# Patient Record
Sex: FEMALE | Race: WHITE | NOT HISPANIC OR LATINO | Employment: FULL TIME | URBAN - METROPOLITAN AREA
[De-identification: names, ages, dates, MRNs, and addresses within clinical notes are randomized per-mention and may not be internally consistent; named-entity substitution may affect disease eponyms.]

---

## 2017-01-10 ENCOUNTER — TELEPHONE (OUTPATIENT)
Dept: RHEUMATOLOGY | Facility: CLINIC | Age: 47
End: 2017-01-10

## 2017-01-10 NOTE — TELEPHONE ENCOUNTER
Spoke to pt, she is not needing refill. Pt advises pharmacy is what on forms to approve Nuvigil. Advised pt, nurse would look into forms and work on approval. Pt verbalized understanding.

## 2017-01-10 NOTE — TELEPHONE ENCOUNTER
----- Message from Roger Figueroa sent at 1/9/2017  9:41 AM CST -----  Contact: pt  Pt is requesting a callback(pt checking the status of her prescription refill)  Call Back#[phone]  Thanks

## 2017-03-02 RX ORDER — CYANOCOBALAMIN 1000 UG/ML
1000 INJECTION, SOLUTION INTRAMUSCULAR; SUBCUTANEOUS WEEKLY
Qty: 10 ML | Refills: 2 | Status: SHIPPED | OUTPATIENT
Start: 2017-03-02 | End: 2017-06-26 | Stop reason: SDUPTHER

## 2017-03-09 ENCOUNTER — TELEPHONE (OUTPATIENT)
Dept: RHEUMATOLOGY | Facility: CLINIC | Age: 47
End: 2017-03-09

## 2017-03-09 NOTE — TELEPHONE ENCOUNTER
----- Message from Blanka Pedro sent at 3/9/2017 10:31 AM CST -----  Call [phone] pt [facility] around 02/20 th / asking to verify her results were received and scanned into her chart ... Has an appointment with Dr MELANIE Sigala on 03/15/17

## 2017-03-15 ENCOUNTER — OFFICE VISIT (OUTPATIENT)
Dept: ENDOCRINOLOGY | Facility: CLINIC | Age: 47
End: 2017-03-15
Payer: COMMERCIAL

## 2017-03-15 VITALS
BODY MASS INDEX: 36.05 KG/M2 | SYSTOLIC BLOOD PRESSURE: 124 MMHG | WEIGHT: 211.19 LBS | HEIGHT: 64 IN | HEART RATE: 76 BPM | DIASTOLIC BLOOD PRESSURE: 80 MMHG

## 2017-03-15 DIAGNOSIS — R94.6 BORDERLINE ABNORMAL TFTS: Primary | ICD-10-CM

## 2017-03-15 DIAGNOSIS — R53.83 FATIGUE, UNSPECIFIED TYPE: ICD-10-CM

## 2017-03-15 DIAGNOSIS — E66.9 OBESITY (BMI 30-39.9): ICD-10-CM

## 2017-03-15 DIAGNOSIS — R63.8 UNABLE TO LOSE WEIGHT: ICD-10-CM

## 2017-03-15 PROCEDURE — 3074F SYST BP LT 130 MM HG: CPT | Mod: S$GLB,,, | Performed by: INTERNAL MEDICINE

## 2017-03-15 PROCEDURE — 99204 OFFICE O/P NEW MOD 45 MIN: CPT | Mod: S$GLB,,, | Performed by: INTERNAL MEDICINE

## 2017-03-15 PROCEDURE — 1160F RVW MEDS BY RX/DR IN RCRD: CPT | Mod: S$GLB,,, | Performed by: INTERNAL MEDICINE

## 2017-03-15 PROCEDURE — 3079F DIAST BP 80-89 MM HG: CPT | Mod: S$GLB,,, | Performed by: INTERNAL MEDICINE

## 2017-03-15 PROCEDURE — 99999 PR PBB SHADOW E&M-EST. PATIENT-LVL III: CPT | Mod: PBBFAC,,, | Performed by: INTERNAL MEDICINE

## 2017-03-15 NOTE — MR AVS SNAPSHOT
[facility]in[facility]  [address] [facility] [address]  [facility] [address]  Phone: [phone]  Fax: [phone]                  Sondra Beltran   3/15/2017 9:30 AM   Office Visit    Description:  Female : 1970   Provider:  Laura Sigala MD   Department:  [place] [facility]           Reason for Visit     abnormal thyroid levels           Diagnoses this Visit        Comments    Borderline abnormal TFTs    -  Primary     Obesity (BMI 30-39.9)         Fatigue, unspecified type         Unable to lose weight                To Do List           Future Appointments        Provider Department Dept Phone    3/27/2017 9:20 AM Tre Pereira MD [place] [facility] Family Medicine [phone]    2017 10:30 AM Vaibhav Huynh MD [place] - Rheumatology [phone]    2017 9:00 AM SLIC US1 [facility]- Ultrasound [phone]    2017 10:00 AM LAB, LISETTE SAT [facility] - Lab [phone]    2017 10:30 AM Laura Sigala MD [facility] End[facility]ology [phone]      Goals (5 Years of Data)     None      [facility]     [facility] Nurse Care [facility] -  Assistance  Registered nurses in the [facility] provide clinical advisement, health education, appointment booking, and other advisory services.  Call for this free service at [phone].             Medications           Message regarding Medications     Verify the changes and/or additions to your medication regime listed below are the same as discussed with your clinician today.  If any of these changes or additions are incorrect, please notify your healthcare provider.        STOP taking these medications     piroxicam (FELDENE) 20 MG capsule Take 1 capsule (20 mg total) by mouth once daily.           Verify that the below list of medications is an accurate representation of the medications you are currently taking.  If none reported, the list may be blank. If incorrect, please contact your healthcare  "provider. Carry this list with you in case of emergency.           Current Medications     ascorbic acid (VITAMIN C) 500 MG tablet Take 500 mg by mouth 3 (three) times daily.    azelastine-fluticasone (DYMISTA) 137-50 mcg/spray [place] by Nasal route.    budesonide-formoterol 160-4.5 mcg (SYMBICORT) 160-4.5 mcg/actuation HFAA Inhale 2 puffs into the lungs every 12 (twelve) hours.    cyanocobalamin (VITAMIN B-12) 1,000 mcg/mL injection Inject 1 mL (1,000 mcg total) into the skin once a week.    IMMUNE GLOB,WENCESLAO CAPRYLATE,IGG, (GAMUNEX IV) Inject into the vein every 30 days.    levocetirizine (XYZAL) 5 MG tablet Take 5 mg by mouth every evening.    lisinopril 10 MG tablet Take 1 tablet (10 mg total) by mouth once daily.    montelukast (SINGULAIR) 10 mg tablet Take 10 mg by mouth once daily.     MUCUS D  mg per tablet Take 1 tablet by mouth as needed.     PROAIR HFA 90 mcg/actuation inhaler Inhale 2 puffs into the lungs every 4 (four) hours as needed for Wheezing.    syringe, disposable, 1 mL Syrg 1 Syringe by Misc.(Non-Drug; Combo Route) route once a week.    vitamin D 1000 units Tab Take 185 mg by mouth once daily.           Clinical Reference Information           Your Vitals Were     BP Pulse Height Weight BMI    124/80 (BP Method: Manual) 76 5' 4" (1.626 m) 95.8 kg (211 lb 3.2 oz) 36.25 kg/m2      Blood Pressure          Most Recent Value    BP  124/80      Allergies as of 3/15/2017     Bactrim [Sulfamethoxazole-trimethoprim]      Immunizations Administered on Date of Encounter - 3/15/2017     None      Orders Placed During Today's Visit     Future Labs/Procedures Expected by Expires    TSH  3/15/2017 5/14/2018    US Soft Tissue Head Neck Thyroid  3/15/2017 3/15/2018      [facility] Sign-Up     Activating your [facility] account is as easy as 1-2-3!     1) Visit [email or web address], select Sign Up Now, enter this activation code and your date of birth, then select Next.  [email or web address]  Expires: 4/29/2017 10:14 AM  "     2) Create a username and password to use when you visit [facility] in the future and select a security question in case you lose your password and select Next.    3) Enter your e-mail address and click Sign Up!    Additional Information  If you have questions, please e-mail [email or web address] or call [phone] to talk to our [facility] staff. Remember, [facility] is NOT to be used for urgent needs. For medical emergencies, dial 911.         Language Assistance Services     ATTENTION: Language assistance services are available, free of charge. Please call [phone].      ATENCIÓN: Si habla español, tiene a evangelista disposición servicios gratuitos de asistencia lingüística. Llame al [phone].     CHÚ Ý: N?u b?n nói Ti?ng Vi?t, có các d?ch v? h? tr? ngôn ng? mi?n phí dành cho b?n. G?i s? [phone].         [facility] complies with applicable Federal civil rights laws and does not discriminate on the basis of race, color, national origin, age, disability, or sex.

## 2017-03-15 NOTE — PROGRESS NOTES
Subjective:      Patient ID: Sondra Betlran is a 46 y.o. female.    Chief Complaint:  abnormal thyroid levels      History of Present Illness    Referred to me for hypothyroidism   She is never been treated for hypothyroidism     Cousin : thyroid cancer     + fatigue   2 weeks ago was treated with prednisone     + unable to lose weight - weight fluctuates   Gym couple of times a week       Review of Systems   Constitutional: Positive for fatigue and unexpected weight change.   HENT: Negative for trouble swallowing and voice change.    Cardiovascular: Positive for leg swelling. Negative for palpitations.   Gastrointestinal: Positive for constipation (stool softners help). Negative for diarrhea.   Endocrine: Positive for heat intolerance (chronically ). Negative for cold intolerance.   Genitourinary: Negative for menstrual problem (s/p hysterectomy ).   Musculoskeletal: Negative for back pain and neck pain.   Neurological: Positive for headaches (' once in a blue moon ' ).   Psychiatric/Behavioral: Negative for sleep disturbance.       Objective:   Physical Exam   Constitutional: She appears well-developed.   HENT:   Right Ear: External ear normal.   Left Ear: External ear normal.   Nose: Nose normal.   Neck: No tracheal deviation present. Thyromegaly present.   Cardiovascular: Normal rate.    No murmur heard.  Pulmonary/Chest: Effort normal and breath sounds normal.   Abdominal: Soft. There is no tenderness. No hernia.   Musculoskeletal: She exhibits no edema.   Neurological: She displays normal reflexes. No cranial nerve deficit.   Skin: No rash noted.          Psychiatric: She has a normal mood and affect. Judgment normal.   Vitals reviewed.      Lab Review:           Assessment:     1. Obesity (BMI 30-39.9)     2. Borderline abnormal TFTs     3. Fatigue, unspecified type     4. Unable to lose weight          # ABNORMAL tft  Subclinical hyperthyroidism vs steroid interference   - IF TSH <0.1 in elderly patient or  if TSH between 0.1 to 0.5 with CAD/ heart disease or osteoporosis , recommendation is to treat.   - plan to repeat TSH in 3 months     # us thyroid prior to that visit      # unable to lose weight and fatigue   - unrelated to thyroid disease       Plan:   - plan to repeat TSH in 3 months     # us thyroid prior to that visit

## 2017-03-17 DIAGNOSIS — I10 UNCONTROLLED HYPERTENSION: ICD-10-CM

## 2017-03-17 RX ORDER — LISINOPRIL 10 MG/1
10 TABLET ORAL DAILY
Qty: 30 TABLET | Refills: 11 | Status: SHIPPED | OUTPATIENT
Start: 2017-03-17 | End: 2017-05-19 | Stop reason: SDUPTHER

## 2017-03-17 NOTE — TELEPHONE ENCOUNTER
Patient unable to make appointment on 3-27-17 stating time no longer works with her schedule for work. Patient offered appointment on the same date at 8am or 1pm. 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 Patient rescheduled appointment to 5-19-17. Request refill of Lisinopril.   LR--3-23-16  LOV--5-19-17  FOV--9-27-16

## 2017-03-17 NOTE — TELEPHONE ENCOUNTER
----- Message from Nicky Martinez sent at 3/17/2017  8:53 AM CDT -----  Contact: Patient  Sondra, patient [phone], Returning nurse's call about a reschedule on 3/27/17 appointment.  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 Anything between 9 AM to 11:30 AM.  Call to POD. Please advise. Thanks.

## 2017-05-19 ENCOUNTER — OFFICE VISIT (OUTPATIENT)
Dept: FAMILY MEDICINE | Facility: CLINIC | Age: 47
End: 2017-05-19
Payer: COMMERCIAL

## 2017-05-19 VITALS
WEIGHT: 208.75 LBS | BODY MASS INDEX: 35.64 KG/M2 | SYSTOLIC BLOOD PRESSURE: 110 MMHG | DIASTOLIC BLOOD PRESSURE: 72 MMHG | HEART RATE: 86 BPM | HEIGHT: 64 IN

## 2017-05-19 DIAGNOSIS — M25.50 ARTHRALGIA, UNSPECIFIED JOINT: ICD-10-CM

## 2017-05-19 DIAGNOSIS — Z12.31 ENCOUNTER FOR SCREENING MAMMOGRAM FOR MALIGNANT NEOPLASM OF BREAST: ICD-10-CM

## 2017-05-19 DIAGNOSIS — Z76.89 ESTABLISHING CARE WITH NEW DOCTOR, ENCOUNTER FOR: Primary | ICD-10-CM

## 2017-05-19 DIAGNOSIS — I10 BENIGN ESSENTIAL HTN: ICD-10-CM

## 2017-05-19 PROCEDURE — 1160F RVW MEDS BY RX/DR IN RCRD: CPT | Mod: S$GLB,,, | Performed by: FAMILY MEDICINE

## 2017-05-19 PROCEDURE — 3074F SYST BP LT 130 MM HG: CPT | Mod: S$GLB,,, | Performed by: FAMILY MEDICINE

## 2017-05-19 PROCEDURE — 3078F DIAST BP <80 MM HG: CPT | Mod: S$GLB,,, | Performed by: FAMILY MEDICINE

## 2017-05-19 PROCEDURE — 99214 OFFICE O/P EST MOD 30 MIN: CPT | Mod: S$GLB,,, | Performed by: FAMILY MEDICINE

## 2017-05-19 PROCEDURE — 99999 PR PBB SHADOW E&M-EST. PATIENT-LVL III: CPT | Mod: PBBFAC,,, | Performed by: FAMILY MEDICINE

## 2017-05-19 RX ORDER — FEXOFENADINE HYDROCHLORIDE AND PSEUDOEPHEDRINE HYDROCHLORIDE 60; 120 MG/1; MG/1
TABLET, FILM COATED, EXTENDED RELEASE ORAL
Refills: 1 | COMMUNITY
Start: 2017-04-25 | End: 2017-06-28

## 2017-05-19 RX ORDER — LISINOPRIL 10 MG/1
10 TABLET ORAL DAILY
Qty: 30 TABLET | Refills: 11 | Status: SHIPPED | OUTPATIENT
Start: 2017-05-19 | End: 2017-05-19

## 2017-05-19 RX ORDER — MOXIFLOXACIN HYDROCHLORIDE 400 MG/1
TABLET ORAL
Refills: 0 | COMMUNITY
Start: 2017-03-24 | End: 2017-05-29

## 2017-05-19 RX ORDER — PREDNISONE 20 MG/1
TABLET ORAL
Refills: 0 | COMMUNITY
Start: 2017-05-17 | End: 2017-05-29

## 2017-05-19 RX ORDER — LISINOPRIL 5 MG/1
5 TABLET ORAL DAILY
Qty: 90 TABLET | Refills: 3 | Status: SHIPPED | OUTPATIENT
Start: 2017-05-19 | End: 2017-06-19 | Stop reason: SDUPTHER

## 2017-05-19 NOTE — PATIENT INSTRUCTIONS
Weight Management: Getting Started  Healthy bodies come in all shapes and sizes. Not all bodies are made to be thin. For some people, a healthy weight is higher than the average weight listed on weight charts. Your healthcare provider can help you decide on a healthy weight for you.    Reasons to lose weight  Losing weight can help with some health problems, such as high blood pressure, heart disease, diabetes, sleep apnea, and arthritis. You may also feel more energy.  Set your long-term goal  Your goal doesn't even have to be a specific weight. You may decide on a fitness goal (such as being able to walk 10 miles a week), or a health goal (such as lowering your blood pressure). Choose a goal that is measurable and reasonable, so you know when you've reached it. A goal of reaching a BMI of less than 25 is not always reasonable (or possible).   Make an action plan  Habits dont change overnight. Setting your goals too high can leave you feeling discouraged if you cant reach them. Be realistic. Choose one or two small changes you can make now. Set an action plan for how you are going to make these changes. When you can stick to this plan, keep making a few more small changes. Taking small steps will help you stay on the path to success.  Track your progress  Write down your goals. Then, keep a daily record of your progress. Write down what you eat and how active you are. This record lets you look back on how much youve done. It may also help when youre feeling frustrated. Reward yourself for success. Even if you dont reach every goal, give yourself credit for what you do get done.  Get support  Encouragement from others can help make losing weight easier. Ask your family members and friends for support. They may even want to join you. Also look to your healthcare provider, registered dietitian, and  for help. Your local hospital can give you more information about nutrition, exercise, and  weight loss.  Date Last Reviewed: 1/31/2016  © 4834-0048 [employer], "[employer]. [address] [place] [address], [place] [address]. All rights reserved. This information is not intended as a substitute for professional medical care. Always follow your healthcare professional's instructions.

## 2017-05-19 NOTE — PROGRESS NOTES
Subjective:       Patient ID: Sondra Beltran is a 47 y.o. female.    Chief Complaint: Establish Care    HPI     Establish Care  Pt reports to the clinic to establish care.   The pt has a medical history which includes HTN and arthralgias.   As far as smoking is concerned, the pt denies.   The pt attempts to maintain a healthy diet and engages in regular exercise.   Consistent seatbelt usage reported.   Pt has no symptoms of depression.   Health maintenance addressed and updated in chart.    Review of Systems   Constitutional: Negative for chills and fever.   HENT: Positive for sinus pressure. Negative for sore throat.    Eyes: Negative for visual disturbance.   Respiratory: Negative for cough and shortness of breath.    Cardiovascular: Negative for chest pain and leg swelling.   Gastrointestinal: Negative for abdominal pain, blood in stool, constipation, diarrhea and vomiting.   Genitourinary: Negative for difficulty urinating, dysuria and hematuria.   Musculoskeletal: Negative for arthralgias.   Neurological: Negative for dizziness and weakness.       Objective:      Physical Exam   Constitutional: She appears well-developed and well-nourished. No distress.   Obese female in NAD.   HENT:   Head: Normocephalic and atraumatic.   Mouth/Throat: Oropharynx is clear and moist. No oropharyngeal exudate.   Eyes: EOM are normal. Pupils are equal, round, and reactive to light.   Neck: Normal range of motion. Neck supple. No thyromegaly present.   Cardiovascular: Normal rate, regular rhythm, normal heart sounds and intact distal pulses.    Pulmonary/Chest: Effort normal and breath sounds normal. No respiratory distress. She has no wheezes.   Abdominal: Soft. Bowel sounds are normal. She exhibits no distension and no mass. There is no tenderness.   Musculoskeletal: She exhibits no edema.   Lymphadenopathy:     She has no cervical adenopathy.   Neurological: She is alert.   Skin: Skin is warm. No rash noted. No erythema.    Psychiatric: She has a normal mood and affect. Her behavior is normal.   Vitals reviewed.      Assessment:       1. Establishing care with new doctor, encounter for    2. Benign essential HTN    3. Arthralgia, unspecified joint    4. Encounter for screening mammogram for malignant neoplasm of breast        Plan:       1. Establishing care with new doctor, encounter for    2. Benign essential HTN  Decrease lisinopril to 5 mg for borderline low BP.   - Lipid panel; Future  F/u in 1 month    3. Arthralgia, unspecified joint  Condition currently stable. No changes to medication regimen on today.     4. Encounter for screening mammogram for malignant neoplasm of breast  - Mammo Digital Screening Bilat with CAD; Future    Patient readiness: acceptance and barriers:none    During the course of the visit the patient was educated and counseled about the following:     Obesity:   Informal exercise measures discussed, e.g. taking stairs instead of elevator.  Regular aerobic exercise program discussed.    Goals: Obesity: Reduce calorie intake and BMI    Did patient meet goals/outcomes: No    The following self management tools provided: excercise log    Patient Instructions (the written plan) was given to the patient/family.     Time spent with patient: 15 minutes    Portions of this note were created using Dragon voice recognition software. There may be voice recognition errors found in the text, and attempts were made to correct these errors prior to signature    Tre Pereira MD    Family Medicine  5/19/2017

## 2017-05-19 NOTE — MR AVS SNAPSHOT
[facility] Medicine  [address]  [place] [address]  Phone: [phone]  Fax: [phone]                  Sondra Beltran   2017 9:20 AM   Office Visit    Description:  Female : 1970   Provider:  Tre Pereira MD   Department:  [facility] Medicine           Reason for Visit     Establish Care           Diagnoses this Visit        Comments    Establishing care with new doctor, encounter for    -  Primary     Benign essential HTN         Arthralgia, unspecified joint         Encounter for screening mammogram for malignant neoplasm of breast                To Do List           Future Appointments        Provider Department Dept Phone    2017 8:00 AM LAB [facility] - Lab [phone]    2017 10:30 AM Vaibhav Huynh MD [place] - Rheumatology [phone]    2017 9:00 AM SLIC US1 [facility]- Ultrasound [phone]    2017 10:00 AM LAB [facility] - Lab [phone]    2017 10:00 AM Tre Pereira MD [facility] [phone]      Goals (5 Years of Data)     None      Follow-Up and Disposition     Return in about 1 month (around 2017) for htn.    Follow-up and Disposition History       These Medications        Disp Refills Start End    lisinopril (PRINIVIL,ZESTRIL) 5 MG tablet 90 tablet 3 2017    Take 1 tablet (5 mg total) by mouth once daily. - Oral    Pharmacy: [facility] - [place], [place] - [address] Ph #: [phone]         [facility] On Call     [facility] On Call Nurse Care Line -  Assistance  Unless otherwise directed by your provider, please contact [facility], our nurse care line that is available for  assistance.     Registered nurses in the [facility] provide: appointment scheduling, clinical advisement, health education, and other advisory services.  Call: [phone] (toll free)               Medications            Message regarding Medications     Verify the changes and/or additions to your medication regime listed below are the same as discussed with your clinician today.  If any of these changes or additions are incorrect, please notify your healthcare provider.        START taking these NEW medications        Refills    lisinopril (PRINIVIL,ZESTRIL) 5 MG tablet 3    Sig: Take 1 tablet (5 mg total) by mouth once daily.    Class: Normal    Route: Oral           Verify that the below list of medications is an accurate representation of the medications you are currently taking.  If none reported, the list may be blank. If incorrect, please contact your healthcare provider. Carry this list with you in case of emergency.           Current Medications     ALLEGRA-D 12 HOUR  mg per tablet     ascorbic acid (VITAMIN C) 500 MG tablet Take 500 mg by mouth 3 (three) times daily.    azelastine-fluticasone (DYMISTA) 137-50 mcg/spray [place] by Nasal route.    budesonide-formoterol 160-4.5 mcg (SYMBICORT) 160-4.5 mcg/actuation HFAA Inhale 2 puffs into the lungs every 12 (twelve) hours.    cyanocobalamin (VITAMIN B-12) 1,000 mcg/mL injection Inject 1 mL (1,000 mcg total) into the skin once a week.    IMMUNE GLOB,WENCESLAO CAPRYLATE,IGG, (GAMUNEX IV) Inject into the vein every 30 days.    levocetirizine (XYZAL) 5 MG tablet Take 5 mg by mouth every evening.    lisinopril (PRINIVIL,ZESTRIL) 5 MG tablet Take 1 tablet (5 mg total) by mouth once daily.    montelukast (SINGULAIR) 10 mg tablet Take 10 mg by mouth once daily.     moxifloxacin (AVELOX) 400 mg tablet     MUCUS D  mg per tablet Take 1 tablet by mouth as needed.     predniSONE (DELTASONE) 20 MG tablet     PROAIR HFA 90 mcg/actuation inhaler Inhale 2 puffs into the lungs every 4 (four) hours as needed for Wheezing.    syringe, disposable, 1 mL Syrg 1 Syringe by Misc.(Non-Drug; Combo Route) route once a week.    vitamin D 1000 units Tab Take 185 mg by mouth once daily.          "  Clinical Reference Information           Your Vitals Were     BP Pulse Height Weight BMI    110/72 86 5' 4" (1.626 m) 94.7 kg (208 lb 12.4 oz) 35.84 kg/m2      Blood Pressure          Most Recent Value    BP  110/72      Allergies as of 5/19/2017     Bactrim [Sulfamethoxazole-trimethoprim]      Immunizations Administered on Date of Encounter - 5/19/2017     None      Orders Placed During Today's Visit     Future Labs/Procedures Expected by Expires    Lipid panel  5/19/2017 7/18/2018    Mammo Digital Screening Bilat with CAD  5/19/2017 7/19/2018      [facility] Sign-Up     Activating your [facility] account is as easy as 1-2-3!     1) Visit [email or web address], select Sign Up Now, enter this activation code and your date of birth, then select Next.  [email or web address]  Expires: 5/27/2017  4:36 PM      2) Create a username and password to use when you visit [facility] in the future and select a security question in case you lose your password and select Next.    3) Enter your e-mail address and click Sign Up!    Additional Information  If you have questions, please e-mail [email or web address] or call [phone] to talk to our [facility] staff. Remember, [facility] is NOT to be used for urgent needs. For medical emergencies, dial 911.         Instructions      Weight Management: Getting Started  Healthy bodies come in all shapes and sizes. Not all bodies are made to be thin. For some people, a healthy weight is higher than the average weight listed on weight charts. Your healthcare provider can help you decide on a healthy weight for you.    Reasons to lose weight  Losing weight can help with some health problems, such as high blood pressure, heart disease, diabetes, sleep apnea, and arthritis. You may also feel more energy.  Set your long-term goal  Your goal doesn't even have to be a specific weight. You may decide on a fitness goal (such as being able to walk 10 miles a week), or a health goal (such as lowering your blood " pressure). Choose a goal that is measurable and reasonable, so you know when you've reached it. A goal of reaching a BMI of less than 25 is not always reasonable (or possible).   Make an action plan  Habits dont change overnight. Setting your goals too high can leave you feeling discouraged if you cant reach them. Be realistic. Choose one or two small changes you can make now. Set an action plan for how you are going to make these changes. When you can stick to this plan, keep making a few more small changes. Taking small steps will help you stay on the path to success.  Track your progress  Write down your goals. Then, keep a daily record of your progress. Write down what you eat and how active you are. This record lets you look back on how much youve done. It may also help when youre feeling frustrated. Reward yourself for success. Even if you dont reach every goal, give yourself credit for what you do get done.  Get support  Encouragement from others can help make losing weight easier. Ask your family members and friends for support. They may even want to join you. Also look to your healthcare provider, registered dietitian, and  for help. Your local hospital can give you more information about nutrition, exercise, and weight loss.  Date Last Reviewed: 1/31/2016  © 9287-5215 [employer], [place]. [address] [place] [address], [place] [address]. All rights reserved. This information is not intended as a substitute for professional medical care. Always follow your healthcare professional's instructions.             Language Assistance Services     ATTENTION: Language assistance services are available, free of charge. Please call [phone].      ATENCIÓN: Si habla español, tiene a evangelista disposición servicios gratuitos de asistencia lingüística. Llame al [phone].     [facility] Ý: N?u b?n nói Ti?ng Vi?t, có các d?ch v? h? tr? ngôn ng? mi?n phí dành cho b?n. G?i s? [phone].          [facility] complies with applicable Federal civil rights laws and does not discriminate on the basis of race, color, national origin, age, disability, or sex.

## 2017-05-20 ENCOUNTER — LAB VISIT (OUTPATIENT)
Dept: LAB | Facility: HOSPITAL | Age: 47
End: 2017-05-20
Attending: FAMILY MEDICINE
Payer: COMMERCIAL

## 2017-05-20 DIAGNOSIS — I10 BENIGN ESSENTIAL HTN: ICD-10-CM

## 2017-05-20 LAB
CHOLEST/HDLC SERPL: 4.3 {RATIO}
HDL/CHOLESTEROL RATIO: 23.3 %
HDLC SERPL-MCNC: 227 MG/DL
HDLC SERPL-MCNC: 53 MG/DL
LDLC SERPL CALC-MCNC: 154 MG/DL
NONHDLC SERPL-MCNC: 174 MG/DL
TRIGL SERPL-MCNC: 100 MG/DL

## 2017-05-20 PROCEDURE — 36415 COLL VENOUS BLD VENIPUNCTURE: CPT | Mod: PO

## 2017-05-20 PROCEDURE — 80061 LIPID PANEL: CPT

## 2017-05-25 ENCOUNTER — TELEPHONE (OUTPATIENT)
Dept: FAMILY MEDICINE | Facility: CLINIC | Age: 47
End: 2017-05-25

## 2017-05-25 NOTE — TELEPHONE ENCOUNTER
The 10-year ASCVD risk score (Lisandra PONCE Jr., et al., 2013) is: 1.1%    Values used to calculate the score:      Age: 47 years      Sex: Female      Is Non- : No      Diabetic: No      Tobacco smoker: No      Systolic Blood Pressure: 110 mmHg      Is BP treated: Yes      HDL Cholesterol: 53 mg/dL      Total Cholesterol: 227 mg/dL

## 2017-05-29 ENCOUNTER — OFFICE VISIT (OUTPATIENT)
Dept: RHEUMATOLOGY | Facility: CLINIC | Age: 47
End: 2017-05-29
Payer: COMMERCIAL

## 2017-05-29 VITALS
HEIGHT: 64 IN | WEIGHT: 212.31 LBS | HEART RATE: 83 BPM | DIASTOLIC BLOOD PRESSURE: 84 MMHG | BODY MASS INDEX: 36.25 KG/M2 | SYSTOLIC BLOOD PRESSURE: 127 MMHG

## 2017-05-29 DIAGNOSIS — D89.89 CHRONIC FATIGUE AND IMMUNE DYSFUNCTION SYNDROME: ICD-10-CM

## 2017-05-29 DIAGNOSIS — E06.3 AUTOIMMUNE THYROIDITIS: ICD-10-CM

## 2017-05-29 DIAGNOSIS — A49.9 RECURRENT BACTERIAL INFECTION: ICD-10-CM

## 2017-05-29 DIAGNOSIS — R76.8 POSITIVE ANA (ANTINUCLEAR ANTIBODY): ICD-10-CM

## 2017-05-29 DIAGNOSIS — G93.32 CHRONIC FATIGUE AND IMMUNE DYSFUNCTION SYNDROME: ICD-10-CM

## 2017-05-29 DIAGNOSIS — D84.9 IMMUNE DEFICIENCY DISORDER: Primary | ICD-10-CM

## 2017-05-29 PROCEDURE — 99999 PR PBB SHADOW E&M-EST. PATIENT-LVL III: CPT | Mod: PBBFAC,,, | Performed by: INTERNAL MEDICINE

## 2017-05-29 PROCEDURE — 96372 THER/PROPH/DIAG INJ SC/IM: CPT | Mod: S$GLB,,, | Performed by: INTERNAL MEDICINE

## 2017-05-29 PROCEDURE — 99214 OFFICE O/P EST MOD 30 MIN: CPT | Mod: 25,S$GLB,, | Performed by: INTERNAL MEDICINE

## 2017-05-29 RX ORDER — CLARITHROMYCIN 500 MG/1
TABLET, FILM COATED ORAL
Refills: 0 | COMMUNITY
Start: 2017-05-23 | End: 2017-06-28

## 2017-05-29 RX ORDER — CEFTRIAXONE 1 G/1
1 INJECTION, POWDER, FOR SOLUTION INTRAMUSCULAR; INTRAVENOUS
Status: COMPLETED | OUTPATIENT
Start: 2017-05-29 | End: 2017-05-29

## 2017-05-29 RX ADMIN — CEFTRIAXONE 1 G: 1 INJECTION, POWDER, FOR SOLUTION INTRAMUSCULAR; INTRAVENOUS at 01:05

## 2017-05-29 NOTE — PROGRESS NOTES
Administered 1g Ceftriaxone to left upper out quadrant of gluteus willy. Patient tolerated well. No acute reaction noted.

## 2017-05-29 NOTE — PROGRESS NOTES
Subjective:       Patient ID: Sondra Beltran is a 47 y.o. female.    Chief Complaint: Disease Management    Follow up Gammunex 40 Gm every 4 weeks  Has fatigue and has had multiple infections Feb 2017 Patient complains of arthralgias and myalgias for which has been present for a few years. Pain is located in multiple joints, both shoulder(s), both elbow(s), both wrist(s), both MCP(s): 1st, 2nd, 3rd, 4th and 5th, both PIP(s): 1st, 2nd, 3rd, 4th and 5th, both DIP(s): 1st and 2nd, both hip(s), both knee(s) and both MTP(s): 1st, 2nd, 3rd, 4th and 5th, is described as aching, pulsating, shooting and throbbing, and is constant, moderate .       Review of Systems   Constitutional: Positive for activity change. Negative for appetite change, chills, diaphoresis and unexpected weight change.   HENT: Negative for congestion, dental problem, ear discharge, ear pain, facial swelling, mouth sores, nosebleeds, postnasal drip, rhinorrhea, sinus pressure, sneezing, sore throat, tinnitus and voice change.    Eyes: Negative for photophobia, pain, discharge, redness and itching.   Respiratory: Negative for apnea, cough, chest tightness, shortness of breath and wheezing.    Cardiovascular: Positive for leg swelling. Negative for chest pain and palpitations.   Gastrointestinal: Negative for abdominal distention, abdominal pain, constipation, diarrhea, nausea and vomiting.   Endocrine: Negative for cold intolerance, heat intolerance, polydipsia and polyuria.   Genitourinary: Negative for decreased urine volume, difficulty urinating, flank pain, frequency, hematuria and urgency.   Musculoskeletal: Positive for arthralgias, back pain, gait problem, neck pain and neck stiffness.   Skin: Negative for pallor, rash and wound.   Allergic/Immunologic: Negative for immunocompromised state.   Neurological: Negative for dizziness, tremors, weakness and numbness.   Hematological: Negative for adenopathy. Does not bruise/bleed easily.  "  Psychiatric/Behavioral: Negative for sleep disturbance. The patient is not nervous/anxious.          Objective:     /84   Pulse 83   Ht 5' 4" (1.626 m)   Wt 96.3 kg (212 lb 4.9 oz)   BMI 36.44 kg/m²      Physical Exam   Vitals reviewed.  Constitutional: She is oriented to person, place, and time and well-developed, well-nourished, and in no distress.   HENT:   Head: Normocephalic and atraumatic.   Mouth/Throat: Oropharynx is clear and moist.   Eyes: EOM are normal. Pupils are equal, round, and reactive to light.   Neck: Neck supple. No thyromegaly present.   Cardiovascular: Normal rate, regular rhythm and normal heart sounds.  Exam reveals no gallop and no friction rub.    No murmur heard.  Pulmonary/Chest: Breath sounds normal. She has no wheezes. She has no rales. She exhibits no tenderness.   Abdominal: There is no tenderness. There is no rebound and no guarding.       Right Side Rheumatological Exam     Examination finds the shoulder, elbow, wrist, knee, 1st PIP, 1st MCP, 2nd PIP, 2nd MCP, 3rd PIP, 3rd MCP, 4th PIP, 4th MCP, 5th PIP and 5th MCP normal.    Shoulder Exam   Tenderness Location: no tenderness    Range of Motion   Active Abduction: abnormal   Adduction: abnormal  Sensation: normal    Knee Exam   Patellofemoral Crepitus: negative  Effusion: negative  Sensation: normal    Hip Exam   Tenderness Location: no tenderness  Sensation: normal    Elbow/Wrist Exam   Tenderness Location: no tenderness  Sensation: normal    Left Side Rheumatological Exam     Examination finds the shoulder, elbow, wrist, knee, 1st PIP, 1st MCP, 2nd PIP, 2nd MCP, 3rd PIP, 3rd MCP, 4th PIP, 4th MCP, 5th PIP and 5th MCP normal.    Shoulder Exam   Tenderness Location: no tenderness    Range of Motion   Active Abduction: abnormal   Sensation: normal    Knee Exam     Patellofemoral Crepitus: negative  Effusion: negative  Sensation: normal    Hip Exam   Tenderness Location: no tenderness  Sensation: normal    Elbow/Wrist Exam "   Sensation: normal      Back/Neck Exam   General Inspection   Gait: normal         Lymphadenopathy:     She has no cervical adenopathy.   Neurological: She is alert and oriented to person, place, and time. Gait normal.   Skin: No rash noted. No erythema. No pallor.     Psychiatric: Mood and affect normal.   Musculoskeletal: She exhibits edema, tenderness and deformity.          Assessment:         Encounter Diagnoses   Name Primary?    Immune deficiency disorder Yes    Recurrent bacterial infection     Positive LAURENCE (antinuclear antibody)     Autoimmune thyroiditis     Chronic fatigue and immune dysfunction syndrome          Plan:         Immune deficiency disorder  -     cefTRIAXone injection 1 g; Inject 1 g into the muscle one time.  -     T3, free; Future; Expected date: 05/29/2017  -     T4, free; Future; Expected date: 05/29/2017  -     Sedimentation rate, manual; Future; Expected date: 05/29/2017  -     C-reactive protein; Future; Expected date: 05/29/2017  -     Thyroid peroxidase antibody; Future; Expected date: 05/29/2017  -     Anti-thyroglobulin antibody; Future; Expected date: 05/29/2017    Recurrent bacterial infection  -     cefTRIAXone injection 1 g; Inject 1 g into the muscle one time.  -     T3, free; Future; Expected date: 05/29/2017  -     T4, free; Future; Expected date: 05/29/2017  -     Sedimentation rate, manual; Future; Expected date: 05/29/2017  -     C-reactive protein; Future; Expected date: 05/29/2017  -     Thyroid peroxidase antibody; Future; Expected date: 05/29/2017  -     Anti-thyroglobulin antibody; Future; Expected date: 05/29/2017    Positive LAURENCE (antinuclear antibody)  -     cefTRIAXone injection 1 g; Inject 1 g into the muscle one time.  -     T3, free; Future; Expected date: 05/29/2017  -     T4, free; Future; Expected date: 05/29/2017  -     Sedimentation rate, manual; Future; Expected date: 05/29/2017  -     C-reactive protein; Future; Expected date: 05/29/2017  -      Thyroid peroxidase antibody; Future; Expected date: 05/29/2017  -     Anti-thyroglobulin antibody; Future; Expected date: 05/29/2017    Autoimmune thyroiditis  -     cefTRIAXone injection 1 g; Inject 1 g into the muscle one time.  -     T3, free; Future; Expected date: 05/29/2017  -     T4, free; Future; Expected date: 05/29/2017  -     Sedimentation rate, manual; Future; Expected date: 05/29/2017  -     C-reactive protein; Future; Expected date: 05/29/2017  -     Thyroid peroxidase antibody; Future; Expected date: 05/29/2017  -     Anti-thyroglobulin antibody; Future; Expected date: 05/29/2017    Chronic fatigue and immune dysfunction syndrome  -     cefTRIAXone injection 1 g; Inject 1 g into the muscle one time.  -     T3, free; Future; Expected date: 05/29/2017  -     T4, free; Future; Expected date: 05/29/2017  -     Sedimentation rate, manual; Future; Expected date: 05/29/2017  -     C-reactive protein; Future; Expected date: 05/29/2017  -     Thyroid peroxidase antibody; Future; Expected date: 05/29/2017  -     Anti-thyroglobulin antibody; Future; Expected date: 05/29/2017    pt is on biaxin and still ill

## 2017-06-19 ENCOUNTER — HOSPITAL ENCOUNTER (OUTPATIENT)
Dept: RADIOLOGY | Facility: CLINIC | Age: 47
Discharge: HOME | End: 2017-06-19
Attending: INTERNAL MEDICINE
Payer: COMMERCIAL

## 2017-06-19 ENCOUNTER — OFFICE VISIT (OUTPATIENT)
Dept: FAMILY MEDICINE | Facility: CLINIC | Age: 47
End: 2017-06-19
Payer: COMMERCIAL

## 2017-06-19 VITALS
DIASTOLIC BLOOD PRESSURE: 72 MMHG | WEIGHT: 209 LBS | HEART RATE: 69 BPM | SYSTOLIC BLOOD PRESSURE: 118 MMHG | BODY MASS INDEX: 35.68 KG/M2 | HEIGHT: 64 IN

## 2017-06-19 DIAGNOSIS — E66.9 OBESITY (BMI 30-39.9): ICD-10-CM

## 2017-06-19 DIAGNOSIS — R63.8 UNABLE TO LOSE WEIGHT: ICD-10-CM

## 2017-06-19 DIAGNOSIS — E66.9 OBESITY (BMI 30-39.9): Primary | ICD-10-CM

## 2017-06-19 DIAGNOSIS — R94.6 BORDERLINE ABNORMAL TFTS: ICD-10-CM

## 2017-06-19 DIAGNOSIS — R53.83 FATIGUE, UNSPECIFIED TYPE: ICD-10-CM

## 2017-06-19 DIAGNOSIS — I10 BENIGN ESSENTIAL HTN: ICD-10-CM

## 2017-06-19 PROCEDURE — 99999 PR PBB SHADOW E&M-EST. PATIENT-LVL II: CPT | Mod: PBBFAC,,, | Performed by: FAMILY MEDICINE

## 2017-06-19 PROCEDURE — 76536 US EXAM OF HEAD AND NECK: CPT | Mod: TC,PO

## 2017-06-19 PROCEDURE — 99214 OFFICE O/P EST MOD 30 MIN: CPT | Mod: S$GLB,,, | Performed by: FAMILY MEDICINE

## 2017-06-19 PROCEDURE — 76536 US EXAM OF HEAD AND NECK: CPT | Mod: 26,,, | Performed by: RADIOLOGY

## 2017-06-19 RX ORDER — CEFDINIR 300 MG/1
CAPSULE ORAL
Refills: 0 | COMMUNITY
Start: 2017-06-16 | End: 2017-06-28

## 2017-06-19 RX ORDER — LISINOPRIL 5 MG/1
5 TABLET ORAL DAILY
Qty: 90 TABLET | Refills: 3 | Status: SHIPPED | OUTPATIENT
Start: 2017-06-19 | End: 2018-06-26 | Stop reason: SDUPTHER

## 2017-06-19 NOTE — PROGRESS NOTES
Subjective:       Patient ID: Sondra Beltran is a 47 y.o. female.    Chief Complaint: Follow-up (1 month)    HPI     Hypertension  Patient reports compliance with medications.  Currently, the patient is taking lisinopril.  The patient reports home BP measurements of 110's-120's/70's-80's.  Pt engages in regular exercise and has attempted to maintain a low sodium diet   Patient further reports no vision changes/frequent HA's/edema/urinary changes.  Currently, the pt does not smoke.     Review of Systems   Constitutional: Negative for chills and fever.   HENT: Negative for sore throat.    Eyes: Negative for visual disturbance.   Respiratory: Negative for cough and shortness of breath.    Cardiovascular: Negative for chest pain and leg swelling.   Gastrointestinal: Negative for abdominal pain, blood in stool, constipation, diarrhea and vomiting.   Genitourinary: Negative for difficulty urinating, dysuria and hematuria.   Musculoskeletal: Negative for arthralgias.   Neurological: Negative for dizziness and weakness.       Objective:      Physical Exam   Constitutional: She appears well-developed and well-nourished. No distress.   Obese female in NAD.   HENT:   Head: Normocephalic and atraumatic.   Mouth/Throat: Oropharynx is clear and moist. No oropharyngeal exudate.   Eyes: EOM are normal. Pupils are equal, round, and reactive to light.   Neck: Normal range of motion. Neck supple. No thyromegaly present.   Cardiovascular: Normal rate, regular rhythm, normal heart sounds and intact distal pulses.    Pulmonary/Chest: Effort normal and breath sounds normal. No respiratory distress. She has no wheezes.   Abdominal: Soft. Bowel sounds are normal. She exhibits no distension and no mass. There is no tenderness.   Musculoskeletal: She exhibits no edema.   Lymphadenopathy:     She has no cervical adenopathy.   Neurological: She is alert.   Skin: Skin is warm. No rash noted. No erythema.   Psychiatric: She has a normal  mood and affect. Her behavior is normal.   Vitals reviewed.      Assessment:       1. Obesity (BMI 30-39.9)    2. Benign essential HTN        Plan:       1. Benign essential HTN  Condition currently stable. No changes to medication regimen on today.   - lisinopril (PRINIVIL,ZESTRIL) 5 MG tablet; Take 1 tablet (5 mg total) by mouth once daily.  Dispense: 90 tablet; Refill: 3    2. Obesity (BMI 30-39.9)  Patient has been advised to continue to maintain a healthy lifestyle, including regular exercise and consuming a well balanced diet.     Patient readiness: acceptance and barriers:none    During the course of the visit the patient was educated and counseled about the following:     Hypertension:   Dietary sodium restriction.  Regular aerobic exercise.  Obesity:   Informal exercise measures discussed, e.g. taking stairs instead of elevator.  Regular aerobic exercise program discussed.    Goals: Hypertension: Reduce Blood Pressure and Obesity: Reduce calorie intake and BMI    Did patient meet goals/outcomes: No  The following self management tools provided: blood pressure log  excercise log    Patient Instructions (the written plan) was given to the patient/family.     Time spent with patient: 15 minutes      Portions of this note were created using Dragon voice recognition software. There may be voice recognition errors found in the text, and attempts were made to correct these errors prior to signature    Tre Pereira MD    Family Medicine  6/19/2017

## 2017-06-27 RX ORDER — CYANOCOBALAMIN 1000 UG/ML
INJECTION, SOLUTION INTRAMUSCULAR; SUBCUTANEOUS
Qty: 10 ML | Refills: 3 | Status: SHIPPED | OUTPATIENT
Start: 2017-06-27 | End: 2017-09-12 | Stop reason: SDUPTHER

## 2017-06-28 ENCOUNTER — OFFICE VISIT (OUTPATIENT)
Dept: ENDOCRINOLOGY | Facility: CLINIC | Age: 47
End: 2017-06-28
Payer: COMMERCIAL

## 2017-06-28 VITALS
WEIGHT: 210 LBS | HEIGHT: 64 IN | SYSTOLIC BLOOD PRESSURE: 130 MMHG | HEART RATE: 76 BPM | DIASTOLIC BLOOD PRESSURE: 88 MMHG | BODY MASS INDEX: 35.85 KG/M2

## 2017-06-28 DIAGNOSIS — R94.6 BORDERLINE ABNORMAL TFTS: Primary | ICD-10-CM

## 2017-06-28 DIAGNOSIS — I10 BENIGN ESSENTIAL HTN: ICD-10-CM

## 2017-06-28 DIAGNOSIS — E04.1 THYROID NODULE: ICD-10-CM

## 2017-06-28 PROCEDURE — 99999 PR PBB SHADOW E&M-EST. PATIENT-LVL III: CPT | Mod: PBBFAC,,, | Performed by: INTERNAL MEDICINE

## 2017-06-28 PROCEDURE — 99214 OFFICE O/P EST MOD 30 MIN: CPT | Mod: S$GLB,,, | Performed by: INTERNAL MEDICINE

## 2017-06-28 RX ORDER — CETIRIZINE HYDROCHLORIDE, PSEUDOEPHEDRINE HYDROCHLORIDE 5; 120 MG/1; MG/1
1 TABLET, FILM COATED, EXTENDED RELEASE ORAL
COMMUNITY
End: 2019-09-17

## 2017-06-28 NOTE — PROGRESS NOTES
Subjective:      Patient ID: Sondra Beltran is a 47 y.o. female.    Chief Complaint:  No chief complaint on file.      History of Present Illness    Referred to me for hypothyroidism   She is never been treated for hypothyroidism     Cousin : thyroid cancer     + fatigue   2 weeks ago was treated with prednisone     + unable to lose weight - weight fluctuates   Gym couple of times a week       Review of Systems   Constitutional: Positive for fatigue.   HENT: Negative for trouble swallowing and voice change.    Cardiovascular: Positive for leg swelling. Negative for palpitations.   Gastrointestinal: Positive for constipation (stool softners help). Negative for diarrhea.   Endocrine: Positive for heat intolerance (chronically ). Negative for cold intolerance.   Genitourinary: Negative for menstrual problem (s/p hysterectomy ).   Musculoskeletal: Negative for back pain and neck pain.   Neurological: Positive for headaches (' once in a blue moon ' ).   Psychiatric/Behavioral: Negative for sleep disturbance.       Objective:   Physical Exam   Constitutional: No distress.   Skin: She is not diaphoretic.   Vitals reviewed.      Lab Review:         Comparison: None    Technique: Transverse and longitudinal gray scale ultrasound images of the thyroid gland with limited color Doppler analysis.    Findings:The right thyroid lobe measures approximately 5.1 x 1.4 x 1.4 cm. Normal echogenicity is noted. There is a 4 mm hypoechoic nodule within the upper pole of the right thyroid lobe. Normal right thyroid lobe vascularity is seen.    The thyroid isthmus measures 0.8 cm in AP dimension. The left thyroid lobe measures approximately 4.7 x 1.5 x 1.3 cm. Normal echogenicity is noted. No increased vascularity. No left thyroid lobe nodule is seen.    Total thyroid volume is estimated at 10 mL.   Impression       1.  4 mm nodule within the right thyroid lobe which does not meet criteria for recommended FNA.  2. Otherwise normal  examination. Normal sized thyroid gland.      Electronically signed by: Amarilis Tan MD  Date: 06/19/17  Time: 09:52     Encounter     View Encounter          Reviewed By     Laura Sigala MD on 6/20/2017 16:07   Results for MICHELE DANIELLA TIAN (MRN [id number]) as of 6/28/2017 10:18   Ref. Range 6/19/2017 08:49   TSH Latest Ref Range: 0.400 - 4.000 uIU/mL 1.049     Results for orders placed or performed in visit on 06/19/17   TSH   Result Value Ref Range    TSH 1.049 0.400 - 4.000 uIU/mL         Assessment:     1. Borderline abnormal TFTs     2. Thyroid nodule     3. Benign essential HTN          # ABNORMAL tft  Resolved TSH normal   Periodic TSH with PCP     # sub centimeter nodule right side     US thyroid in 3-4 years     # htn at goal   Plan:     Follow up:  PRN

## 2017-09-12 RX ORDER — CYANOCOBALAMIN 1000 UG/ML
1000 INJECTION, SOLUTION INTRAMUSCULAR; SUBCUTANEOUS
Qty: 12 ML | Refills: 3 | Status: SHIPPED | OUTPATIENT
Start: 2017-09-12 | End: 2017-09-19 | Stop reason: SDUPTHER

## 2017-09-21 RX ORDER — CYANOCOBALAMIN 1000 UG/ML
1000 INJECTION, SOLUTION INTRAMUSCULAR; SUBCUTANEOUS
Qty: 12 ML | Refills: 0 | Status: SHIPPED | OUTPATIENT
Start: 2017-09-21 | End: 2017-12-20

## 2018-06-26 DIAGNOSIS — I10 BENIGN ESSENTIAL HTN: ICD-10-CM

## 2018-06-26 RX ORDER — LISINOPRIL 5 MG/1
TABLET ORAL
Qty: 90 TABLET | Refills: 0 | Status: SHIPPED | OUTPATIENT
Start: 2018-06-26 | End: 2018-10-13 | Stop reason: SDUPTHER

## 2018-07-18 ENCOUNTER — OFFICE VISIT (OUTPATIENT)
Dept: DERMATOLOGY | Facility: CLINIC | Age: 48
End: 2018-07-18
Payer: COMMERCIAL

## 2018-07-18 VITALS — BODY MASS INDEX: 35.68 KG/M2 | HEIGHT: 64 IN | WEIGHT: 209 LBS

## 2018-07-18 DIAGNOSIS — L82.1 SEBORRHEIC KERATOSES: ICD-10-CM

## 2018-07-18 DIAGNOSIS — L91.8 SKIN TAG: Primary | ICD-10-CM

## 2018-07-18 DIAGNOSIS — Z12.83 SKIN CANCER SCREENING: ICD-10-CM

## 2018-07-18 DIAGNOSIS — D22.9 MULTIPLE BENIGN NEVI: ICD-10-CM

## 2018-07-18 DIAGNOSIS — D48.5 NEOPLASM OF UNCERTAIN BEHAVIOR OF SKIN: ICD-10-CM

## 2018-07-18 PROCEDURE — 11100 PR BIOPSY OF SKIN LESION: CPT | Mod: S$GLB,,, | Performed by: DERMATOLOGY

## 2018-07-18 PROCEDURE — 88305 TISSUE EXAM BY PATHOLOGIST: CPT | Performed by: PATHOLOGY

## 2018-07-18 PROCEDURE — 99203 OFFICE O/P NEW LOW 30 MIN: CPT | Mod: 25,S$GLB,, | Performed by: DERMATOLOGY

## 2018-07-18 PROCEDURE — 99999 PR PBB SHADOW E&M-EST. PATIENT-LVL III: CPT | Mod: PBBFAC,,, | Performed by: DERMATOLOGY

## 2018-07-18 PROCEDURE — 3008F BODY MASS INDEX DOCD: CPT | Mod: S$GLB,,, | Performed by: DERMATOLOGY

## 2018-07-18 NOTE — PROGRESS NOTES
Subjective:       Patient ID:  Sondra Beltran is a 48 y.o. female who presents for   Chief Complaint   Patient presents with    Mole     New patient presents today to have multiple moles on body checked.  Last seen by a derm in 2006.  Has had moles removed for cosmetic reasons, but does not recall any biopsy or pathology report.   No moles that are changing or concerning.    Neg PMHx skin cancer  Neg FMHx skin cancer      Mole     growth on left neck, irritated by necklace, months, desires removal.     Review of Systems   Constitutional: Negative for fever, fatigue, night sweats and malaise.   Skin: Positive for activity-related sunscreen use and wears hat (sometimes). Negative for daily sunscreen use.   Hematologic/Lymphatic: Bruises/bleeds easily.        Objective:    Physical Exam   Constitutional: She appears well-developed and well-nourished. No distress.   HENT:   Mouth/Throat: Lips normal.    Eyes: Lids are normal.  No conjunctival no injection.   Cardiovascular: There is no local extremity swelling and no dependent edema.     Neurological: She is alert and oriented to person, place, and time. She is not disoriented.   Psychiatric: She has a normal mood and affect.   Skin:   Areas Examined (abnormalities noted in diagram):   Head / Face Inspection Performed  Neck Inspection Performed  Chest / Axilla Inspection Performed  Abdomen Inspection Performed  Back Inspection Performed  RUE Inspected  LUE Inspection Performed  RLE Inspected  LLE Inspection Performed                   Diagram Legend     Erythematous scaling macule/papule c/w actinic keratosis       Vascular papule c/w angioma      Pigmented verrucoid papule/plaque c/w seborrheic keratosis      Yellow umbilicated papule c/w sebaceous hyperplasia      Irregularly shaped tan macule c/w lentigo     1-2 mm smooth white papules consistent with Milia      Movable subcutaneous cyst with punctum c/w epidermal inclusion cyst      Subcutaneous movable  cyst c/w pilar cyst      Firm pink to brown papule c/w dermatofibroma      Pedunculated fleshy papule(s) c/w skin tag(s)      Evenly pigmented macule c/w junctional nevus     Mildly variegated pigmented, slightly irregular-bordered macule c/w mildly atypical nevus      Flesh colored to evenly pigmented papule c/w intradermal nevus       Pink pearly papule/plaque c/w basal cell carcinoma      Erythematous hyperkeratotic cursted plaque c/w SCC      Surgical scar with no sign of skin cancer recurrence      Open and closed comedones      Inflammatory papules and pustules      Verrucoid papule consistent consistent with wart     Erythematous eczematous patches and plaques     Dystrophic onycholytic nail with subungual debris c/w onychomycosis     Umbilicated papule    Erythematous-base heme-crusted tan verrucoid plaque consistent with inflamed seborrheic keratosis     Erythematous Silvery Scaling Plaque c/w Psoriasis     See annotation          Assessment / Plan:      Pathology Orders:     Normal Orders This Visit    Tissue Specimen To Pathology, Dermatology     Questions:    Directional Terms:  Other(comment)    Clinical information:  nevus r/o atypia    Specific Site:  left upper back        Skin tag, left neck  Verbal consent obtained. 1 lesions removed with scissor snip removal after anesthesia with 1% lidocaine with epinephrine. Hemostasis achieved with aluminum chloride and hyfrecation. No complications.      Neoplasm of uncertain behavior of skin  -     Tissue Specimen To Pathology, Dermatology    Shave biopsy procedure note:    Shave biopsy performed after verbal consent including risk of infection, scar, recurrence, need for additional treatment of site. Area prepped with alcohol, anesthetized with approximately 1.0cc of 1% lidocaine with epinephrine. Lesional tissue shaved with razor blade. Hemostasis achieved with application of aluminum chloride followed by hyfrecation. No complications. Dressing applied.  Wound care explained.        Skin cancer screening  Upper body skin examination performed today including at least 6 points as noted in physical examination. Suspicious lesions noted.      Multiple benign nevi, trunk and extremities  Discussed ABCDE's of nevi.  Monitor for new mole or moles that are becoming bigger, darker, irritated, or developing irregular borders.       Seborrheic keratoses, trunk  These are benign inherited growths without a malignant potential. Reassurance given to patient. No treatment is necessary.                Follow-up in about 6 months (around 1/18/2019).

## 2018-07-26 ENCOUNTER — OFFICE VISIT (OUTPATIENT)
Dept: FAMILY MEDICINE | Facility: CLINIC | Age: 48
End: 2018-07-26
Payer: COMMERCIAL

## 2018-07-26 VITALS
SYSTOLIC BLOOD PRESSURE: 96 MMHG | DIASTOLIC BLOOD PRESSURE: 61 MMHG | BODY MASS INDEX: 34.36 KG/M2 | HEART RATE: 92 BPM | TEMPERATURE: 99 F | WEIGHT: 201.25 LBS | HEIGHT: 64 IN

## 2018-07-26 DIAGNOSIS — D84.9 IMMUNE DEFICIENCY DISORDER: ICD-10-CM

## 2018-07-26 DIAGNOSIS — E66.9 OBESITY (BMI 30-39.9): ICD-10-CM

## 2018-07-26 DIAGNOSIS — I10 BENIGN ESSENTIAL HTN: ICD-10-CM

## 2018-07-26 DIAGNOSIS — J45.40 MODERATE PERSISTENT ASTHMA WITHOUT COMPLICATION: ICD-10-CM

## 2018-07-26 DIAGNOSIS — Z00.00 ANNUAL PHYSICAL EXAM: Primary | ICD-10-CM

## 2018-07-26 PROCEDURE — 99396 PREV VISIT EST AGE 40-64: CPT | Mod: S$GLB,,, | Performed by: NURSE PRACTITIONER

## 2018-07-26 PROCEDURE — 99999 PR PBB SHADOW E&M-EST. PATIENT-LVL III: CPT | Mod: PBBFAC,,, | Performed by: NURSE PRACTITIONER

## 2018-07-26 RX ORDER — AZELASTINE 1 MG/ML
SPRAY, METERED NASAL
Refills: 1 | COMMUNITY
Start: 2018-06-08 | End: 2019-02-01 | Stop reason: SDUPTHER

## 2018-07-26 NOTE — PROGRESS NOTES
Subjective:       Patient ID: Sondra Beltran is a 48 y.o. female.    Chief Complaint: Annual Exam    Ms. Beltran presents to the clinic today for annual physical exam.  She has history of asthma and CVID.  She sees Dr. Barnett.  She is obese and attempts to lose weight when she is not taking steroids, since the steroids cause her to have increased appetite.  She has hypertension which is well controlled.  She was seen by Endocrinology last year for abnormal TFT's and Dr. Sigala recommended primary care to check TSH periodically.  She has a thyroid nodule which will need surveillance in 2-3 years.  She denies any new problems.      Review of Systems   Constitutional: Negative for chills and fever.   HENT: Negative for congestion, ear pain and sinus pressure.    Eyes: Negative for visual disturbance.   Respiratory: Negative for cough and shortness of breath.    Cardiovascular: Negative for chest pain, palpitations and leg swelling.   Gastrointestinal: Negative for abdominal pain, constipation and diarrhea.   Musculoskeletal: Negative for arthralgias.   Neurological: Negative for dizziness, light-headedness and headaches.   Psychiatric/Behavioral: Negative for agitation and dysphoric mood.       Objective:      Physical Exam   Constitutional: She is oriented to person, place, and time. She appears well-nourished. No distress.   HENT:   Head: Normocephalic and atraumatic.   Right Ear: External ear normal.   Left Ear: External ear normal.   Mouth/Throat: Oropharynx is clear and moist. No oropharyngeal exudate.   Eyes: Pupils are equal, round, and reactive to light. Right eye exhibits no discharge. Left eye exhibits no discharge.   Neck: Neck supple. No thyromegaly present.   Cardiovascular: Normal rate and regular rhythm.  Exam reveals no gallop and no friction rub.    No murmur heard.  Pulmonary/Chest: Effort normal and breath sounds normal. No respiratory distress. She has no wheezes. She has no rales.   Abdominal:  Soft. She exhibits no distension. There is no tenderness.   Lymphadenopathy:     She has no cervical adenopathy.   Neurological: She is alert and oriented to person, place, and time. Coordination normal.   Skin: Skin is warm and dry.   Psychiatric: She has a normal mood and affect. Her behavior is normal. Thought content normal.   Vitals reviewed.          Current Outpatient Prescriptions:     ascorbic acid (VITAMIN C) 500 MG tablet, Take 500 mg by mouth 3 (three) times daily., Disp: , Rfl:     azelastine (ASTELIN) 137 mcg (0.1 %) nasal spray, , Disp: , Rfl: 1    budesonide-formoterol 160-4.5 mcg (SYMBICORT) 160-4.5 mcg/actuation HFAA, Inhale 2 puffs into the lungs every 12 (twelve) hours., Disp: , Rfl:     cetirizine-pseudoephedrine 5-120 mg Tb12, Take 1 tablet by mouth., Disp: , Rfl:     IMMUNE GLOB,WENCESLAO CAPRYLATE,IGG, (GAMUNEX IV), Inject into the vein every 30 days., Disp: , Rfl:     levocetirizine (XYZAL) 5 MG tablet, Take 5 mg by mouth every evening., Disp: , Rfl:     lisinopril (PRINIVIL,ZESTRIL) 5 MG tablet, TAKE ONE TABLET BY MOUTH ONCE DAILY, Disp: 90 tablet, Rfl: 0    montelukast (SINGULAIR) 10 mg tablet, Take 10 mg by mouth 2 (two) times daily. , Disp: , Rfl: 5    PROAIR HFA 90 mcg/actuation inhaler, Inhale 2 puffs into the lungs every 4 (four) hours as needed for Wheezing., Disp: 18 g, Rfl: 11    vitamin D 1000 units Tab, Take 185 mg by mouth once daily., Disp: , Rfl:   Assessment:       1. Annual physical exam    2. Benign essential HTN    3. Obesity (BMI 30-39.9)    4. Moderate persistent asthma without complication    5. Immune deficiency disorder        Plan:       Annual physical exam  Fasting labs.  -     TSH; Future; Expected date: 07/26/2018  -     Comprehensive metabolic panel; Future; Expected date: 07/26/2018  -     Lipid panel; Future; Expected date: 07/26/2018  -     CBC auto differential; Future; Expected date: 07/26/2018    Benign essential HTN  Stable, RTC 6 mos.    Obesity (BMI  30-39.9)  Weight loss through diet and exercise.    Moderate persistent asthma without complication  Sees Dr. Barnett 7/31.  Has been using albuterol 3-4 x/week.    Immune deficiency disorder  Follow up Immunology.    Patient readiness: acceptance and barriers:readiness    During the course of the visit the patient was educated and counseled about the following:     Hypertension:   Medication: no change.  Obesity:   Diet interventions: moderate (500 kCal/d) deficit diet and qualitative changes (increase low-fat,  high-fiber foods).  Regular aerobic exercise program discussed.    Goals: Hypertension: Reduce Blood Pressure and Obesity: Reduce calorie intake and BMI    Did patient meet goals/outcomes: Yes    The following self management tools provided: declined    Patient Instructions (the written plan) was given to the patient/family.     Time spent with patient: 30 minutes    Barriers to medications present (no )    Adverse reactions to current medications (no)    Over the counter medications reviewed (Yes)

## 2018-07-26 NOTE — PATIENT INSTRUCTIONS

## 2018-07-27 ENCOUNTER — LAB VISIT (OUTPATIENT)
Dept: LAB | Facility: HOSPITAL | Age: 48
End: 2018-07-27
Attending: NURSE PRACTITIONER
Payer: COMMERCIAL

## 2018-07-27 DIAGNOSIS — Z00.00 ANNUAL PHYSICAL EXAM: ICD-10-CM

## 2018-07-27 LAB
ALBUMIN SERPL BCP-MCNC: 3.4 G/DL
ALP SERPL-CCNC: 80 U/L
ALT SERPL W/O P-5'-P-CCNC: 25 U/L
ANION GAP SERPL CALC-SCNC: 8 MMOL/L
AST SERPL-CCNC: 21 U/L
BASOPHILS # BLD AUTO: 0.01 K/UL
BASOPHILS NFR BLD: 0.2 %
BILIRUB SERPL-MCNC: 1.1 MG/DL
BUN SERPL-MCNC: 9 MG/DL
CALCIUM SERPL-MCNC: 9.7 MG/DL
CHLORIDE SERPL-SCNC: 103 MMOL/L
CHOLEST SERPL-MCNC: 219 MG/DL
CHOLEST/HDLC SERPL: 4.6 {RATIO}
CO2 SERPL-SCNC: 29 MMOL/L
CREAT SERPL-MCNC: 0.7 MG/DL
DIFFERENTIAL METHOD: NORMAL
EOSINOPHIL # BLD AUTO: 0.1 K/UL
EOSINOPHIL NFR BLD: 1.6 %
ERYTHROCYTE [DISTWIDTH] IN BLOOD BY AUTOMATED COUNT: 12.5 %
EST. GFR  (AFRICAN AMERICAN): >60 ML/MIN/1.73 M^2
EST. GFR  (NON AFRICAN AMERICAN): >60 ML/MIN/1.73 M^2
GLUCOSE SERPL-MCNC: 97 MG/DL
HCT VFR BLD AUTO: 39.8 %
HDLC SERPL-MCNC: 48 MG/DL
HDLC SERPL: 21.9 %
HGB BLD-MCNC: 13.1 G/DL
IMM GRANULOCYTES # BLD AUTO: 0.02 K/UL
IMM GRANULOCYTES NFR BLD AUTO: 0.4 %
LDLC SERPL CALC-MCNC: 146.6 MG/DL
LYMPHOCYTES # BLD AUTO: 1.8 K/UL
LYMPHOCYTES NFR BLD: 41.3 %
MCH RBC QN AUTO: 30.4 PG
MCHC RBC AUTO-ENTMCNC: 32.9 G/DL
MCV RBC AUTO: 92 FL
MONOCYTES # BLD AUTO: 0.4 K/UL
MONOCYTES NFR BLD: 9 %
NEUTROPHILS # BLD AUTO: 2.1 K/UL
NEUTROPHILS NFR BLD: 47.5 %
NONHDLC SERPL-MCNC: 171 MG/DL
NRBC BLD-RTO: 0 /100 WBC
PLATELET # BLD AUTO: 217 K/UL
PMV BLD AUTO: 12 FL
POTASSIUM SERPL-SCNC: 4.1 MMOL/L
PROT SERPL-MCNC: 6.9 G/DL
RBC # BLD AUTO: 4.31 M/UL
SODIUM SERPL-SCNC: 140 MMOL/L
TRIGL SERPL-MCNC: 122 MG/DL
TSH SERPL DL<=0.005 MIU/L-ACNC: 0.77 UIU/ML
WBC # BLD AUTO: 4.45 K/UL

## 2018-07-27 PROCEDURE — 85025 COMPLETE CBC W/AUTO DIFF WBC: CPT

## 2018-07-27 PROCEDURE — 36415 COLL VENOUS BLD VENIPUNCTURE: CPT | Mod: PO

## 2018-07-27 PROCEDURE — 80053 COMPREHEN METABOLIC PANEL: CPT

## 2018-07-27 PROCEDURE — 80061 LIPID PANEL: CPT

## 2018-07-27 PROCEDURE — 84443 ASSAY THYROID STIM HORMONE: CPT

## 2018-08-08 RX ORDER — CYANOCOBALAMIN 1000 UG/ML
1000 INJECTION, SOLUTION INTRAMUSCULAR; SUBCUTANEOUS
Qty: 12 ML | Refills: 3 | Status: SHIPPED | OUTPATIENT
Start: 2018-08-08 | End: 2019-02-01 | Stop reason: ALTCHOICE

## 2018-09-14 ENCOUNTER — TELEPHONE (OUTPATIENT)
Dept: FAMILY MEDICINE | Facility: CLINIC | Age: 48
End: 2018-09-14

## 2018-09-14 NOTE — TELEPHONE ENCOUNTER
This matter was handled in a separate encounter on today's date. Appointment scheduled for 9-18-18 as nurse visit. Patient agreed to appointment date and time.

## 2018-09-14 NOTE — TELEPHONE ENCOUNTER
----- Message from Vianey Patel sent at 9/14/2018 12:36 PM CDT -----  Contact: self   Patient was told she can schedule her flu shot, I was unable to do so. Please call back to clarify at [phone].

## 2018-09-14 NOTE — TELEPHONE ENCOUNTER
Please see attached message from patient. Call placed to patient to assist with scheduling appointment for flu vaccine. No answer received. Left message requesting return call to office.

## 2018-09-14 NOTE — TELEPHONE ENCOUNTER
----- Message from Carin Mosquera sent at 9/14/2018 11:04 AM CDT -----  Contact: self  Patient [phone] is calling to schedule for her flu vaccine but I was not able to schedule/please advise

## 2018-09-18 ENCOUNTER — CLINICAL SUPPORT (OUTPATIENT)
Dept: FAMILY MEDICINE | Facility: CLINIC | Age: 48
End: 2018-09-18
Payer: COMMERCIAL

## 2018-09-18 PROCEDURE — 90471 IMMUNIZATION ADMIN: CPT | Mod: S$GLB,,, | Performed by: FAMILY MEDICINE

## 2018-09-18 PROCEDURE — 90686 IIV4 VACC NO PRSV 0.5 ML IM: CPT | Mod: S$GLB,,, | Performed by: FAMILY MEDICINE

## 2018-10-13 DIAGNOSIS — I10 BENIGN ESSENTIAL HTN: ICD-10-CM

## 2018-10-15 RX ORDER — LISINOPRIL 5 MG/1
TABLET ORAL
Qty: 90 TABLET | Refills: 0 | Status: SHIPPED | OUTPATIENT
Start: 2018-10-15 | End: 2019-01-10 | Stop reason: SDUPTHER

## 2019-01-10 DIAGNOSIS — I10 BENIGN ESSENTIAL HTN: ICD-10-CM

## 2019-01-10 RX ORDER — LISINOPRIL 5 MG/1
TABLET ORAL
Qty: 90 TABLET | Refills: 0 | Status: SHIPPED | OUTPATIENT
Start: 2019-01-10 | End: 2019-02-01 | Stop reason: SDUPTHER

## 2019-02-01 ENCOUNTER — OFFICE VISIT (OUTPATIENT)
Dept: FAMILY MEDICINE | Facility: CLINIC | Age: 49
End: 2019-02-01
Payer: COMMERCIAL

## 2019-02-01 ENCOUNTER — LAB VISIT (OUTPATIENT)
Dept: LAB | Facility: HOSPITAL | Age: 49
End: 2019-02-01
Attending: NURSE PRACTITIONER
Payer: COMMERCIAL

## 2019-02-01 ENCOUNTER — TELEPHONE (OUTPATIENT)
Dept: FAMILY MEDICINE | Facility: CLINIC | Age: 49
End: 2019-02-01

## 2019-02-01 VITALS
SYSTOLIC BLOOD PRESSURE: 132 MMHG | HEART RATE: 73 BPM | HEIGHT: 64 IN | TEMPERATURE: 99 F | DIASTOLIC BLOOD PRESSURE: 74 MMHG | BODY MASS INDEX: 34.89 KG/M2 | WEIGHT: 204.38 LBS

## 2019-02-01 DIAGNOSIS — E66.9 OBESITY (BMI 30-39.9): ICD-10-CM

## 2019-02-01 DIAGNOSIS — R09.81 SINUS CONGESTION: ICD-10-CM

## 2019-02-01 DIAGNOSIS — J30.89 ALLERGIC RHINITIS DUE TO OTHER ALLERGIC TRIGGER, UNSPECIFIED SEASONALITY: ICD-10-CM

## 2019-02-01 DIAGNOSIS — D84.9 IMMUNE DEFICIENCY DISORDER: ICD-10-CM

## 2019-02-01 DIAGNOSIS — J30.9 ALLERGIC RHINITIS, UNSPECIFIED SEASONALITY, UNSPECIFIED TRIGGER: Primary | ICD-10-CM

## 2019-02-01 DIAGNOSIS — I10 BENIGN ESSENTIAL HTN: ICD-10-CM

## 2019-02-01 DIAGNOSIS — I10 BENIGN ESSENTIAL HTN: Primary | ICD-10-CM

## 2019-02-01 DIAGNOSIS — J45.40 MODERATE PERSISTENT ASTHMA WITHOUT COMPLICATION: ICD-10-CM

## 2019-02-01 LAB
ALBUMIN SERPL BCP-MCNC: 3.5 G/DL
ALP SERPL-CCNC: 77 U/L
ALT SERPL W/O P-5'-P-CCNC: 25 U/L
ANION GAP SERPL CALC-SCNC: 7 MMOL/L
AST SERPL-CCNC: 22 U/L
BILIRUB SERPL-MCNC: 0.9 MG/DL
BUN SERPL-MCNC: 13 MG/DL
CALCIUM SERPL-MCNC: 9.3 MG/DL
CHLORIDE SERPL-SCNC: 104 MMOL/L
CO2 SERPL-SCNC: 29 MMOL/L
CREAT SERPL-MCNC: 0.8 MG/DL
EST. GFR  (AFRICAN AMERICAN): >60 ML/MIN/1.73 M^2
EST. GFR  (NON AFRICAN AMERICAN): >60 ML/MIN/1.73 M^2
GLUCOSE SERPL-MCNC: 78 MG/DL
POTASSIUM SERPL-SCNC: 4.2 MMOL/L
PROT SERPL-MCNC: 7.6 G/DL
SODIUM SERPL-SCNC: 140 MMOL/L

## 2019-02-01 PROCEDURE — 36415 COLL VENOUS BLD VENIPUNCTURE: CPT | Mod: PO

## 2019-02-01 PROCEDURE — 99214 OFFICE O/P EST MOD 30 MIN: CPT | Mod: S$GLB,,, | Performed by: NURSE PRACTITIONER

## 2019-02-01 PROCEDURE — 99999 PR PBB SHADOW E&M-EST. PATIENT-LVL IV: ICD-10-PCS | Mod: PBBFAC,,, | Performed by: NURSE PRACTITIONER

## 2019-02-01 PROCEDURE — 99214 PR OFFICE/OUTPT VISIT, EST, LEVL IV, 30-39 MIN: ICD-10-PCS | Mod: S$GLB,,, | Performed by: NURSE PRACTITIONER

## 2019-02-01 PROCEDURE — 80053 COMPREHEN METABOLIC PANEL: CPT

## 2019-02-01 PROCEDURE — 99999 PR PBB SHADOW E&M-EST. PATIENT-LVL IV: CPT | Mod: PBBFAC,,, | Performed by: NURSE PRACTITIONER

## 2019-02-01 RX ORDER — CETIRIZINE HYDROCHLORIDE, PSEUDOEPHEDRINE HYDROCHLORIDE 5; 120 MG/1; MG/1
1 TABLET, FILM COATED, EXTENDED RELEASE ORAL DAILY
Qty: 90 TABLET | Refills: 3 | Status: CANCELLED | OUTPATIENT
Start: 2019-02-01

## 2019-02-01 RX ORDER — MONTELUKAST SODIUM 10 MG/1
10 TABLET ORAL 2 TIMES DAILY
Qty: 180 TABLET | Refills: 3 | Status: SHIPPED | OUTPATIENT
Start: 2019-02-01 | End: 2021-09-30

## 2019-02-01 RX ORDER — AZELASTINE 1 MG/ML
2 SPRAY, METERED NASAL 2 TIMES DAILY
Qty: 90 ML | Refills: 3 | Status: SHIPPED | OUTPATIENT
Start: 2019-02-01 | End: 2020-09-28 | Stop reason: ALTCHOICE

## 2019-02-01 RX ORDER — CETIRIZINE HYDROCHLORIDE, PSEUDOEPHEDRINE HYDROCHLORIDE 5; 120 MG/1; MG/1
1 TABLET, FILM COATED, EXTENDED RELEASE ORAL DAILY
Qty: 30 TABLET | Refills: 5 | OUTPATIENT
Start: 2019-02-01

## 2019-02-01 RX ORDER — LISINOPRIL 5 MG/1
5 TABLET ORAL DAILY
Qty: 90 TABLET | Refills: 3 | Status: SHIPPED | OUTPATIENT
Start: 2019-02-01 | End: 2019-08-02 | Stop reason: SDUPTHER

## 2019-02-01 NOTE — PROGRESS NOTES
Subjective:       Patient ID: Sondra Beltran is a 48 y.o. female.    Chief Complaint: Hypertension    Ms. Beltran presents to the clinic today for six month follow up for hypertension, obesity, asthma.  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  States she has multiple food allergies and is limited in what she can eat which makes it difficult to eat healthy food.  She has gained 3 lb in the past six months.  She is not exercising regularly because she was not feeling well for several months and was taking steroids.  She complains of bloody nasal drainage for the past week.  She does have nasal congestion.  Symptoms are on and off.  She has cough on and off, no wheezing or shortness of breath.  She had pneumonia vaccines with Dr. Barnett and mammogram with Dr. Berger.      Review of Systems   Constitutional: Negative for chills and fever.   HENT: Positive for congestion, sinus pressure and sneezing. Negative for ear pain, postnasal drip and rhinorrhea.         Blood when blowing nose   Respiratory: Positive for cough. Negative for shortness of breath and wheezing.    Cardiovascular: Negative for chest pain, palpitations and leg swelling.   Gastrointestinal: Negative for abdominal pain, constipation and diarrhea.   Allergic/Immunologic: Positive for environmental allergies and food allergies.   Neurological: Positive for headaches. Negative for dizziness.       Objective:      Physical Exam   Constitutional: She is oriented to person, place, and time. She appears well-nourished. No distress.   HENT:   Head: Normocephalic and atraumatic.   Right Ear: External ear normal.   Left Ear: External ear normal.   Nose: No mucosal edema or rhinorrhea. No epistaxis.   Mouth/Throat: Oropharynx is clear and moist. No oropharyngeal exudate or posterior oropharyngeal erythema.   Eyes: Pupils are equal, round, and reactive to light. Right eye exhibits no discharge. Left eye exhibits no discharge.   Neck: Neck supple. No  thyromegaly present.   Cardiovascular: Normal rate and regular rhythm. Exam reveals no gallop and no friction rub.   No murmur heard.  Pulmonary/Chest: Effort normal and breath sounds normal. No respiratory distress. She has no wheezes. She has no rales.   Abdominal: Soft. She exhibits no distension. There is no tenderness.   Lymphadenopathy:     She has no cervical adenopathy.   Neurological: She is alert and oriented to person, place, and time. Coordination normal.   Skin: Skin is warm and dry.   Psychiatric: She has a normal mood and affect. Her behavior is normal. Thought content normal.   Vitals reviewed.          Current Outpatient Medications:     ascorbic acid (VITAMIN C) 500 MG tablet, Take 500 mg by mouth 3 (three) times daily., Disp: , Rfl:     cetirizine-pseudoephedrine 5-120 mg Tb12, Take 1 tablet by mouth., Disp: , Rfl:     IMMUNE GLOB,WENCESLAO CAPRYLATE,IGG, (GAMUNEX IV), Inject into the vein every 30 days., Disp: , Rfl:     levocetirizine (XYZAL) 5 MG tablet, Take 5 mg by mouth every evening., Disp: , Rfl:     PROAIR HFA 90 mcg/actuation inhaler, Inhale 2 puffs into the lungs every 4 (four) hours as needed for Wheezing., Disp: 18 g, Rfl: 11    tiotropium bromide (SPIRIVA RESPIMAT) 1.25 mcg/actuation Mist, Inhale 2.5 mcg into the lungs 2 (two) times daily. Controller, Disp: , Rfl:     vitamin D 1000 units Tab, Take 185 mg by mouth once daily., Disp: , Rfl:     azelastine (ASTELIN) 137 mcg (0.1 %) nasal spray, 2 sprays (274 mcg total) by Nasal route 2 (two) times daily., Disp: 90 mL, Rfl: 3    lisinopril (PRINIVIL,ZESTRIL) 5 MG tablet, Take 1 tablet (5 mg total) by mouth once daily., Disp: 90 tablet, Rfl: 3    montelukast (SINGULAIR) 10 mg tablet, Take 1 tablet (10 mg total) by mouth 2 (two) times daily., Disp: 180 tablet, Rfl: 3  Assessment:       1. Benign essential HTN    2. Obesity (BMI 30-39.9)    3. Moderate persistent asthma without complication    4. Allergic rhinitis due to other allergic  trigger, unspecified seasonality    5. Sinus congestion    6. Immune deficiency disorder        Plan:       Benign essential HTN  Stable, continue current medication.  -     Comprehensive metabolic panel; Future; Expected date: 02/01/2019  -     lisinopril (PRINIVIL,ZESTRIL) 5 MG tablet; Take 1 tablet (5 mg total) by mouth once daily.  Dispense: 90 tablet; Refill: 3    Obesity (BMI 30-39.9)  See below.    Moderate persistent asthma without complication  -     montelukast (SINGULAIR) 10 mg tablet; Take 1 tablet (10 mg total) by mouth 2 (two) times daily.  Dispense: 180 tablet; Refill: 3    Allergic rhinitis due to other allergic trigger, unspecified seasonality  -     montelukast (SINGULAIR) 10 mg tablet; Take 1 tablet (10 mg total) by mouth 2 (two) times daily.  Dispense: 180 tablet; Refill: 3  -     azelastine (ASTELIN) 137 mcg (0.1 %) nasal spray; 2 sprays (274 mcg total) by Nasal route 2 (two) times daily.  Dispense: 90 mL; Refill: 3    Sinus congestion  Advised to ask Dr. Barnett if she can hold her levocetirizine at night since this may be drying her out too much at this time.    Use humidifier.  Saline nasal spray.    Immune deficiency disorder  It is counterintuitive she would have immune deficiency disorder as well as severe allergies and asthma.  Will request records from Dr. Barnett.    Patient readiness: acceptance and barriers:none    During the course of the visit the patient was educated and counseled about the following:     Hypertension:   Medication: no change.  Obesity:   General weight loss/lifestyle modification strategies discussed (elicit support from others; identify saboteurs; non-food rewards, etc).  Diet interventions: qualitative changes (increase low-fat,  high-fiber foods).    Goals: Hypertension: Reduce Blood Pressure and Obesity: Reduce calorie intake and BMI    Did patient meet goals/outcomes: Yes    The following self management tools provided: declined    Patient Instructions (the  written plan) was given to the patient/family.     Time spent with patient: 15 minutes    Barriers to medications present (no )    Adverse reactions to current medications (no)    Over the counter medications reviewed (Yes)      RTC 6 mos

## 2019-02-01 NOTE — LETTER
February 1, 2019                 [facility]  [address] [address]  Phone: [phone]  Fax: [phone] February 1, 2019     Patient: Sondra Beltran    YOB: 1970   Date of Visit: 2/1/2019       We are seeing Sondra Beltran in the clinic today at [facility].  No primary care provider on file. is their PCP.  She/He has an outstanding lab/procedure at this time when reviewing their chart.  To help with our Health Maintenance records will you please supply the following:      []  Mammogram                                                []  Colonoscopy   []  Pap Smear                                                    []  Lab Results   []  Dexa scans                                                   []  Eye Exam   []  Foot Exam                                                [x] Other last office notes    [x]  Outside Immunizations                                               Please Fax to [facility] at [phone]    Thank you for your help, Mer STILES MA       Sincerely      Mer Jon MA  [facility]  [address] [facility] [address]  Phone [phone]  Fax [phone]

## 2019-02-01 NOTE — LETTER
February 1, 2019                 [facility]  [address]  [facility] [address]  Phone: [phone]  Fax: [phone] February 1, 2019     Patient: Sondra Beltran    YOB: 1970   Date of Visit: 2/1/2019       We are seeing Sondra Beltran in the clinic today at [facility].  No primary care provider on file. is their PCP.  She/He has an outstanding lab/procedure at this time when reviewing their chart.  To help with our Health Maintenance records will you please supply the following:      [x]  Mammogram                                                []  Colonoscopy   [x]  Pap Smear                                                    []  Lab Results   []  Dexa scans                                                   []  Eye Exam   []  Foot Exam                                                     [] Other___________   []  Outside Immunizations                                               Please Fax to [facility] at [phone]    Thank you for your help, Mer STILES MA       Sincerely      Mer Jon MA  [facility]  [address] [facility] [address]  Phone [phone]  Fax [phone]

## 2019-02-01 NOTE — TELEPHONE ENCOUNTER
Former Dr. Pereira patient.  She was seen today and requests refill cetirizine-pseudoephedrine for allergies.  She is establishing with Dr. Monk in 6 mos.

## 2019-02-01 NOTE — TELEPHONE ENCOUNTER
Please let patient know on call doctor refused her cetirizine-pseudoephedrine because she has been getting this from Dr. Dixon.

## 2019-02-04 ENCOUNTER — TELEPHONE (OUTPATIENT)
Dept: FAMILY MEDICINE | Facility: CLINIC | Age: 49
End: 2019-02-04

## 2019-02-04 NOTE — TELEPHONE ENCOUNTER
----- Message from Carin Pride sent at 2/4/2019  9:14 AM CST -----  Contact: Sondra  Type:  Patient Returning Call    Who Called:  patient  Who Left Message for Patient:  Not sure  Does the patient know what this is regarding?:  Not sure  Best Call Back Number:  [phone] until 1:30  Additional Information:  na

## 2019-02-07 ENCOUNTER — TELEPHONE (OUTPATIENT)
Dept: ADMINISTRATIVE | Facility: HOSPITAL | Age: 49
End: 2019-02-07

## 2019-03-15 ENCOUNTER — OFFICE VISIT (OUTPATIENT)
Dept: UROLOGY | Facility: CLINIC | Age: 49
End: 2019-03-15
Payer: COMMERCIAL

## 2019-03-15 VITALS
SYSTOLIC BLOOD PRESSURE: 156 MMHG | HEART RATE: 78 BPM | BODY MASS INDEX: 35.87 KG/M2 | WEIGHT: 210.13 LBS | DIASTOLIC BLOOD PRESSURE: 85 MMHG | HEIGHT: 64 IN

## 2019-03-15 DIAGNOSIS — N39.46 MIXED INCONTINENCE: ICD-10-CM

## 2019-03-15 DIAGNOSIS — N36.1 URETHRAL DIVERTICULUM: Primary | ICD-10-CM

## 2019-03-15 DIAGNOSIS — N89.8 VAGINAL LESION: ICD-10-CM

## 2019-03-15 DIAGNOSIS — N32.81 OAB (OVERACTIVE BLADDER): ICD-10-CM

## 2019-03-15 LAB
BILIRUB SERPL-MCNC: NORMAL MG/DL
BLOOD URINE, POC: NORMAL
COLOR, POC UA: NORMAL
GLUCOSE UR QL STRIP: NORMAL
KETONES UR QL STRIP: NORMAL
LEUKOCYTE ESTERASE URINE, POC: NORMAL
NITRITE, POC UA: NORMAL
PH, POC UA: 7
PROTEIN, POC: NORMAL
SPECIFIC GRAVITY, POC UA: 1.01
UROBILINOGEN, POC UA: NORMAL

## 2019-03-15 PROCEDURE — 51725 SIMPLE CYSTOMETROGRAM: CPT | Mod: S$GLB,,, | Performed by: UROLOGY

## 2019-03-15 PROCEDURE — 99999 PR PBB SHADOW E&M-EST. PATIENT-LVL IV: CPT | Mod: PBBFAC,,, | Performed by: UROLOGY

## 2019-03-15 PROCEDURE — 81002 POCT URINE DIPSTICK WITHOUT MICROSCOPE: ICD-10-PCS | Mod: S$GLB,,, | Performed by: UROLOGY

## 2019-03-15 PROCEDURE — 99205 PR OFFICE/OUTPT VISIT, NEW, LEVL V, 60-74 MIN: ICD-10-PCS | Mod: 25,S$GLB,, | Performed by: UROLOGY

## 2019-03-15 PROCEDURE — 99205 OFFICE O/P NEW HI 60 MIN: CPT | Mod: 25,S$GLB,, | Performed by: UROLOGY

## 2019-03-15 PROCEDURE — 81002 URINALYSIS NONAUTO W/O SCOPE: CPT | Mod: S$GLB,,, | Performed by: UROLOGY

## 2019-03-15 PROCEDURE — 51725 PR SIMPLE CYSTOMETROGRAM: ICD-10-PCS | Mod: S$GLB,,, | Performed by: UROLOGY

## 2019-03-15 PROCEDURE — 99999 PR PBB SHADOW E&M-EST. PATIENT-LVL IV: ICD-10-PCS | Mod: PBBFAC,,, | Performed by: UROLOGY

## 2019-03-15 RX ORDER — OXYBUTYNIN CHLORIDE 10 MG/1
10 TABLET, EXTENDED RELEASE ORAL DAILY
Qty: 90 TABLET | Refills: 0 | Status: SHIPPED | OUTPATIENT
Start: 2019-03-15 | End: 2019-05-29

## 2019-03-15 NOTE — PROGRESS NOTES
[facility] Note - [place]  Staff: MD Reza    Referring provider and please cc:    [facility]    Chief Complaint: urethral diverticulum    Subjective:        HPI: Sondra Beltran is a 48 y.o. female presents with     She presented to her gynecologist with c/o cyst in the vagina that recurred after drainage in her 20s. Between her first and second visit the pt and  saw that was increasing in size over 3 months (size of grape). She had called me out of concern that this could be a urethral diverticulum and I recommended an mri pelvis. 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 I reviewed the images but the diverticulum is not clear. Not tender to touch. Pt states she has a bulge. Denies and dribbling, dysuria or discharge. Some dyspareunia. No h/o recurrent uti's.     She also has mixed incontinence with OAB. JESSI=UUI. She is . +hysterectomy. No pads needed. Voids up to 10x during day and 4-5x a night. Drinks coffee in am (2 cups) and 1 cup with tea with dinner occ. +constipation, has a bm every other day and takes daily stool softeners.     CVID treated by  with a shot every 3 weeks.     ECOG Status: 0    REVIEW OF SYSTEMS:  General ROS: no fevers, no chills  Psychological ROS: no depression  Endocrine ROS: no heat or cold  Respiratory ROS: no SOB  Cardiovascular ROS: no CP  Gastrointestinal ROS: no abdominal pain, no constipation, no diarrhea, no BRBPR  Musculoskeletal ROS: no muscle pain  Neurological ROS: no headaches  Dermatological ROS: no rashes  HEENT: +glasses, no sinus   ROS: per HPI     PMHx:  Past Medical History:   Diagnosis Date    Asthma     Benign essential HTN 2017    CVID (common variable immunodeficiency)     Neck strain 2016    Thyroid nodule 2017     Kidney stones: No    PSHx:  Past Surgical History:   Procedure Laterality Date    cervial cone surgery       HYSTERECTOMY      KNEE SURGERY      nose surgery       Urologic or Gynecologic Surgery: none    Stents/Valves/Foreign Bodies: none  Cardiologist: none  Gynecologist:   Immunogist:Dr.Querish Enciso Hx:   malignancies: father with bladder cancer 60s (smoker), gyn malignancies: No . Gm and aunt had colon cancer  kidney stones: No     Soc Hx:  No tobacco.    No alcohol  Lives in [place]  :yes  Children: 2  Occupation:     Allergies:  Bactrim [sulfamethoxazole-trimethoprim] - breaks out    Home Medications: reviewed   Urologic Medications: none  Anticoagulation: no    Objective:     Vitals:    03/15/19 0904   BP: (!) 156/85   Pulse: 78       General:WDWN in NAD  Eyes: PERRLA, normal conjunctiva  Respiratory: no increased work on breathing. No wheezing.   Cardiovascular: No obvious extremity edema. Warm and well perfused.   GI: no palpation of masses. No tenderness. No hepatosplenomegaly to palpation.  Musculoskeletal: normal range of motion of bilateral upper extremities. Normal muscle strength and tone.  Skin: no obvious rashes or lesions. No tightening of skin noted.  Neurologic: CN grossly normal. Normal sensation.   Psychiatric: awake, alert and oriented x 3. Mood and affect normal. Cooperative.    Pelvic exam  External Genitalia: normal hair distribution, no lesions  Urethral meatus: normal without prolapse or caruncle  Urethra: without tenderness or mass, just proximal to orifice she has a vaginal lesion c/w cyst vs urethral diverticulum. No discharge with palpation of lesion. Soft, fluctuant  Bladder: without fulness or tenderness  Vagina: normal appearing. No discharge or lesions. no prolapse.   Anus and perineum: appear normal  In and out cath performed with 20cc residual    Pelvic exam today (3/15/19):  negative stress test with coughing supine, negative stress test with valsalva supine  In and out cath performed with 20cc residual - urine sws not sent for sample  First sensation  to void felt at 60 cc  Significant urge felt at 130cc  Bladder filled to 150 cc  The catheter was then removed  Negative stress test with coughing supine, negative  stress test with valsalva supine  Negative stress test with coughing or valsalva  Standing      LABS REVIEW:  Urinalysis void: negative  Urine history: none    Lab Results   Component Value Date    WBC 4.45 07/27/2018    HGB 13.1 07/27/2018    HCT 39.8 07/27/2018    MCV 92 07/27/2018     07/27/2018     BMP  Lab Results   Component Value Date     02/01/2019    K 4.2 02/01/2019     02/01/2019    CO2 29 02/01/2019    BUN 13 02/01/2019    CREATININE 0.8 02/01/2019    CALCIUM 9.3 02/01/2019    ANIONGAP 7 (L) 02/01/2019    ESTGFRAFRICA >60.0 02/01/2019    EGFRNONAA >60.0 02/01/2019         PATHOLOGY REVIEW:  none    RADIOGRAPHIC REVIEW:  Mri pelvis 2/14/19  Simple fluid enhancing cystic lesion extending from the psoterior midline of the urethra measuring 7x4mm  Most c/w a urethral diverticulum at the orifice     Assessment:       1. Urethral diverticulum    2. Mixed incontinence    3. OAB (overactive bladder)    4. Vaginal lesion          Plan:     Urethral diverticulum vs vaginal lesion  -not convinced entirely this is a diverticulum. Abnormal location near the orifice. Mri did not clearly show connection to urethra and when palpated no leakage or discharge. It is fluctuant and no concern for malignancy.   -will proceed with cystoscopy on April 22nd as outpatient to evaluate of diverticular orifice and will discuss with .  -will plan for either vaginal mass excision vs diverticular repair during her summer vacation (may 24th she is out).    Types of incontinence (urine leakage)    You have mainly urge incontinence (see below)  Stress incontinence not demonstrated with 150cc so recommend kegels and keeping less than 200 in bladder   Will start ditropan/oxybutynin to see if this helps with urgency and frequency.     STRESS  incontinence- when you cough, laugh, jump or exercise and leak urine. This is NOT treated with medication. It is often caused by having babies and age causing weak tissues.  Sometimes it can improve with conservative treatment (voiding more often and kegels) or pessary. If it does not then if bothersome enough can move forward with surgical options (mesh/midurethral sling, urethral injections).   -First I recommend you empty your bladder every 2 to 3 hours. Empty well: wait a minute, lean forward on toilet but do  Not strain. The less you have in your bladder, the less you will leak urine.   -I also recommend you Do  KEGELS: 10 in the morning and 10 in the afternoon  holding each kegel for  10 seconds.    -Consider pelvic floor physical therapy in future.  -If there is no improvement in your leakage with coughing and laughing and  It is very bothersome we can consider surgery (mid urethral sling (small piece of mesh placed during an outpatient surgery) vs referral to urogynecology for pessary)    URGE incontinence- when you have the overwhelming urge to urinate but cannot make it to the bathroom. This is typically treated with changing your diet and medications. Usually this is associated with overactive bladder (urinating frequently).  If trying a medication does not help then we can move forward with surgical options (bladder pacemaker or botox).   -Please fill out a Bladder diary for 3-7 days; document every time you urinate, every time you leak or change pads. Indicate your urgency (1 being least and 5 being very high)  -Avoid foods and drinks that irritate the bladder (see sheet). Keep a diary to try to identify what bothers your bladder. Any caffeine or fizzy drink is usually a culprit,  -When you feel urge to go, STOP, KEGEL, and when urge has passed, then go to bathroom.    -If avoiding caffeine and other foods that trigger your urgency do not help, then we can a try a medication. This is usually a lifelong  medication. There are multiple medications available.  I will write you a prescription for Ditropan to take once daiily in the morning once daily if avoiding bladder irritants do not work.   · All of them cause dry mouth and constipation to some extent except for myrbetriq/mirbegron.   · All of the overactive bladder medications take four to 6 weeks to work except for ditropan/oxybutynin.  ·  If your insurance does not cover the medication I write you for, you will need to call them and find out your co-pays for overactive bladder medications and then notify us which ones are affordable. You can ask them about the following co-pays for each: Vesicare, Enablex, Sanctura, Detrol, Ditropan/oxtybuynin extended release, Myrbetriq.  · Oxybutynin/ditropan 5mg immediate release are the cheapest and have the most side effects and so I typically do not prescribe these.    · For dry mouth: Try sour, sugar free lozenge or gum or try therabreath products over the counter.    · For constipation you can take 2 fiber gummies a day in addition to your regular regimen.   · We will have you return in 4 weeks with the nurse practioner to see how you are doing on the medication. If there is no improvement we will try another medication for 4 weeks. If there is still no improvement we can move forward. We are looking to see if you urinate less often, with less urge or leak less.   · Sometimes if you have significant stress incontinence, you can never hold enough in your bladder and you develop overactive bladder and urge incontinence that does not improve until your stress incontinence is treated.     Route  and   Pick a date on a Thursday June 6, June 7. Will book out once I do cysto and confirm with her.    Sondra Cobb MD

## 2019-04-15 ENCOUNTER — APPOINTMENT (OUTPATIENT)
Dept: LAB | Facility: HOSPITAL | Age: 49
End: 2019-04-15
Attending: UROLOGY
Payer: COMMERCIAL

## 2019-04-15 ENCOUNTER — CLINICAL SUPPORT (OUTPATIENT)
Dept: UROLOGY | Facility: CLINIC | Age: 49
End: 2019-04-15
Payer: COMMERCIAL

## 2019-04-15 DIAGNOSIS — R82.998 CELLS AND CASTS IN URINE: Primary | ICD-10-CM

## 2019-04-15 LAB
BILIRUB UR QL STRIP: NEGATIVE
CLARITY UR: CLEAR
COLOR UR: YELLOW
GLUCOSE UR QL STRIP: NEGATIVE
HGB UR QL STRIP: NEGATIVE
KETONES UR QL STRIP: NEGATIVE
LEUKOCYTE ESTERASE UR QL STRIP: NEGATIVE
NITRITE UR QL STRIP: NEGATIVE
PH UR STRIP: 7 [PH] (ref 5–8)
PROT UR QL STRIP: NEGATIVE
SP GR UR STRIP: <=1.005 (ref 1–1.03)
URN SPEC COLLECT METH UR: ABNORMAL
UROBILINOGEN UR STRIP-ACNC: NEGATIVE EU/DL

## 2019-04-15 PROCEDURE — 87086 URINE CULTURE/COLONY COUNT: CPT

## 2019-04-15 PROCEDURE — 99499 NO LOS: ICD-10-PCS | Mod: S$GLB,,, | Performed by: UROLOGY

## 2019-04-15 PROCEDURE — 81003 URINALYSIS AUTO W/O SCOPE: CPT

## 2019-04-15 PROCEDURE — 99499 UNLISTED E&M SERVICE: CPT | Mod: S$GLB,,, | Performed by: UROLOGY

## 2019-04-16 LAB
BACTERIA UR CULT: NORMAL
BACTERIA UR CULT: NORMAL

## 2019-04-22 ENCOUNTER — HOSPITAL ENCOUNTER (OUTPATIENT)
Facility: AMBULARY SURGERY CENTER | Age: 49
Discharge: HOME | End: 2019-04-22
Attending: UROLOGY | Admitting: UROLOGY
Payer: COMMERCIAL

## 2019-04-22 ENCOUNTER — TELEPHONE (OUTPATIENT)
Dept: UROLOGY | Facility: CLINIC | Age: 49
End: 2019-04-22

## 2019-04-22 ENCOUNTER — TELEPHONE (OUTPATIENT)
Dept: UROGYNECOLOGY | Facility: CLINIC | Age: 49
End: 2019-04-22

## 2019-04-22 DIAGNOSIS — N36.1 URETHRAL DIVERTICULUM: ICD-10-CM

## 2019-04-22 DIAGNOSIS — N89.8 VAGINAL LESION: Primary | ICD-10-CM

## 2019-04-22 LAB
BACTERIA SPEC CULT: NORMAL
BILIRUB SERPL-MCNC: NORMAL MG/DL
BLOOD URINE, POC: NORMAL
CASTS: NORMAL
COLOR, POC UA: NORMAL
CRYSTALS: NORMAL
GLUCOSE UR QL STRIP: NORMAL
KETONES UR QL STRIP: NORMAL
LEUKOCYTE ESTERASE URINE, POC: NORMAL
NITRITE, POC UA: NORMAL
PH, POC UA: 5
PROTEIN, POC: NORMAL
RBC CELLS COUNTED: NORMAL
SPECIFIC GRAVITY, POC UA: 1.01
UROBILINOGEN, POC UA: NORMAL
WHITE BLOOD CELLS: NORMAL

## 2019-04-22 PROCEDURE — 52000 CYSTOURETHROSCOPY: CPT | Mod: ,,, | Performed by: UROLOGY

## 2019-04-22 PROCEDURE — 52000 PR CYSTOURETHROSCOPY: ICD-10-PCS | Mod: ,,, | Performed by: UROLOGY

## 2019-04-22 PROCEDURE — 52000 CYSTOURETHROSCOPY: CPT | Performed by: UROLOGY

## 2019-04-22 RX ORDER — LIDOCAINE HYDROCHLORIDE 20 MG/ML
JELLY TOPICAL
Status: DISCONTINUED | OUTPATIENT
Start: 2019-04-22 | End: 2019-04-22 | Stop reason: HOSPADM

## 2019-04-22 RX ORDER — LIDOCAINE HYDROCHLORIDE 20 MG/ML
JELLY TOPICAL
Status: DISCONTINUED
Start: 2019-04-22 | End: 2019-04-22 | Stop reason: HOSPADM

## 2019-04-22 RX ORDER — CIPROFLOXACIN 500 MG/1
500 TABLET ORAL ONCE
Status: COMPLETED | OUTPATIENT
Start: 2019-04-22 | End: 2019-04-22

## 2019-04-22 RX ORDER — WATER 1 ML/ML
IRRIGANT IRRIGATION
Status: DISCONTINUED | OUTPATIENT
Start: 2019-04-22 | End: 2019-04-22 | Stop reason: HOSPADM

## 2019-04-22 RX ADMIN — CIPROFLOXACIN 500 MG: 500 TABLET ORAL at 02:04

## 2019-04-22 NOTE — OP NOTE
[facility] Procedure Note  Date: 04/22/2019    Procedure:   1. Flexible cysto-uretheroscopy   2. Vaginoscopy     Pre Procedure Diagnosis:vaginal lesion vs urethral diverticulum    Post Procedure Diagnosis: same, see below for findings    Surgeon: Sondra Cobb MD    Specimen: none    Anesthesia: 2% uro-jet lidocaine jelly for local analgesia    Indications: Sondra Beltran is a 49 y.o. female  With above pre-procedure diagnosis. Urine reviewed. H&P reviewed.     Procedure in detail:   Flexible cysto-urethroscopy was performed after consent was obtained.  The risks and benefits were explained.    2% lidocaine urojet was used for local analgesia.  The genitalia was prepped and draped in the sterile fashion with betadine.    The flexible scope was advanced into the urethra and into the bladder.  Bilateral ureteral orifice were evaluated and noted to be normal with clear efflux.  The bladder was completely surveyed in a systematic fashion and the scope was retroflexed.    Cystoscopy findings as below in findings.   No strictures were noted.     The patient tolerated the procedure well without complication.    Cystoscope was then inserted into vagina and vaginoscopy performed    Findings: (pictures were  uploaded into media)  1. Cystoscopy findings:  Normal cystoscopy, bladder neck polyps, b9. No os identified. Cystoscope carefully pulled to distal uethra where lesion was seen and it did not appear to connect. Squeezed fluid filled lesion and nothing appeared to drain.   2. Vaginal exam findings: 1.5cm cyst/lesion on distal anterior vaginal wall that was soft and filled with fluid, did not compress or result in urethral drainage with compression. Nontender. Vaginoscopy normal without any abnormalities.   3. Other findings: none  noBladder biopsy:       Assesment: Sondra Beltran is a 49 y.o. female with vaginal lesion vs urethral diverticulum. Very likely vaginal cyst but bc it is not  entirely clear will schedule with assist of .     Plan:   F/u for surgery on June 6 (will need to confirm with ) for excision of vaginal lesion, possible urethral diverticulectomy.   F/u 1 week before this for a cath urine sample and pre-op visit at the hospital if deemed necessary by pre-op  cipro x 1 in recovery       Sondra Cobb MD

## 2019-04-22 NOTE — H&P
[facility] H&P Note - [place]  Staff: MD Reza     Referring provider and please cc:    [facility]     Chief Complaint: urethral diverticulum     Subjective:        HPI: Sondra Beltran is a 48 y.o. female presents with      She presented to her gynecologist with c/o cyst in the vagina that recurred after drainage in her 20s. Between her first and second visit the pt and  saw that was increasing in size over 3 months (size of grape). She had called me out of concern that this could be a urethral diverticulum and I recommended an mri pelvis. 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 I reviewed the images but the diverticulum is not clear. Not tender to touch. Pt states she has a bulge. Denies and dribbling, dysuria or discharge. Some dyspareunia. No h/o recurrent uti's.      She also has mixed incontinence with OAB. JESSI=UUI. She is . +hysterectomy. No pads needed. Voids up to 10x during day and 4-5x a night. Drinks coffee in am (2 cups) and 1 cup with tea with dinner occ. +constipation, has a bm every other day and takes daily stool softeners.      CVID treated by  with a shot every 3 weeks.     Started on ditropan for oab med     Here today for cysto to determine tic vs cyst     ECOG Status: 0     REVIEW OF SYSTEMS:  General ROS: no fevers, no chills  Psychological ROS: no depression  Endocrine ROS: no heat or cold  Respiratory ROS: no SOB  Cardiovascular ROS: no CP  Gastrointestinal ROS: no abdominal pain, no constipation, no diarrhea, no BRBPR  Musculoskeletal ROS: no muscle pain  Neurological ROS: no headaches  Dermatological ROS: no rashes  HEENT: +glasses, no sinus   ROS: per HPI     PMHx:       Past Medical History:   Diagnosis Date    Asthma      Benign essential HTN 2017    CVID (common variable immunodeficiency)      Neck strain 2016    Thyroid nodule 2017      Kidney stones:  No     PSHx:        Past Surgical History:   Procedure Laterality Date    cervial cone surgery        HYSTERECTOMY        KNEE SURGERY        nose surgery          Urologic or Gynecologic Surgery: none     Stents/Valves/Foreign Bodies: none  Cardiologist: none  Gynecologist:   Immunogist:Dr.Querish Enciso Hx:   malignancies: father with bladder cancer 60s (smoker), gyn malignancies: No . Gm and aunt had colon cancer  kidney stones: No      Soc Hx:  No tobacco.    No alcohol  Lives in [place]  :yes  Children: 2  Occupation:      Allergies:  Bactrim [sulfamethoxazole-trimethoprim] - breaks out     Home Medications: reviewed   Urologic Medications: none  Anticoagulation: no     Objective:      Vitals:    04/22/19 1249   BP: 118/74   Pulse: (!) 111   Resp: 18   Temp: 98.2 °F (36.8 °C)          General:WDWN in NAD  Eyes: PERRLA, normal conjunctiva  Respiratory: no increased work on breathing. No wheezing.   Cardiovascular: No obvious extremity edema. Warm and well perfused.   GI: no palpation of masses. No tenderness. No hepatosplenomegaly to palpation.  Musculoskeletal: normal range of motion of bilateral upper extremities. Normal muscle strength and tone.  Skin: no obvious rashes or lesions. No tightening of skin noted.  Neurologic: CN grossly normal. Normal sensation.   Psychiatric: awake, alert and oriented x 3. Mood and affect normal. Cooperative.     Pelvic exam  External Genitalia: normal hair distribution, no lesions  Urethral meatus: normal without prolapse or caruncle  Urethra: without tenderness or mass, just proximal to orifice she has a vaginal lesion c/w cyst vs urethral diverticulum. No discharge with palpation of lesion. Soft, fluctuant  Bladder: without fulness or tenderness  Vagina: normal appearing. No discharge or lesions. no prolapse.   Anus and perineum: appear normal  In and out cath performed with 20cc residual     Pelvic exam today (3/15/19):  negative stress  test with coughing supine, negative stress test with valsalva supine  In and out cath performed with 20cc residual - urine sws not sent for sample  First sensation to void felt at 60 cc  Significant urge felt at 130cc  Bladder filled to 150 cc  The catheter was then removed  Negative stress test with coughing supine, negative  stress test with valsalva supine  Negative stress test with coughing or valsalva  Standing        LABS REVIEW:  Urinalysis void: tr wbc  Urine history:   4/15/19 Mult org, void: neg  3/15/19 No cx, void: neg           Lab Results   Component Value Date     WBC 4.45 07/27/2018     HGB 13.1 07/27/2018     HCT 39.8 07/27/2018     MCV 92 07/27/2018      07/27/2018      BMP        Lab Results   Component Value Date      02/01/2019     K 4.2 02/01/2019      02/01/2019     CO2 29 02/01/2019     BUN 13 02/01/2019     CREATININE 0.8 02/01/2019     CALCIUM 9.3 02/01/2019     ANIONGAP 7 (L) 02/01/2019     ESTGFRAFRICA >60.0 02/01/2019     EGFRNONAA >60.0 02/01/2019            PATHOLOGY REVIEW:  none     RADIOGRAPHIC REVIEW:  Mri pelvis 2/14/19  Simple fluid enhancing cystic lesion extending from the psoterior midline of the urethra measuring 7x4mm  Most c/w a urethral diverticulum at the orifice      Assessment:       1. Urethral diverticulum    2. Mixed incontinence    3. OAB (overactive bladder)    4. Vaginal lesion           Plan:      Urethral diverticulum vs vaginal lesion  -not convinced entirely this is a diverticulum. Abnormal location near the orifice. Mri did not clearly show connection to urethra and when palpated no leakage or discharge. It is fluctuant and no concern for malignancy.   -will proceed with cystoscopy on April 22nd as outpatient to evaluate of diverticular orifice and will discuss with .  -will plan for either vaginal mass excision vs diverticular repair during her summer vacation (may 24th she is out).       URGE incontinence-  Continue ditropan 10mg  SAMEER cole   Pick a date on a Thursday June 6, June 7. Will book out once I do cysto and confirm with her.    This patient has been cleared for surgery in ambulatory surgical facility.

## 2019-04-22 NOTE — TELEPHONE ENCOUNTER
----- Message from Yaw Basurto MD sent at 4/22/2019  4:14 PM CDT -----  If they will not cover Botox, we can try a more extensive Kenalog injection and see if that helps and she may benefit from pelvic floor physical therapy  ----- Message -----  From: Sondra Cobb MD  Sent: 4/22/2019   2:33 PM  To: Sondra Cobb MD, #    Dr babita,  I just did a cysto on this pt. An mri said urethral diverticulum distally ( I could not see this). On physicial exam it looks like a vaginal cyst. On cysto today I did not find any obvious os. I would like to schedule her for vaginal cyst excision/possible urethral diverticulectomy with your assist. What days are good fo ramirez? Would you be ok for June 6? Do you agree with plan?  Thanks! Nickie

## 2019-04-22 NOTE — TELEPHONE ENCOUNTER
Spoke with patient informed her of recommendations below. Patient verbally voiced understanding. Please give patient a call to schedule a appointment within the next few couple weeks with

## 2019-04-22 NOTE — TELEPHONE ENCOUNTER
----- Message from Sondra Cobb MD sent at 4/22/2019  4:38 PM CDT -----  That sounds perfect  we will let her know. In the meantime I will put her on the books and cancel if you feel like it's just a skenes gland     Brea, please notify pt that  would like to see her in clinic first (within the next few weeks). He may be able to easily aspirate this fluid collection before we move forwards. Once you notify her let 's staff know so they can call to make the appt. 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     ----- Message -----  From: Yaw Jc MD  Sent: 4/22/2019   4:04 PM  To: Sondra Cobb MD    June 6 would be okay.  If it is a Penitas's duct cyst or congenital rest cell cyst, it may be easy to aspirated in the office and they often do not recur.  Why don't you schedule her sometime before June 6 to come in on a Monday or Tuesday when I am in the office and I can look at it with you and see if it could be aspirated.  DH.  ----- Message -----  From: Sondra Cobb MD  Sent: 4/22/2019   2:33 PM  To: Sondra Cobb MD, #    Dr jc,  I just did a cysto on this pt. An mri said urethral diverticulum distally ( I could not see this). On physicial exam it looks like a vaginal cyst. On cysto today I did not find any obvious os. I would like to schedule her for vaginal cyst excision/possible urethral diverticulectomy with your assist. What days are good fo ryou? Would you be ok for June 6? Do you agree with plan?  Thanks! Nickie

## 2019-04-22 NOTE — DISCHARGE SUMMARY
[facility]  Urology  Discharge Note - Short Stay      Patient Name: Sondra Beltran  MRN: [id number]  Discharge Date and Time:  04/22/2019 2:33 PM  Attending Physician: Sondra Cobb,*   Discharging Provider: Sondra Cobb MD  Primary Care Physician: Primary Doctor No    Final Active Diagnoses:    Diagnosis Date Noted POA    Urethral diverticulum [N36.1] 04/22/2019 Yes      Problems Resolved During this Admission:       Final Diagnoses: Same as principal problem.    Hospital Course: Patient was admitted for an outpatient procedure and tolerated the procedure well with no complications.*    Procedure(s) (LRB):  CYSTOSCOPY (N/A)  VAGINOSCOPY (N/A)     Indwelling Lines/Drains at time of discharge:   Lines/Drains/Airways          None          Discharged Condition: good    Disposition: home    Follow Up:      Patient Instructions:   No discharge procedures on file.    Medications:  Reconciled Home Medications:      Medication List      CONTINUE taking these medications    azelastine 137 mcg (0.1 %) nasal spray  Commonly known as:  ASTELIN  2 sprays (274 mcg total) by Nasal route 2 (two) times daily.     cetirizine-pseudoephedrine 5-120 mg Tb12  Take 1 tablet by mouth.     GAMUNEX IV  Inject into the vein every 30 days.     levocetirizine 5 MG tablet  Commonly known as:  XYZAL  Take 5 mg by mouth every evening.     lisinopril 5 MG tablet  Commonly known as:  PRINIVIL,ZESTRIL  Take 1 tablet (5 mg total) by mouth once daily.     montelukast 10 mg tablet  Commonly known as:  SINGULAIR  Take 1 tablet (10 mg total) by mouth 2 (two) times daily.     oxybutynin 10 MG 24 hr tablet  Commonly known as:  DITROPAN-XL  Take 1 tablet (10 mg total) by mouth once daily.     PROAIR HFA 90 mcg/actuation inhaler  Generic drug:  albuterol  Inhale 2 puffs into the lungs every 4 (four) hours as needed for Wheezing.     SPIRIVA RESPIMAT 1.25 mcg/actuation Mist  Generic drug:  tiotropium  bromide  Inhale 2.5 mcg into the lungs 2 (two) times daily. Controller     VITAMIN C 500 MG tablet  Generic drug:  ascorbic acid (vitamin C)  Take 500 mg by mouth 3 (three) times daily.     vitamin D 1000 units Tab  Commonly known as:  VITAMIN D3  Take 185 mg by mouth once daily.            Discharge Procedure Orders (must include Diet, Follow-up, Activity):  No discharge procedures on file.         Sondra Cobb MD  Urology  [facility]

## 2019-04-22 NOTE — TELEPHONE ENCOUNTER
Pt will be seen by  first in clinic to ensure this is not a skene's gland. He may be able to easily aspirae this. I will book her for a urethral diverticulectomy, vaginal lesion excision for June 6 with  to assist for now but will cancel if he finds that it is just a skene's gland    Will put a temp hold on schedule for now for that morning until 1030am

## 2019-04-22 NOTE — DISCHARGE INSTRUCTIONS
F/u for surgery on June 6 (will need to confirm with ) for excision of vaginal lesion, possible urethral diverticulectomy.   F/u 1 week before this for a cath urine sample and pre-op visit at the hospital if deemed necessary by pre-op  cipro x 1 in recovery         Before leaving, please make sure you have all your personal belongings such as glasses, purses, wallets, keys, cell phones, jewelry, jackets etc          After the procedure    · Drink plenty of fluids.  · You may have burning or light bleeding when you urinate--this is normal.  · Medications may be prescribed to ease any discomfort or prevent infection. Take these as directed.  · Call your doctor if you have heavy bleeding or blood clots, burning that lasts more than a day, a fever over 100°F  (38° C), or trouble urinating.

## 2019-04-23 VITALS
BODY MASS INDEX: 35.85 KG/M2 | SYSTOLIC BLOOD PRESSURE: 129 MMHG | TEMPERATURE: 98 F | RESPIRATION RATE: 18 BRPM | OXYGEN SATURATION: 98 % | HEIGHT: 64 IN | HEART RATE: 85 BPM | WEIGHT: 210 LBS | DIASTOLIC BLOOD PRESSURE: 77 MMHG

## 2019-04-30 ENCOUNTER — INITIAL CONSULT (OUTPATIENT)
Dept: UROGYNECOLOGY | Facility: CLINIC | Age: 49
End: 2019-04-30
Payer: COMMERCIAL

## 2019-04-30 ENCOUNTER — APPOINTMENT (OUTPATIENT)
Dept: LAB | Facility: HOSPITAL | Age: 49
End: 2019-04-30
Attending: OBSTETRICS & GYNECOLOGY
Payer: COMMERCIAL

## 2019-04-30 VITALS
BODY MASS INDEX: 35.49 KG/M2 | SYSTOLIC BLOOD PRESSURE: 131 MMHG | HEART RATE: 96 BPM | DIASTOLIC BLOOD PRESSURE: 78 MMHG | WEIGHT: 207.88 LBS | HEIGHT: 64 IN

## 2019-04-30 DIAGNOSIS — N89.8 VAGINAL MASS: ICD-10-CM

## 2019-04-30 DIAGNOSIS — N36.8 URETHRAL CYST: Primary | ICD-10-CM

## 2019-04-30 PROCEDURE — 99213 PR OFFICE/OUTPT VISIT, EST, LEVL III, 20-29 MIN: ICD-10-PCS | Mod: S$GLB,,, | Performed by: OBSTETRICS & GYNECOLOGY

## 2019-04-30 PROCEDURE — 88112 CYTOPATH CELL ENHANCE TECH: CPT | Mod: 26,,, | Performed by: PATHOLOGY

## 2019-04-30 PROCEDURE — 99999 PR PBB SHADOW E&M-EST. PATIENT-LVL III: ICD-10-PCS | Mod: PBBFAC,,, | Performed by: OBSTETRICS & GYNECOLOGY

## 2019-04-30 PROCEDURE — 88112 CYTOPATH CELL ENHANCE TECH: CPT | Performed by: PATHOLOGY

## 2019-04-30 PROCEDURE — 88112 CYTOLOGY SPECIMEN- MEDICAL CYTOLOGY (FLUID/WASH/BRUSH): ICD-10-PCS | Mod: 26,,, | Performed by: PATHOLOGY

## 2019-04-30 PROCEDURE — 99999 PR PBB SHADOW E&M-EST. PATIENT-LVL III: CPT | Mod: PBBFAC,,, | Performed by: OBSTETRICS & GYNECOLOGY

## 2019-04-30 PROCEDURE — 99213 OFFICE O/P EST LOW 20 MIN: CPT | Mod: S$GLB,,, | Performed by: OBSTETRICS & GYNECOLOGY

## 2019-04-30 RX ORDER — CYANOCOBALAMIN 1000 UG/ML
INJECTION, SOLUTION INTRAMUSCULAR; SUBCUTANEOUS
Refills: 3 | COMMUNITY
Start: 2019-04-12 | End: 2019-08-02 | Stop reason: SDUPTHER

## 2019-04-30 RX ORDER — MOXIFLOXACIN HYDROCHLORIDE 400 MG/1
TABLET ORAL
Refills: 0 | COMMUNITY
Start: 2019-04-23 | End: 2019-05-29

## 2019-04-30 NOTE — LETTER
April 30, 2019      Sondra Cobb MD  [address] [place] [address]  [address]  [place] [address]           [place] [facility]  [address] [place] [address] [address]  [place] [address]  Phone: [phone]  Fax: [phone]          Patient: Sondra Beltran   MR Number: [id number]   YOB: 1970   Date of Visit: 4/30/2019       Dear Dr. Sondra Cobb:    Thank you for referring Sondra Beltran to me for evaluation. Attached you will find relevant portions of my assessment and plan of care.    If you have questions, please do not hesitate to call me. I look forward to following Sondra Beltran along with you.    Sincerely,    Yaw Basurto MD    Enclosure  CC:  No Recipients    If you would like to receive this communication electronically, please contact [email or web address] or [phone] to request more information on [employer] access.    For providers and/or their staff who would like to refer a patient to [facility], please contact us through our one-stop-shop provider referral line, [facility], at [phone].    If you feel you have received this communication in error or would no longer like to receive these types of communications, please e-mail [email or web address]

## 2019-04-30 NOTE — PROGRESS NOTES
Subjective:     Chief Complaint:  Suburethral cyst  History of Present Illness: Sondra Beltran is a 49 y.o. female who presents for suburethral cyst.   did cystoscopy to rule out urethral diverticulum and no connection was seen and no fluid was expressed with pressure on the cyst.  She had a similar cyst but larger about 20 years ago.  It was aspirated and she says clear yellow  fluid was obtained.  And had not returned until recently she is not having any pain discharge or bleeding    Review of Systems    Constitutional: No acute distress. No weight gain/loss.  Eyes: No vision changes.  ENT: sinusitis.   Respiratory:  Asthma  Cardiovascular: No chest pain. No edema. Hypertension  Gastrointestinal: No constipation. No diarrhea. No fecal incontinence.   Genitourinary: No vaginal bleeding or discharge.  Integument/Breast: Negative  Hematologic/Lymphatic: No history of anemia.  Musculoskeletal: No back pain. No abdominal pain.  Neurological: No disc problems. No paresthesias.  Behavioral/Psych: No history of depression.   Endocrine:  Thyroid nodule  Allergy/Immune: no recent reactions     Objective:   General Exam:  General appearance: WDNF. NAD.   HEENT: Kaite. EOM's intact.  Neck: Normal thyroid.   Back: No CVA tenderness.  RESP: No SOB.  Breasts: deferred  Abdomen: Benign without localizing signs.  Extremities: No edema. No varices.  Lymphatic: noncontributory  Skin: No rashes. No lesions.  Neurologic: Intact.   Psych: Oriented.   Pelvic Exam:  V:  No lesions. No palpable nodes.   Va:No d/c bleeding or lesions.   .Meatus:No caruncle.  Small  Urethra: Non tender.  1.5 cm thin wall flaccid cyst on the left side of mid  Urethra.  It was aspirated with a 27 gauge needle and bloody fluid was obtained to be sent for cytology.  When it collapsed it did not feel completely empty and felt as if there was a nodular area inside it  Ad: No mass or tenderness.  Levators :Symmetrical. Normal tone. Non  tender.  BL: Non tender  Assessment:   Suburethral cyst that is too proximal to likely be a Brenton's duct cyst and too medial to be a Violeta duct cyst.  No connection to the urethra could be found suggest urethral diverticulum but in this location and the history of it having been present years ago would suggest that this was a diverticulum that no longer connects to the urethra because the ostium probably scarred closed.  That we aspirated bloody fluid and it felt like there was some solid component is more worrisome so I agree with  that this should be excised       Plan:    Excision as scheduled  Cytology pending

## 2019-05-02 ENCOUNTER — TELEPHONE (OUTPATIENT)
Dept: UROLOGY | Facility: CLINIC | Age: 49
End: 2019-05-02

## 2019-05-02 NOTE — TELEPHONE ENCOUNTER
Pt had cyst/lesion aspirated by . Came back bloody and felt something inside the cyst. He thinks that this may have been a urethral diverticulum and os closed. Cytology returned negative    Will f/u as scheduled on June 6 for uretheral diverticulectomy vs cyst excision. Should have a pre-op visit at the hospital  1 week prior  To June 6

## 2019-05-14 ENCOUNTER — TELEPHONE (OUTPATIENT)
Dept: FAMILY MEDICINE | Facility: CLINIC | Age: 49
End: 2019-05-14

## 2019-05-14 DIAGNOSIS — I10 BENIGN ESSENTIAL HTN: ICD-10-CM

## 2019-05-21 ENCOUNTER — TELEPHONE (OUTPATIENT)
Dept: UROGYNECOLOGY | Facility: CLINIC | Age: 49
End: 2019-05-21

## 2019-05-21 NOTE — TELEPHONE ENCOUNTER
----- Message from Viviane Slade sent at 5/21/2019  9:34 AM CDT -----  Type:  Test Results    Who Called:  Patient  Name of Test (Lab/Mammo/Etc):  Fluid from cyst sent off  Date of Test:  4/30/19  Ordering Provider:  Same  Where the test was performed:  Office  Best Call Back Number:  [phone]   Additional Information:  She's a  and cannot answer the phone after 1:30pm. Please leave message on phone what the results were.

## 2019-05-29 ENCOUNTER — CLINICAL SUPPORT (OUTPATIENT)
Dept: UROLOGY | Facility: CLINIC | Age: 49
End: 2019-05-29
Payer: COMMERCIAL

## 2019-05-29 ENCOUNTER — HOSPITAL ENCOUNTER (OUTPATIENT)
Dept: PREADMISSION TESTING | Facility: HOSPITAL | Age: 49
Discharge: HOME | End: 2019-05-29
Attending: UROLOGY
Payer: COMMERCIAL

## 2019-05-29 VITALS — BODY MASS INDEX: 35.85 KG/M2 | HEIGHT: 64 IN | WEIGHT: 210 LBS

## 2019-05-29 DIAGNOSIS — N89.8 VAGINAL LESION: ICD-10-CM

## 2019-05-29 DIAGNOSIS — R82.998 CELLS AND CASTS IN URINE: Primary | ICD-10-CM

## 2019-05-29 LAB
BILIRUB UR QL STRIP: NEGATIVE
CLARITY UR: CLEAR
COLOR UR: YELLOW
GLUCOSE UR QL STRIP: NEGATIVE
HGB UR QL STRIP: NEGATIVE
KETONES UR QL STRIP: NEGATIVE
LEUKOCYTE ESTERASE UR QL STRIP: NEGATIVE
NITRITE UR QL STRIP: NEGATIVE
PH UR STRIP: 5 [PH] (ref 5–8)
PROT UR QL STRIP: NEGATIVE
SP GR UR STRIP: 1.02 (ref 1–1.03)
URN SPEC COLLECT METH UR: NORMAL
UROBILINOGEN UR STRIP-ACNC: NEGATIVE EU/DL

## 2019-05-29 PROCEDURE — 99900103 DSU ONLY-NO CHARGE-INITIAL HR (STAT)

## 2019-05-29 PROCEDURE — 93010 ELECTROCARDIOGRAM REPORT: CPT | Mod: ,,, | Performed by: INTERNAL MEDICINE

## 2019-05-29 PROCEDURE — 99900104 DSU ONLY-NO CHARGE-EA ADD'L HR (STAT)

## 2019-05-29 PROCEDURE — 81003 URINALYSIS AUTO W/O SCOPE: CPT

## 2019-05-29 PROCEDURE — 99499 NO LOS: ICD-10-PCS | Mod: S$GLB,,, | Performed by: UROLOGY

## 2019-05-29 PROCEDURE — 93010 EKG 12-LEAD: ICD-10-PCS | Mod: ,,, | Performed by: INTERNAL MEDICINE

## 2019-05-29 PROCEDURE — 87086 URINE CULTURE/COLONY COUNT: CPT

## 2019-05-29 PROCEDURE — 99499 UNLISTED E&M SERVICE: CPT | Mod: S$GLB,,, | Performed by: UROLOGY

## 2019-05-29 PROCEDURE — 93005 ELECTROCARDIOGRAM TRACING: CPT

## 2019-05-29 RX ORDER — BUDESONIDE AND FORMOTEROL FUMARATE DIHYDRATE 160; 4.5 UG/1; UG/1
2 AEROSOL RESPIRATORY (INHALATION) EVERY 12 HOURS
COMMUNITY
End: 2022-11-02 | Stop reason: SDUPTHER

## 2019-05-29 NOTE — PROGRESS NOTES
Per  patient in clinic today to give a urine sample. Unsuccessful with catheterized urine specimen with 10F in and out catheter. Voided urine sent for u/a and urine culture.

## 2019-05-29 NOTE — DISCHARGE INSTRUCTIONS

## 2019-05-31 LAB — BACTERIA UR CULT: NORMAL

## 2019-06-05 ENCOUNTER — ANESTHESIA EVENT (OUTPATIENT)
Dept: SURGERY | Facility: HOSPITAL | Age: 49
End: 2019-06-05
Payer: COMMERCIAL

## 2019-06-06 ENCOUNTER — HOSPITAL ENCOUNTER (OUTPATIENT)
Facility: HOSPITAL | Age: 49
Discharge: HOME | End: 2019-06-06
Attending: UROLOGY | Admitting: UROLOGY
Payer: COMMERCIAL

## 2019-06-06 ENCOUNTER — TELEPHONE (OUTPATIENT)
Dept: UROLOGY | Facility: CLINIC | Age: 49
End: 2019-06-06

## 2019-06-06 ENCOUNTER — ANESTHESIA (OUTPATIENT)
Dept: SURGERY | Facility: HOSPITAL | Age: 49
End: 2019-06-06
Payer: COMMERCIAL

## 2019-06-06 DIAGNOSIS — N89.8 VAGINAL LESION: ICD-10-CM

## 2019-06-06 PROCEDURE — 88304 TISSUE EXAM BY PATHOLOGIST: CPT | Performed by: PATHOLOGY

## 2019-06-06 PROCEDURE — D9220A PRA ANESTHESIA: ICD-10-PCS | Mod: ,,, | Performed by: ANESTHESIOLOGY

## 2019-06-06 PROCEDURE — 57135 PR EXCIS VAGINAL CYST/TUMOR: ICD-10-PCS | Mod: ,,, | Performed by: UROLOGY

## 2019-06-06 PROCEDURE — 11200 RMVL SKIN TAGS UP TO&INC 15: CPT | Mod: 51,,, | Performed by: UROLOGY

## 2019-06-06 PROCEDURE — 63600175 PHARM REV CODE 636 W HCPCS: Performed by: UROLOGY

## 2019-06-06 PROCEDURE — 37000008 HC ANESTHESIA 1ST 15 MINUTES: Performed by: UROLOGY

## 2019-06-06 PROCEDURE — 52000 CYSTOURETHROSCOPY: CPT | Mod: 59,,, | Performed by: UROLOGY

## 2019-06-06 PROCEDURE — 63600175 PHARM REV CODE 636 W HCPCS: Performed by: NURSE ANESTHETIST, CERTIFIED REGISTERED

## 2019-06-06 PROCEDURE — 11200 PR REMOVAL OF SKIN TAGS, UP TO 15: ICD-10-PCS | Mod: 51,,, | Performed by: UROLOGY

## 2019-06-06 PROCEDURE — 36000706: Performed by: UROLOGY

## 2019-06-06 PROCEDURE — 57135 EXCISION VAGINAL CYST/TUMOR: CPT | Mod: ,,, | Performed by: UROLOGY

## 2019-06-06 PROCEDURE — 99900104 DSU ONLY-NO CHARGE-EA ADD'L HR (STAT): Performed by: UROLOGY

## 2019-06-06 PROCEDURE — 99900103 DSU ONLY-NO CHARGE-INITIAL HR (STAT): Performed by: UROLOGY

## 2019-06-06 PROCEDURE — 52000 PR CYSTOURETHROSCOPY: ICD-10-PCS | Mod: 59,,, | Performed by: UROLOGY

## 2019-06-06 PROCEDURE — 71000015 HC POSTOP RECOV 1ST HR: Performed by: UROLOGY

## 2019-06-06 PROCEDURE — 25000003 PHARM REV CODE 250: Performed by: NURSE ANESTHETIST, CERTIFIED REGISTERED

## 2019-06-06 PROCEDURE — 88304 TISSUE EXAM BY PATHOLOGIST: CPT | Mod: 26,,, | Performed by: PATHOLOGY

## 2019-06-06 PROCEDURE — 25000003 PHARM REV CODE 250: Performed by: ANESTHESIOLOGY

## 2019-06-06 PROCEDURE — 88304 TISSUE SPECIMEN TO PATHOLOGY - SURGERY: ICD-10-PCS | Mod: 26,,, | Performed by: PATHOLOGY

## 2019-06-06 PROCEDURE — 36000707: Performed by: UROLOGY

## 2019-06-06 PROCEDURE — 71000033 HC RECOVERY, INTIAL HOUR: Performed by: UROLOGY

## 2019-06-06 PROCEDURE — 36000709 HC OR TIME LEV III EA ADD 15 MIN: Performed by: UROLOGY

## 2019-06-06 PROCEDURE — 36000708 HC OR TIME LEV III 1ST 15 MIN: Performed by: UROLOGY

## 2019-06-06 PROCEDURE — 37000009 HC ANESTHESIA EA ADD 15 MINS: Performed by: UROLOGY

## 2019-06-06 PROCEDURE — 25000003 PHARM REV CODE 250: Performed by: UROLOGY

## 2019-06-06 PROCEDURE — D9220A PRA ANESTHESIA: Mod: ,,, | Performed by: ANESTHESIOLOGY

## 2019-06-06 RX ORDER — LIDOCAINE HYDROCHLORIDE 10 MG/ML
1 INJECTION, SOLUTION EPIDURAL; INFILTRATION; INTRACAUDAL; PERINEURAL ONCE
Status: DISCONTINUED | OUTPATIENT
Start: 2019-06-06 | End: 2019-06-06 | Stop reason: HOSPADM

## 2019-06-06 RX ORDER — SODIUM CHLORIDE, SODIUM LACTATE, POTASSIUM CHLORIDE, CALCIUM CHLORIDE 600; 310; 30; 20 MG/100ML; MG/100ML; MG/100ML; MG/100ML
INJECTION, SOLUTION INTRAVENOUS CONTINUOUS
Status: DISCONTINUED | OUTPATIENT
Start: 2019-06-06 | End: 2019-06-06 | Stop reason: HOSPADM

## 2019-06-06 RX ORDER — ONDANSETRON 2 MG/ML
INJECTION INTRAMUSCULAR; INTRAVENOUS
Status: DISCONTINUED | OUTPATIENT
Start: 2019-06-06 | End: 2019-06-06

## 2019-06-06 RX ORDER — GLYCOPYRROLATE 0.2 MG/ML
INJECTION INTRAMUSCULAR; INTRAVENOUS
Status: DISCONTINUED | OUTPATIENT
Start: 2019-06-06 | End: 2019-06-06

## 2019-06-06 RX ORDER — BUPIVACAINE HYDROCHLORIDE AND EPINEPHRINE 2.5; 5 MG/ML; UG/ML
INJECTION, SOLUTION EPIDURAL; INFILTRATION; INTRACAUDAL; PERINEURAL
Status: DISCONTINUED | OUTPATIENT
Start: 2019-06-06 | End: 2019-06-06 | Stop reason: HOSPADM

## 2019-06-06 RX ORDER — ACETAMINOPHEN 10 MG/ML
INJECTION, SOLUTION INTRAVENOUS
Status: DISCONTINUED | OUTPATIENT
Start: 2019-06-06 | End: 2019-06-06

## 2019-06-06 RX ORDER — PROPOFOL 10 MG/ML
VIAL (ML) INTRAVENOUS
Status: DISCONTINUED | OUTPATIENT
Start: 2019-06-06 | End: 2019-06-06

## 2019-06-06 RX ORDER — HYDROCODONE BITARTRATE AND ACETAMINOPHEN 5; 325 MG/1; MG/1
1 TABLET ORAL EVERY 6 HOURS PRN
Qty: 15 TABLET | Refills: 0 | Status: SHIPPED | OUTPATIENT
Start: 2019-06-06 | End: 2019-06-16

## 2019-06-06 RX ORDER — ROCURONIUM BROMIDE 10 MG/ML
INJECTION, SOLUTION INTRAVENOUS
Status: DISCONTINUED | OUTPATIENT
Start: 2019-06-06 | End: 2019-06-06

## 2019-06-06 RX ORDER — OXYCODONE HYDROCHLORIDE 5 MG/1
5 TABLET ORAL
Status: DISCONTINUED | OUTPATIENT
Start: 2019-06-06 | End: 2019-06-06 | Stop reason: HOSPADM

## 2019-06-06 RX ORDER — LIDOCAINE HCL/PF 100 MG/5ML
SYRINGE (ML) INTRAVENOUS
Status: DISCONTINUED | OUTPATIENT
Start: 2019-06-06 | End: 2019-06-06

## 2019-06-06 RX ORDER — DEXAMETHASONE SODIUM PHOSPHATE 4 MG/ML
INJECTION, SOLUTION INTRA-ARTICULAR; INTRALESIONAL; INTRAMUSCULAR; INTRAVENOUS; SOFT TISSUE
Status: DISCONTINUED | OUTPATIENT
Start: 2019-06-06 | End: 2019-06-06

## 2019-06-06 RX ORDER — FENTANYL CITRATE 50 UG/ML
INJECTION, SOLUTION INTRAMUSCULAR; INTRAVENOUS
Status: DISCONTINUED | OUTPATIENT
Start: 2019-06-06 | End: 2019-06-06

## 2019-06-06 RX ORDER — CIPROFLOXACIN 500 MG/1
500 TABLET ORAL 2 TIMES DAILY
Qty: 6 TABLET | Refills: 0 | Status: SHIPPED | OUTPATIENT
Start: 2019-06-06 | End: 2019-06-09

## 2019-06-06 RX ORDER — FENTANYL CITRATE 50 UG/ML
25 INJECTION, SOLUTION INTRAMUSCULAR; INTRAVENOUS EVERY 5 MIN PRN
Status: DISCONTINUED | OUTPATIENT
Start: 2019-06-06 | End: 2019-06-06 | Stop reason: HOSPADM

## 2019-06-06 RX ORDER — NEOSTIGMINE METHYLSULFATE 1 MG/ML
INJECTION, SOLUTION INTRAVENOUS
Status: DISCONTINUED | OUTPATIENT
Start: 2019-06-06 | End: 2019-06-06

## 2019-06-06 RX ORDER — MIDAZOLAM HYDROCHLORIDE 1 MG/ML
INJECTION, SOLUTION INTRAMUSCULAR; INTRAVENOUS
Status: DISCONTINUED | OUTPATIENT
Start: 2019-06-06 | End: 2019-06-06

## 2019-06-06 RX ADMIN — SODIUM CHLORIDE, SODIUM LACTATE, POTASSIUM CHLORIDE, AND CALCIUM CHLORIDE: .6; .31; .03; .02 INJECTION, SOLUTION INTRAVENOUS at 01:06

## 2019-06-06 RX ADMIN — DEXAMETHASONE SODIUM PHOSPHATE 8 MG: 4 INJECTION, SOLUTION INTRAMUSCULAR; INTRAVENOUS at 01:06

## 2019-06-06 RX ADMIN — FENTANYL CITRATE 100 MCG: 50 INJECTION, SOLUTION INTRAMUSCULAR; INTRAVENOUS at 01:06

## 2019-06-06 RX ADMIN — CEFTRIAXONE 1 G: 1 INJECTION, SOLUTION INTRAVENOUS at 01:06

## 2019-06-06 RX ADMIN — ROCURONIUM BROMIDE 40 MG: 10 INJECTION, SOLUTION INTRAVENOUS at 01:06

## 2019-06-06 RX ADMIN — MIDAZOLAM 2 MG: 1 INJECTION INTRAMUSCULAR; INTRAVENOUS at 01:06

## 2019-06-06 RX ADMIN — SODIUM CHLORIDE, SODIUM LACTATE, POTASSIUM CHLORIDE, AND CALCIUM CHLORIDE: .6; .31; .03; .02 INJECTION, SOLUTION INTRAVENOUS at 10:06

## 2019-06-06 RX ADMIN — NEOSTIGMINE METHYLSULFATE 5 MG: 1 INJECTION INTRAVENOUS at 01:06

## 2019-06-06 RX ADMIN — ONDANSETRON 4 MG: 2 INJECTION, SOLUTION INTRAMUSCULAR; INTRAVENOUS at 01:06

## 2019-06-06 RX ADMIN — LIDOCAINE HYDROCHLORIDE 80 MG: 20 INJECTION, SOLUTION INTRAVENOUS at 01:06

## 2019-06-06 RX ADMIN — PROPOFOL 50 MG: 10 INJECTION, EMULSION INTRAVENOUS at 01:06

## 2019-06-06 RX ADMIN — ACETAMINOPHEN 1000 MG: 10 INJECTION, SOLUTION INTRAVENOUS at 01:06

## 2019-06-06 RX ADMIN — PROPOFOL 150 MG: 10 INJECTION, EMULSION INTRAVENOUS at 01:06

## 2019-06-06 RX ADMIN — GLYCOPYRROLATE 0.4 MG: 0.2 INJECTION, SOLUTION INTRAMUSCULAR; INTRAVENOUS at 01:06

## 2019-06-06 NOTE — TRANSFER OF CARE
"Anesthesia Transfer of Care Note    Patient: Sondra Beltran    Procedure(s) Performed: Procedure(s) (LRB):  excision of vaginal lesion vs urethral diverticulectomy (N/A)    Patient location: PACU    Anesthesia Type: general    Transport from OR: Transported from OR on 2-3 L/min O2 by NC with adequate spontaneous ventilation    Post pain: adequate analgesia    Post assessment: no apparent anesthetic complications and tolerated procedure well    Post vital signs: stable    Level of consciousness: awake    Nausea/Vomiting: no nausea/vomiting    Complications: none    Transfer of care protocol was followed      Last vitals:   Visit Vitals  BP (!) 141/71 (BP Location: Left arm, Patient Position: Lying)   Pulse 69   Temp 36.7 °C (98.1 °F) (Skin)   Resp 16   Ht 5' 4" (1.626 m)   Wt 95.3 kg (210 lb)   SpO2 95%   Breastfeeding? No   BMI 36.05 kg/m²     "

## 2019-06-06 NOTE — H&P
"[facility] H&P Note - [place]  Staff: MD Reza     Referring provider and please cc:    [facility]     Chief Complaint: urethral diverticulum     Subjective:        HPI: Sondra Beltran is a 48 y.o. female presents with      She presented to her gynecologist with c/o cyst in the vagina that recurred after drainage in her 20s. Between her first and second visit the pt and  saw that was increasing in size over 3 months (size of grape). She had called me out of concern that this could be a urethral diverticulum and I recommended an mri pelvis. 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 I reviewed the images but the diverticulum is not clear. Not tender to touch. Pt states she has a bulge. Denies and dribbling, dysuria or discharge. Some dyspareunia. No h/o recurrent uti's.      She also has mixed incontinence with OAB. JESSI=UUI. She is . +hysterectomy. No pads needed. Voids up to 10x during day and 4-5x a night. Drinks coffee in am (2 cups) and 1 cup with tea with dinner occ. +constipation, has a bm every other day and takes daily stool softeners. Stress test on 3/15/19.     CVID treated by  with a shot every 3 weeks.   Started on ditropan for oab med   Cystoscopy on 19 showed vaginal lesion vs urethral diverticulum. Very likely vaginal cyst. Dr. Basurto saw her on 19 and pt stated she had a similar cyst years ago that was aspirated (fluid was yellow). 's exam noted that day  "Urethra: Non tender.  1.5 cm thin wall flaccid cyst on the left side of mid  Urethra.  It was aspirated with a 27 gauge needle and bloody fluid was obtained to be sent for cytology.  When it collapsed it did not feel completely empty and felt as if there was a nodular area inside it".  He felt that Suburethral cyst that is too proximal to likely be a Marty's duct cyst and too medial to be a Violeta duct cyst.  No " connection to the urethra could be found suggest urethral diverticulum but in this location and the history of it having been present years ago would suggest that this was a diverticulum that no longer connects to the urethra because the ostium probably scarred closed.  That we aspirated bloody fluid and it felt like there was some solid component is more worrisome so I agree with  that this should be excised     Here today for excision of suburethral mass and cystoscopy. She says she has had recurrence of the mass since then and it feels harder. She also has a new perineal mass/cyst that she wants removed if possible.     Urine history:   5/29/19 Ng, void: neg  4/30/19 Cytology from vaginal cyst:negative for malignancy  4/22/19 No cx, void: tr wbc   4/15/19 Mult org, void: neg  3/15/19 No cx, void: neg, pvr by I&o: 10cc          ECOG Status: 0     REVIEW OF SYSTEMS:  General ROS: no fevers, no chills  Psychological ROS: no depression  Endocrine ROS: no heat or cold  Respiratory ROS: no SOB  Cardiovascular ROS: no CP  Gastrointestinal ROS: no abdominal pain, no constipation, no diarrhea, no BRBPR  Musculoskeletal ROS: no muscle pain  Neurological ROS: no headaches  Dermatological ROS: no rashes  HEENT: +glasses, no sinus   ROS: per HPI     PMHx:       Past Medical History:   Diagnosis Date    Asthma      Benign essential HTN 6/19/2017    CVID (common variable immunodeficiency)      Neck strain 5/17/2016    Thyroid nodule 6/28/2017      Kidney stones: No     PSHx:        Past Surgical History:   Procedure Laterality Date    cervial cone surgery        HYSTERECTOMY        KNEE SURGERY        nose surgery          Urologic or Gynecologic Surgery: none     Stents/Valves/Foreign Bodies: none  Cardiologist: none  Gynecologist:   Immunogist:Dr.Querish Enciso Hx:   malignancies: father with bladder cancer 60s (smoker), gyn malignancies: No . Gm and aunt had colon cancer  kidney stones:  No      Soc Hx:  No tobacco.    No alcohol  Lives in [place]  :yes  Children: 2  Occupation:      Allergies:  Bactrim [sulfamethoxazole-trimethoprim] - breaks out     Home Medications: reviewed   Urologic Medications: none  Anticoagulation: no     Objective:      Vitals:    06/06/19 1012   BP: (!) 141/71   Pulse: 69   Resp: 16   Temp: 98.1 °F (36.7 °C)          General:WDWN in NAD  Eyes: PERRLA, normal conjunctiva  Respiratory: no increased work on breathing. No wheezing.   Cardiovascular: No obvious extremity edema. Warm and well perfused.   GI: no palpation of masses. No tenderness. No hepatosplenomegaly to palpation.  Musculoskeletal: normal range of motion of bilateral upper extremities. Normal muscle strength and tone.  Skin: no obvious rashes or lesions. No tightening of skin noted.  Neurologic: CN grossly normal. Normal sensation.   Psychiatric: awake, alert and oriented x 3. Mood and affect normal. Cooperative.    Pelvic exam  3/15/19  External Genitalia: normal hair distribution, no lesions  Urethral meatus: normal without prolapse or caruncle  Urethra: without tenderness or mass, just proximal to orifice she has a vaginal lesion c/w cyst vs urethral diverticulum. No discharge with palpation of lesion. Soft, fluctuant  Bladder: without fulness or tenderness  Vagina: normal appearing. No discharge or lesions. no prolapse.   Anus and perineum: appear normal  In and out cath performed with 20cc residual     Pelvic exam today (3/15/19):  negative stress test with coughing supine, negative stress test with valsalva supine  In and out cath performed with 20cc residual - urine sws not sent for sample  First sensation to void felt at 60 cc  Significant urge felt at 130cc  Bladder filled to 150 cc  The catheter was then removed  Negative stress test with coughing supine, negative  stress test with valsalva supine  Negative stress test with coughing or valsalva  Standing        LABS  REVIEW:    BMP  Lab Results   Component Value Date     05/29/2019    K 4.0 05/29/2019     05/29/2019    CO2 28 05/29/2019    BUN 8 05/29/2019    CREATININE 0.7 05/29/2019    CALCIUM 9.4 05/29/2019    ANIONGAP 8 05/29/2019    ESTGFRAFRICA >60 05/29/2019    EGFRNONAA >60 05/29/2019     Lab Results   Component Value Date    WBC 4.00 05/29/2019    HGB 13.5 05/29/2019    HCT 39.9 05/29/2019    MCV 90 05/29/2019     05/29/2019          PATHOLOGY REVIEW:  URINE CYTOLOGY 4/30/19:  NO HIGH GRADE UROTHELIAL NEOPLASIA IDENTIFIED ([facility] system)    RADIOGRAPHIC REVIEW:  Mri pelvis 2/14/19  Simple fluid enhancing cystic lesion extending from the psoterior midline of the urethra measuring 7x4mm  Most c/w a urethral diverticulum at the orifice      Assessment:       1. Urethral diverticulum    2. Mixed incontinence    3. OAB (overactive bladder)    4. Vaginal lesion           Plan:      Urethral diverticulum vs vaginal lesion  -not convinced entirely this is a diverticulum. Abnormal location near the orifice. Mri did not clearly show connection to urethra and when palpated no leakage or discharge. It is fluctuant and no concern for malignancy.   -consented today for cystoscopy and excision of suburethral mass. Understands this could be urehtral diveriticulum and if so risk of incontinence, recurrence, fistula, uti, bleeding and need for long term schumacher. Could find malignancy, less likely. Risk of damage to bladder as well.  -also consented for perineal mass excision if deemed necessary.    I have explained the risks, risks, benefits, and alternatives of the procedure in detail.  The patient voices understanding and all questions have been answered.  The patient agrees to proceed as planned.         URGE incontinence-  Continue ditropan 10mg XL qday   Pick a date on a Thursday June 6, June 7. Will book out once I do cysto and confirm with her.    This patient has been cleared for surgery in ambulatory surgical  facility.     Sondra Cobb

## 2019-06-06 NOTE — DISCHARGE SUMMARY
[facility]  Urology  Discharge Note - Short Stay      Patient Name: Sondra Beltran  MRN: [id number]  Discharge Date and Time:  06/06/2019 2:04 PM  Attending Physician: Sondra Cobb,*   Discharging Provider: Sondra Cobb MD  Primary Care Physician: Primary Doctor No    Final Active Diagnoses:    Diagnosis Date Noted POA    Vaginal lesion [N89.8] 06/06/2019 Yes      Problems Resolved During this Admission:       Final Diagnoses: Same as principal problem.    Hospital Course: Patient was admitted for an outpatient procedure and tolerated the procedure well with no complications.*    Procedure(s) (LRB):  excision of vaginal lesion vs urethral diverticulectomy (N/A)     Indwelling Lines/Drains at time of discharge:   Lines/Drains/Airways          None          Discharged Condition: good    Disposition: home    Follow Up:      Patient Instructions:   No discharge procedures on file.    Medications:  Reconciled Home Medications:      Medication List      START taking these medications    ciprofloxacin HCl 500 MG tablet  Commonly known as:  CIPRO  Take 1 tablet (500 mg total) by mouth 2 (two) times daily. for 3 days     HYDROcodone-acetaminophen 5-325 mg per tablet  Commonly known as:  NORCO  Take 1 tablet by mouth every 6 (six) hours as needed for Pain.        CONTINUE taking these medications    azelastine 137 mcg (0.1 %) nasal spray  Commonly known as:  ASTELIN  2 sprays (274 mcg total) by Nasal route 2 (two) times daily.     cetirizine-pseudoephedrine 5-120 mg Tb12  Take 1 tablet by mouth.     cyanocobalamin 1,000 mcg/mL injection  INJECT 1 ML (1,000 MCG TOTAL) INTO THE SKIN EVERY 7 DAYS.     GAMUNEX IV  Inject into the vein every 21 days.     levocetirizine 5 MG tablet  Commonly known as:  XYZAL  Take 5 mg by mouth every evening.     lisinopril 5 MG tablet  Commonly known as:  PRINIVIL,ZESTRIL  Take 1 tablet (5 mg total) by mouth once daily.     montelukast 10 mg  tablet  Commonly known as:  SINGULAIR  Take 1 tablet (10 mg total) by mouth 2 (two) times daily.     PROAIR HFA 90 mcg/actuation inhaler  Generic drug:  albuterol  Inhale 2 puffs into the lungs every 4 (four) hours as needed for Wheezing.     SPIRIVA RESPIMAT 1.25 mcg/actuation Mist  Generic drug:  tiotropium bromide  Inhale 2.5 mcg into the lungs 2 (two) times daily. Controller     SYMBICORT 160-4.5 mcg/actuation Hfaa  Generic drug:  budesonide-formoterol 160-4.5 mcg  Inhale 2 puffs into the lungs every 12 (twelve) hours. Controller     VITAMIN C 500 MG tablet  Generic drug:  ascorbic acid (vitamin C)  Take 500 mg by mouth 3 (three) times daily.     vitamin D 1000 units Tab  Commonly known as:  VITAMIN D3  Take 185 mg by mouth once daily.            Discharge Procedure Orders (must include Diet, Follow-up, Activity):  No discharge procedures on file.         Sondra Cobb MD  Urology  [facility]

## 2019-06-06 NOTE — OP NOTE
[facility]  Operative Note    Date: 06/06/2019    Pre-Op Diagnosis: vaginal lesion    Post-Op Diagnosis: same    Procedure(s) Performed:   1.  Excision of vaginal lesion x 2   2.  Cystoscopy     Specimen(s):  Midline vaginal cyst/mass  Vaginal epithelial tag    Staff Surgeon: Sondra Cobb MD    Co- Surgeon: Yaw Basurto MD    Anesthesia: General endotracheal anesthesia    Indications: Sondra Beltran is a 49 y.o. female with vaginal lesion, not in typical location for common vaginal cysts.     Findings:   Midline cystic fluid collection (bluish) about 2.5 x 1.5cm suburethral. Did not appear to connect with the urethra. Still possibly a previous urethral diverticulum that closed off since not c/w location for Garters, Bartholin or skene's gland. Cystoscopy revealed no significant abnormalities and urethra normal. The perineal mass she felt may have just been a hemhorroid or vaginal tag. Vaginal tag removed. She also had a small pocket in the vagina, thought to be from birth trauma but her family said that she had this before the birth of her first child noted by the gynecologist.       Estimated Blood Loss: 50cc    Drains: none    Procedure in detail:  After the risks and benefits of the procedure were explained, consent was obtained. Her urinalysis was confirmed to be negative. The patient was then taken to the operating room and placed under general anesthesia. Pre-operative antibiotics were administered. She was placed in a dorsal lithotomy position and prepped and draped in a normal and sterile fashion.     A 16 Macedonian schumacher catheter was placed in a sterile fashion on the field and 10cc of water was placed in the balloon. The mass/cyst was identified and an allis clamp was placed on the vaginal epithelium just distal to this.  The underlying vaginal epithelium was infiltrated with 1% lidocaine. A 0.5 cm  incision was made on the vaginal epithelium using a 15 blade just distal  to the cyst. The cyst was peeled off the vagina. It was pretty adherent to the vagina. The mass/cyst (fluid filled) was kept intact and sent for specimen. Hemostasis was obtained with a figure of 8 using 3-0 vicryl. After irrigating the vaginal incision, the vaginal incision was closed  in a running fashion using 3-0 vicryl.  A sponge stick was used to apply pressure for hemostasis for a few minutes.     A separate vaginal tag was removed using mets, sent for specimen. Base was cauterized with bovie.     A 17 Fr cystoscope with a 30 degree lens was then used to evaluate the urethra and bladder. It was confirmed there was no perforation of the urethra or bladder. efflux could be seen exiting both ureteral orifices bilaterally. The cystoscope was removed and the schumacher was then replaced.    The vagina was irrigated again.     Plan:  Pt must void prior to discharge  Check bladder scan  If > 200 let me know  norco 5 prn pain, disp 15  cipro 500 bid x 3d      Sondra Cobb MD

## 2019-06-06 NOTE — OR NURSING
Patient has bruised line on Right thigh. OR nurse reports that this is where the bovie pad was placed.

## 2019-06-06 NOTE — PLAN OF CARE
Discharge instructions given to pt and spouse who voice understanding.  Taking po fluids without nausea.  Denies pain.  Voiding without difficulty with small amount bleeding noted

## 2019-06-06 NOTE — ANESTHESIA PREPROCEDURE EVALUATION
06/06/2019  Sondra Beltran is a 49 y.o., female.    Anesthesia Evaluation    I have reviewed the Patient Summary Reports.    I have reviewed the Nursing Notes.   I have reviewed the Medications.     Review of Systems  Anesthesia Hx:  No problems with previous Anesthesia    Social:  Non-Smoker    Hematology/Oncology:  Hematology Normal   Oncology Normal     EENT/Dental:EENT/Dental Normal   Cardiovascular:   Hypertension    Pulmonary:   Asthma    Renal/:  Renal/ Normal     Hepatic/GI:  Hepatic/GI Normal    Musculoskeletal:  Musculoskeletal Normal    Neurological:   Neuromuscular Disease,    Endocrine:  Endocrine Normal    Dermatological:  Skin Normal    Psych:  Psychiatric Normal           Physical Exam  General:  Obesity    Airway/Jaw/Neck:  Airway Findings: Mouth Opening: Normal Tongue: Normal  General Airway Assessment: Adult  Mallampati: II  Improves to I with phonation.        Eyes/Ears/Nose:  EYES/EARS/NOSE FINDINGS: Normal   Dental:  DENTAL FINDINGS: Normal   Chest/Lungs:  Chest/Lungs Findings: Clear to auscultation, Normal Respiratory Rate     Heart/Vascular:  Heart Findings: Rate: Normal  Rhythm: Regular Rhythm        Mental Status:  Mental Status Findings:  Cooperative, Alert and Oriented         Anesthesia Plan  Type of Anesthesia, risks & benefits discussed:  Anesthesia Type:  general  Patient's Preference:   Intra-op Monitoring Plan: standard ASA monitors  Intra-op Monitoring Plan Comments:   Post Op Pain Control Plan: IV/PO Opioids PRN  Post Op Pain Control Plan Comments:   Induction:   IV  Beta Blocker:  Patient is not currently on a Beta-Blocker (No further documentation required).       Informed Consent: Patient understands risks and agrees with Anesthesia plan.  Questions answered. Anesthesia consent signed with patient.  ASA Score: 2     Day of Surgery Review of History & Physical:  I have interviewed and examined the patient. I have reviewed the patient's H&P dated:  There are no significant changes.  H&P update referred to the surgeon.         Ready For Surgery From Anesthesia Perspective.

## 2019-06-06 NOTE — DISCHARGE INSTRUCTIONS
"Discharge Instructions: After Your Surgery/Procedure  Youve just had surgery. During surgery you were given medicine called anesthesia to keep you relaxed and free of pain. After surgery you may have some pain or nausea. This is common. Here are some tips for feeling better and getting well after surgery.     Stay on schedule with your medication.   Going home  Your doctor or nurse will show you how to take care of yourself when you go home. He or she will also answer your questions. Have an adult family member or friend drive you home.      For your safety we recommend these precaution for the first 24 hours after your procedure:  · Do not drive or use heavy equipment.  · Do not make important decisions or sign legal papers.  · Do not drink alcohol.  · Have someone stay with you, if needed. He or she can watch for problems and help keep you safe.  · Your concentration, balance, coordination, and judgement may be impaired for many hours after anesthesia.  Use caution when ambulating or standing up.     · You may feel weak and "washed out" after anesthesia and surgery.      Subtle residual effects of general anesthesia or sedation with regional / local anesthesia can last more than 24 hours.  Rest for the remainder of the day or longer if your Doctor/Surgeon has advised you to do so.  Although you may feel normal within the first 24 hours, your reflexes and mental ability may be impaired without you realizing it.  You may feel dizzy, lightheaded or sleepy for 24 hours or longer.      Be sure to go to all follow-up visits with your doctor. And rest after your surgery for as long as your doctor tells you to.  Coping with pain  If you have pain after surgery, pain medicine will help you feel better. Take it as told, before pain becomes severe. Also, ask your doctor or pharmacist about other ways to control pain. This might be with heat, ice, or relaxation. And follow any other instructions your surgeon or nurse gives " you.  Tips for taking pain medicine  To get the best relief possible, remember these points:  · Pain medicines can upset your stomach. Taking them with a little food may help.  · Most pain relievers taken by mouth need at least 20 to 30 minutes to start to work.  · Taking medicine on a schedule can help you remember to take it. Try to time your medicine so that you can take it before starting an activity. This might be before you get dressed, go for a walk, or sit down for dinner.  · Constipation is a common side effect of pain medicines. Call your doctor before taking any medicines such as laxatives or stool softeners to help ease constipation. Also ask if you should skip any foods. Drinking lots of fluids and eating foods such as fruits and vegetables that are high in fiber can also help. Remember, do not take laxatives unless your surgeon has prescribed them.  · Drinking alcohol and taking pain medicine can cause dizziness and slow your breathing. It can even be deadly. Do not drink alcohol while taking pain medicine.  · Pain medicine can make you react more slowly to things. Do not drive or run machinery while taking pain medicine.  Your health care provider may tell you to take acetaminophen to help ease your pain. Ask him or her how much you are supposed to take each day. Acetaminophen or other pain relievers may interact with your prescription medicines or other over-the-counter (OTC) drugs. Some prescription medicines have acetaminophen and other ingredients. Using both prescription and OTC acetaminophen for pain can cause you to overdose. Read the labels on your OTC medicines with care. This will help you to clearly know the list of ingredients, how much to take, and any warnings. It may also help you not take too much acetaminophen. If you have questions or do not understand the information, ask your pharmacist or health care provider to explain it to you before you take the OTC medicine.  Managing  nausea  Some people have an upset stomach after surgery. This is often because of anesthesia, pain, or pain medicine, or the stress of surgery. These tips will help you handle nausea and eat healthy foods as you get better. If you were on a special food plan before surgery, ask your doctor if you should follow it while you get better. These tips may help:  · Do not push yourself to eat. Your body will tell you when to eat and how much.  · Start off with clear liquids and soup. They are easier to digest.  · Next try semi-solid foods, such as mashed potatoes, applesauce, and gelatin, as you feel ready.  · Slowly move to solid foods. Dont eat fatty, rich, or spicy foods at first.  · Do not force yourself to have 3 large meals a day. Instead eat smaller amounts more often.  · Take pain medicines with a small amount of solid food, such as crackers or toast, to avoid nausea.     Call your surgeon if  · You still have pain an hour after taking medicine. The medicine may not be strong enough.  · You feel too sleepy, dizzy, or groggy. The medicine may be too strong.  · You have side effects like nausea, vomiting, or skin changes, such as rash, itching, or hives.       If you have obstructive sleep apnea  You were given anesthesia medicine during surgery to keep you comfortable and free of pain. After surgery, you may have more apnea spells because of this medicine and other medicines you were given. The spells may last longer than usual.   At home:  · Keep using the continuous positive airway pressure (CPAP) device when you sleep. Unless your health care provider tells you not to, use it when you sleep, day or night. CPAP is a common device used to treat obstructive sleep apnea.  · Talk with your provider before taking any pain medicine, muscle relaxants, or sedatives. Your provider will tell you about the possible dangers of taking these medicines.  © 0671-5619 [employer]. [address] [place] [address]  [place] [address]. All rights reserved. This information is not intended as a substitute for professional medical care. Always follow your healthcare professional's instructions.          General Information:    1.  Do not drink alcoholic beverages including beer for 24 hours or as long as you are on pain medication..  2.  Do not drive a motor vehicle, operate machinery or power tools, or signs legal papers for 24 hours or as long as you are on pain medication.   3.  You may experience light-headedness, dizziness, and sleepiness following surgery. Please do not stay alone. A responsible adult should be with you for this 24 hour period.  4.  Go home and rest.    5. Progress slowly to a normal diet unless instructed.  Otherwise, begin with liquids such as soft drinks, then soup and crackers working up to solid foods. Drink plenty of nonalcoholic fluids.  6.  Certain anesthetics and pain medications produce nausea and vomiting in certain       individuals. If nausea becomes a problem at home, call you doctor.    7. A nurse will be calling you sometime after surgery. Do not be alarmed. This is our way of finding out how you are doing.    8. Several times every hour while you are awake, take 2-3 deep breaths and cough. If you had stomach surgery hold a pillow or rolled towel firmly against your stomach before you cough. This will help with any pain the cough might cause.  9. Several times every hour while you are awake, pump and flex your feet 5-6 times and do foot circles. This will help prevent blood clots.    10.Call your doctor for severe pain, bleeding, fever, or signs or symptoms of infection (pain, swelling, redness, foul odor, drainage).        Post op instructions for prevention of DVT  What is deep vein thrombosis?  Deep vein thrombosis (DVT) is the medical term for blood clots in the deep veins of the leg.  These blood clots can be dangerous.  A DVT can block a blood vessel and keep blood from getting where it needs to  go.  Another problem is that the clot can travel to other parts of the body such as the lungs.  A clot that travels to the lungs is called a pulmonary embolus (PE) and can cause serious problems with breathing which can lead to death.  Am I at risk for DVT/PE?  If you are not very active, you are at risk of DVT.  Anyone confined to bed, sitting for long periods of time, recovering from surgery, etc. increases the risk of DVT.  Other risk factors are cancer diagnosis, certain medications, estrogen replacement in any form,older age, obesity, pregnancy, smoking, history of clotting disorders, and dehydration.  How will I know if I have a DVT?   Swelling in the lower leg   Pain   Warmth, redness, hardness or bulging of the vein  If you have any of these symptoms, call your doctors office right away.  Some people will not have any symptoms until the clot moves to the lungs.  What are the symptoms of a PE?   Panting, shortness of breath, or trouble breathing   Sharp, knife-like chest pain when you breathe   Coughing or coughing up blood   Rapid heartbeat  If you have any of these symptoms or get worse quickly, call 911 for emergency treatment.  How can I prevent a DVT?   Avoid long periods of inactivity and dont cross your legs--get up and walk around every hour or so.   Stay active--walking after surgery is highly encouraged.  This means you should get out of the house and walk in the neighborhood.  Going up and down stairs will not impair healing (unless advised against such activity by your doctor).     Drink plenty of noncaffeinated, nonalcoholic fluids each day to prevent dehydration.   Wear special support stockings, if they have been advised by your doctor.   If you travel, stop at least once an hour and walk around.   Avoid smoking (assistance with stopping is available through your healthcare provider)  Always notify your doctor if you are not able to follow the post operative instructions that are  given to you at the time of discharge.  It may be necessary to prescribe one of the medications available to prevent DVT.

## 2019-06-07 VITALS
TEMPERATURE: 98 F | HEART RATE: 68 BPM | BODY MASS INDEX: 35.85 KG/M2 | SYSTOLIC BLOOD PRESSURE: 139 MMHG | HEIGHT: 64 IN | WEIGHT: 210 LBS | DIASTOLIC BLOOD PRESSURE: 84 MMHG | OXYGEN SATURATION: 97 % | RESPIRATION RATE: 16 BRPM

## 2019-06-07 NOTE — ANESTHESIA POSTPROCEDURE EVALUATION
Anesthesia Post Evaluation    Patient: Sondra Beltran    Procedure(s) Performed: Procedure(s) (LRB):  excision of vaginal lesion vs urethral diverticulectomy (N/A)    Final Anesthesia Type: general  Patient location during evaluation: PACU  Patient participation: Yes- Able to Participate  Level of consciousness: awake and alert  Post-procedure vital signs: reviewed and stable  Pain management: adequate  Airway patency: patent  PONV status at discharge: No PONV  Anesthetic complications: no      Cardiovascular status: hemodynamically stable  Respiratory status: unassisted and room air  Hydration status: euvolemic  Follow-up not needed.          Vitals Value Taken Time   /77 6/6/2019  2:54 PM   Temp 36.5 °C (97.7 °F) 6/6/2019  2:45 PM   Pulse 60 6/6/2019  2:58 PM   Resp 14 6/6/2019  2:58 PM   SpO2 93 % 6/6/2019  2:58 PM   Vitals shown include unvalidated device data.      Event Time     Out of Recovery 15:00:00          Pain/Flores Score: Flores Score: 10 (6/6/2019  3:30 PM)

## 2019-06-12 ENCOUNTER — TELEPHONE (OUTPATIENT)
Dept: UROLOGY | Facility: CLINIC | Age: 49
End: 2019-06-12

## 2019-06-12 NOTE — TELEPHONE ENCOUNTER
----- Message from Eleonora Evans sent at 6/12/2019  9:09 AM CDT -----  Contact: Py  Type: Needs Medical Advice    Who Called:  PT    Best Call Back Number: [phone]  Additional Information: Patient states that she is returning a call to Maureen.

## 2019-06-19 ENCOUNTER — TELEPHONE (OUTPATIENT)
Dept: PULMONOLOGY | Facility: CLINIC | Age: 49
End: 2019-06-19

## 2019-06-19 NOTE — TELEPHONE ENCOUNTER
Advised pt that a mass text is sent out and the first pt to respond gets the message. Pt verbalized understanding.     ----- Message from Kar Lambert sent at 6/19/2019  8:31 AM CDT -----  Contact: pt  Type: Needs Medical Advice    Who Called:  pt    Best Call Back Number: [phone]  Additional Information: pt received a text about an earlier appointment. I tried to schedule, but there were no earlier appointments available from what I could see. Just wanted to make sure. Please call to advise.

## 2019-07-02 ENCOUNTER — OFFICE VISIT (OUTPATIENT)
Dept: UROLOGY | Facility: CLINIC | Age: 49
End: 2019-07-02
Payer: COMMERCIAL

## 2019-07-02 VITALS
DIASTOLIC BLOOD PRESSURE: 82 MMHG | SYSTOLIC BLOOD PRESSURE: 127 MMHG | WEIGHT: 204.5 LBS | HEIGHT: 64 IN | BODY MASS INDEX: 34.91 KG/M2 | HEART RATE: 72 BPM

## 2019-07-02 DIAGNOSIS — N39.3 SUI (STRESS URINARY INCONTINENCE, FEMALE): ICD-10-CM

## 2019-07-02 DIAGNOSIS — N89.8 VAGINAL WALL CYST: Primary | ICD-10-CM

## 2019-07-02 DIAGNOSIS — N32.81 OVERACTIVE BLADDER: ICD-10-CM

## 2019-07-02 LAB
BILIRUB SERPL-MCNC: NORMAL MG/DL
BLOOD URINE, POC: NORMAL
COLOR, POC UA: YELLOW
GLUCOSE UR QL STRIP: NORMAL
KETONES UR QL STRIP: NORMAL
LEUKOCYTE ESTERASE URINE, POC: NORMAL
NITRITE, POC UA: NORMAL
PH, POC UA: 7
PROTEIN, POC: NORMAL
SPECIFIC GRAVITY, POC UA: 1.02
UROBILINOGEN, POC UA: NORMAL

## 2019-07-02 PROCEDURE — 81002 URINALYSIS NONAUTO W/O SCOPE: CPT | Mod: S$GLB,,, | Performed by: UROLOGY

## 2019-07-02 PROCEDURE — 3008F PR BODY MASS INDEX (BMI) DOCUMENTED: ICD-10-PCS | Mod: S$GLB,,, | Performed by: UROLOGY

## 2019-07-02 PROCEDURE — 3008F BODY MASS INDEX DOCD: CPT | Mod: S$GLB,,, | Performed by: UROLOGY

## 2019-07-02 PROCEDURE — 99215 PR OFFICE/OUTPT VISIT, EST, LEVL V, 40-54 MIN: ICD-10-PCS | Mod: 25,S$GLB,, | Performed by: UROLOGY

## 2019-07-02 PROCEDURE — 99999 PR PBB SHADOW E&M-EST. PATIENT-LVL III: CPT | Mod: PBBFAC,,, | Performed by: UROLOGY

## 2019-07-02 PROCEDURE — 81002 POCT URINE DIPSTICK WITHOUT MICROSCOPE: ICD-10-PCS | Mod: S$GLB,,, | Performed by: UROLOGY

## 2019-07-02 PROCEDURE — 99215 OFFICE O/P EST HI 40 MIN: CPT | Mod: 25,S$GLB,, | Performed by: UROLOGY

## 2019-07-02 PROCEDURE — 99999 PR PBB SHADOW E&M-EST. PATIENT-LVL III: ICD-10-PCS | Mod: PBBFAC,,, | Performed by: UROLOGY

## 2019-07-02 RX ORDER — SOLIFENACIN SUCCINATE 10 MG/1
10 TABLET, FILM COATED ORAL DAILY
Qty: 90 TABLET | Refills: 3 | Status: SHIPPED | OUTPATIENT
Start: 2019-07-02 | End: 2019-08-02 | Stop reason: ALTCHOICE

## 2019-07-02 NOTE — PATIENT INSTRUCTIONS
She was found have a vaginal wall cyst.  Dr. thierry hurt and I both agree that she may have had a urethral diverticulum previously that had closed off when she was younger.  The entire wall was removed and should not have any reaccumulation of fluid.  Today on exam 1 month later does not demonstrate any fluid collection or mass.   However will continue to examine at follow-up.    Will start her on VESIcare 10 mg once daily for her overactive bladder.  She has already tried Ditropan.  If she does hold her urine for more than 2 hr she will be at higher chance of leaking with coughing.  The VESIcare should hopefully help with the urgency and the frequent voiding.  However she will need to continue to make sure she urinates at least every 2 hr and do Kegel's so that her stress incontinence does not result in leakage.     If VESIcare isn't covered she will call the insurance company to see what overactive bladder medicines are covered.    Follow-up in 3 months to see how she is doing on VESIcare and do another vaginal exam.    Route   Route

## 2019-07-02 NOTE — PROGRESS NOTES
"[facility] Note - [place]  Staff: MD Reza    Referring provider and please cc:    [facility]    Chief Complaint: urethral diverticulum    Subjective:        HPI: Snodra Beltran is a 49 y.o. female presents with     She presented to her gynecologist with c/o cyst in the vagina that recurred after drainage in her 20s. Between her first and second visit the pt and  saw that was increasing in size over 3 months (size of grape). She had called me out of concern that this could be a urethral diverticulum and I recommended an mri pelvis. 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 I reviewed the images but the diverticulum is not clear. Not tender to touch. Pt states she has a bulge. Denies and dribbling, dysuria or discharge. Some dyspareunia. No h/o recurrent uti's.     Cystoscopy on 4/22/19 showed vaginal lesion vs urethral diverticulum. Very likely vaginal cyst. Dr. Basurto saw her on 5/20/19 and pt stated she had a similar cyst years ago that was aspirated (fluid was yellow). 's exam noted that day  "Urethra: Non tender.  1.5 cm thin wall flaccid cyst on the left side of mid  Urethra.  It was aspirated with a 27 gauge needle and bloody fluid was obtained to be sent for cytology.  When it collapsed it did not feel completely empty and felt as if there was a nodular area inside it".  He felt that Suburethral cyst that is too proximal to likely be a Sherrill's duct cyst and too medial to be a Violeta duct cyst.  No connection to the urethra could be found suggest urethral diverticulum but in this location and the history of it having been present years ago would suggest that this was a diverticulum that no longer connects to the urethra because the ostium probably scarred closed.  That we aspirated bloody fluid and it felt like there was some solid component is more worrisome so I agree with  that this " should be excised     Underwent  excision of suburethral mass, excision of vaginal epithelial tab and cystoscopy on 06/06/2019. Midline cystic fluid collection (bluish) about 2.5 x 1.5cm suburethral. Did not appear to connect with the urethra. Still possibly a previous urethral diverticulum that closed off since not c/w location for Garters, Bartholin or skene's gland. Cystoscopy revealed no significant abnormalities and urethra normal. The perineal mass she felt may have just been a hemhorroid or vaginal tag. Vaginal tag removed. She also had a small pocket in the vagina, thought to be from birth trauma but her family said that she had this before the birth of her first child noted by the gynecologist.  Pathology was negative for malignancy, showed a cyst.    Since it was removed a month ago she has had no bleeding, no issues no reaccumulation.  She continues to have overactive bladder with voiding Q 1-2 hours with urgency.  She also has stress urinary incontinence with coughing but denies urge urinary incontinence.  She previously tried Ditropan and said that this actually made her symptoms worse.  Stress test on 03/15 was negative.    ua void: neg  Urine history:   5/29/19            Ng, void: neg  4/30/19            Cytology from vaginal cyst:negative for malignancy  4/22/19            No cx, void: tr wbc   4/15/19            Mult org, void: neg  3/15/19            No cx, void: neg, pvr by I&o: 10cc    REVIEW OF SYSTEMS:  General ROS: no fevers, no chills  Psychological ROS: no depression  Endocrine ROS: no heat or cold  Respiratory ROS: no SOB  Cardiovascular ROS: no CP  Gastrointestinal ROS: no abdominal pain, no constipation, no diarrhea, no BRBPR  Musculoskeletal ROS: no muscle pain  Neurological ROS: no headaches  Dermatological ROS: no rashes  HEENT: +glasses, no sinus   ROS: per HPI     PMHx:  Past Medical History:   Diagnosis Date    Asthma     Benign essential HTN 6/19/2017    CVID (common variable  immunodeficiency)     Neck strain 5/17/2016    Thyroid nodule 6/28/2017     Kidney stones: No    PSHx:  Past Surgical History:   Procedure Laterality Date    cervial cone surgery      CYSTOSCOPY  6/6/2019    [facility]    CYSTOSCOPY N/A 4/22/2019    [facility]    EXCISION, LESION, VAGINA N/A 6/6/2019    [facility]    HYSTERECTOMY      KNEE SURGERY      nose surgery      VAGINOSCOPY N/A 4/22/2019    [facility]     Urologic or Gynecologic Surgery: none    Stents/Valves/Foreign Bodies: none  Cardiologist: none  Gynecologist:   Immunogist:Dr.Querish Enciso Hx:   malignancies: father with bladder cancer 60s (smoker), gyn malignancies: No . Gm and aunt had colon cancer  kidney stones: No     Soc Hx:  No tobacco.    No alcohol  Lives in [place]  :yes  Children: 2  Occupation:     Allergies:  Bactrim [sulfamethoxazole-trimethoprim] - breaks out    Home Medications: reviewed   Urologic Medications: none  Anticoagulation: no    Objective:     Vitals:    07/02/19 1519   BP: 127/82   Pulse: 72       General:WDWN in NAD  Eyes: PERRLA, normal conjunctiva  Respiratory: no increased work on breathing. No wheezing.   Cardiovascular: No obvious extremity edema. Warm and well perfused.   GI: no palpation of masses. No tenderness. No hepatosplenomegaly to palpation.  Musculoskeletal: normal range of motion of bilateral upper extremities. Normal muscle strength and tone.  Skin: no obvious rashes or lesions. No tightening of skin noted.  Neurologic: CN grossly normal. Normal sensation.   Psychiatric: awake, alert and oriented x 3. Mood and affect normal. Cooperative.    Pelvic exam  External Genitalia: normal hair distribution, no lesions  Urethral meatus: normal without prolapse or caruncle  Urethra: without tenderness or mass, just  proximal to orifice she has a vaginal lesion c/w cyst vs urethral diverticulum. No discharge with palpation of lesion. Soft, fluctuant  Bladder: without fulness or tenderness  Vagina: normal appearing. No discharge or lesions. no prolapse.   Anus and perineum: appear normal  In and out cath performed with 20cc residual    Pelvic exam today (3/15/19):  negative stress test with coughing supine, negative stress test with valsalva supine  In and out cath performed with 20cc residual - urine sws not sent for sample  First sensation to void felt at 60 cc  Significant urge felt at 130cc  Bladder filled to 150 cc  The catheter was then removed  Negative stress test with coughing supine, negative  stress test with valsalva supine  Negative stress test with coughing or valsalva  Standing    Exam 07/02/2019  No recurrence of any vaginal lesion  Sutures present  Vaginal wall healed well        LABS REVIEW:      Lab Results   Component Value Date    WBC 4.00 05/29/2019    HGB 13.5 05/29/2019    HCT 39.9 05/29/2019    MCV 90 05/29/2019     05/29/2019     BMP  Lab Results   Component Value Date     05/29/2019    K 4.0 05/29/2019     05/29/2019    CO2 28 05/29/2019    BUN 8 05/29/2019    CREATININE 0.7 05/29/2019    CALCIUM 9.4 05/29/2019    ANIONGAP 8 05/29/2019    ESTGFRAFRICA >60 05/29/2019    EGFRNONAA >60 05/29/2019         PATHOLOGY REVIEW:  FINAL PATHOLOGIC DIAGNOSIS 6/6/19  1. Suburethral cyst, excision:  Squamous epithelium with underlying benign cuboidal and squamous epithelium lined cyst.  No evidence of dysplasia or malignancy.  2. Vaginal nodule, biopsy:  Benign squamous epithelium with no evidence of dysplasia or malignancy consistent with benign fibroepithelial  polyp.    URINE CYTOLOGY 4/30/19 drained from cyst drainage by   NO HIGH GRADE UROTHELIAL NEOPLASIA IDENTIFIED ([facility])      RADIOGRAPHIC REVIEW:  Mri pelvis 2/14/19  Simple fluid enhancing cystic lesion extending from the  psoterior midline of the urethra measuring 7x4mm  Most c/w a urethral diverticulum at the orifice     Assessment:       1. Vaginal wall cyst    2. Overactive bladder    3. JESSI (stress urinary incontinence, female)          Plan:     She was found have a vaginal wall cyst.  Dr. thierry hurt and I both agree that she may have had a urethral diverticulum previously that had closed off when she was younger.  The entire wall was removed and should not have any reaccumulation of fluid.  Today on exam 1 month later does not demonstrate any fluid collection or mass.   However will continue to examine at follow-up.    Will start her on VESIcare 10 mg once daily for her overactive bladder.  She has already tried Ditropan.  If she does hold her urine for more than 2 hr she will be at higher chance of leaking with coughing.  The VESIcare should hopefully help with the urgency and the frequent voiding.  However she will need to continue to make sure she urinates at least every 2 hr and do Kegel's so that her stress incontinence does not result in leakage.     If VESIcare isn't covered she will call the insurance company to see what overactive bladder medicines are covered.    Follow-up in 3 months to see how she is doing on VESIcare and do another vaginal exam.    Will write her doctor's excuse she is stating that she does have overactive bladder and incontinence and needs to empty her bladder at minimum every 2 hr or less.     Route   Route Dr.Roscoes Sondra Cobb MD

## 2019-07-08 RX ORDER — CYANOCOBALAMIN 1000 UG/ML
1000 INJECTION, SOLUTION INTRAMUSCULAR; SUBCUTANEOUS
Qty: 12 ML | Refills: 3 | OUTPATIENT
Start: 2019-07-08

## 2019-07-09 ENCOUNTER — OFFICE VISIT (OUTPATIENT)
Dept: PULMONOLOGY | Facility: CLINIC | Age: 49
End: 2019-07-09
Payer: COMMERCIAL

## 2019-07-09 VITALS
HEART RATE: 85 BPM | WEIGHT: 211.06 LBS | DIASTOLIC BLOOD PRESSURE: 72 MMHG | SYSTOLIC BLOOD PRESSURE: 125 MMHG | BODY MASS INDEX: 36.23 KG/M2

## 2019-07-09 DIAGNOSIS — D84.9 IMMUNE DEFICIENCY DISORDER: Primary | ICD-10-CM

## 2019-07-09 DIAGNOSIS — R91.8 LUNG NODULES: ICD-10-CM

## 2019-07-09 DIAGNOSIS — J45.50 SEVERE PERSISTENT ASTHMA WITHOUT COMPLICATION: ICD-10-CM

## 2019-07-09 PROCEDURE — 99999 PR PBB SHADOW E&M-EST. PATIENT-LVL III: CPT | Mod: PBBFAC,,, | Performed by: INTERNAL MEDICINE

## 2019-07-09 PROCEDURE — 99205 OFFICE O/P NEW HI 60 MIN: CPT | Mod: S$GLB,,, | Performed by: INTERNAL MEDICINE

## 2019-07-09 PROCEDURE — 99205 PR OFFICE/OUTPT VISIT, NEW, LEVL V, 60-74 MIN: ICD-10-PCS | Mod: S$GLB,,, | Performed by: INTERNAL MEDICINE

## 2019-07-09 PROCEDURE — 99999 PR PBB SHADOW E&M-EST. PATIENT-LVL III: ICD-10-PCS | Mod: PBBFAC,,, | Performed by: INTERNAL MEDICINE

## 2019-07-09 RX ORDER — CETIRIZINE HYDROCHLORIDE 10 MG/1
10 TABLET ORAL DAILY
COMMUNITY
End: 2021-09-30

## 2019-07-09 RX ORDER — SELENIUM 50 MCG
200 TABLET ORAL DAILY
COMMUNITY

## 2019-07-09 RX ORDER — FLUTICASONE FUROATE AND VILANTEROL 200; 25 UG/1; UG/1
1 POWDER RESPIRATORY (INHALATION) DAILY
Qty: 1 EACH | Refills: 5 | Status: SHIPPED | OUTPATIENT
Start: 2019-07-09 | End: 2019-08-02 | Stop reason: ALTCHOICE

## 2019-07-09 NOTE — PROGRESS NOTES
"7/9/2019    Sondra Beltran  New Patient Consult    Chief Complaint   Patient presents with    Consult     spot seen on ct right lung        HPI: pt dx asthma age 24,but had symptoms for years, has immune deficiency on immune replacement.  Pt uses steroids frequently - longest  Ever without steroids 3 months.  Took 40/d for 2 wks recently. No hosp stays.  Gets immune shots. No xolair.  Had sinus surg past. Had ct chest/sinuses  - may 27, 2019 - sinus ct wide open.  Ct chest with 4 mm lingular nodule and rml nodule also- karen model 2 of 1000 cancer risk.  No bronchial cultures.  Nocturnal arousals 1/monthly.  Rescue therapy-2/wk.      Uses singulair/symbicort and spiriva.    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          The chief compliant  problem is new to me",   PFSH:  Past Medical History:   Diagnosis Date    Asthma     Benign essential HTN 6/19/2017    CVID (common variable immunodeficiency)     Neck strain 5/17/2016    Thyroid nodule 6/28/2017         Past Surgical History:   Procedure Laterality Date    cervial cone surgery      CYSTOSCOPY  6/6/2019    [facility] OR    CYSTOSCOPY N/A 4/22/2019    [facility]    EXCISION, LESION, VAGINA N/A 6/6/2019    [facility] OR    HYSTERECTOMY      KNEE SURGERY      nose surgery      VAGINOSCOPY N/A 4/22/2019    [facility]     Social History     Tobacco Use    Smoking status: Never Smoker    Smokeless tobacco: Never Used   Substance Use Topics    Alcohol use: No    Drug use: No     Family History   Problem Relation Age of Onset    Diabetes Mother     Hypertension Mother     Heart disease Mother     Rheum arthritis Mother     Heart disease Father     Hypertension Father      Review of patient's allergies indicates:   Allergen Reactions    Bactrim " [sulfamethoxazole-trimethoprim] Rash       Performance Status:The patient's activity level is no limits with regular activity.      Review of Systems:  a review of eleven systems covering constitutional, Eye, HEENT, Psych, Respiratory, Cardiac, GI, , Musculoskeletal, Endocrine, Dermatologic was negative except for pertinent findings as listed ABOVE and below:  pertinent positive as above, rest is good       Exam:Comprehensive exam done. /72   Pulse 85   Wt 95.8 kg (211 lb 1.5 oz)   BMI 36.23 kg/m²   Exam included Vitals as listed, and patient's appearance and affect and alertness and mood, oral exam for yeast and hygiene and pharynx lesions and Mallapatti (M) score, neck with inspection for jvd and masses and thyroid abnormalities and lymph nodes (supraclavicular and infraclavicular nodes and axillary also examined and noted if abn), chest exam included symmetry and effort and fremitus and percussion and auscultation, cardiac exam included rhythm and gallops and murmur and rubs and jvd and edema, abdominal exam for mass and hepatosplenomegaly and tenderness and hernias and bowel sounds, Musculoskeletal exam with muscle tone and posture and mobility/gait and  strength, and skin for rashes and cyanosis and pallor and turgor, extremity for clubbing.  Findings were normal except for pertinent findings listed below:  M2,chest is symmetric, no distress, normal percussion, normal fremitus and good normal breath sounds , no edema.      Radiographs (ct chest and cxr) reviewed: view by direct vision  4mm lingular and rml pleural nodules seen.      Labs reviewed           PFT was done   and result was   \na      Plan:  Clinical impression is apparently straight forward and impression with management as below.     Sondra was seen today for consult.    Diagnoses and all orders for this visit:    Immune deficiency disorder    Severe persistent asthma without complication  -     fluticasone furoate-vilanterol  (BREO ELLIPTA) 200-25 mcg/dose DsDv diskus inhaler; Inhale 1 puff into the lungs once daily. Controller    Lung nodules        Follow up if symptoms worsen or fail to improve.    Discussed with patient above for education the following:      Patient Instructions   speicialized therapy for severe persistent asthma may help.  Dupixent?  Steroid use if high, if lungs function cannot be normalized - may consider.      Trial breo 200  In place of symbicort reasonable- not both, paper script.    singulair works 8/10 pts - not all.    Azithromycin may act as anti inflammatory with regular use?    Steroids long term - weight gain, diabetes, osteoporosis    4 mm nodules - 2 seen - risk by model 2 of 1000, follow up not needed.   No bronchiectasis seen.    Steroid use makes asthma severe persistent-

## 2019-07-09 NOTE — PATIENT INSTRUCTIONS
speicialized therapy for severe persistent asthma may help.  Dupixent?  Steroid use if high, if lungs function cannot be normalized - may consider.      Trial breo 200  In place of symbicort reasonable- not both, paper script.    singulair works 8/10 pts - not all.    Azithromycin may act as anti inflammatory with regular use?    Steroids long term - weight gain, diabetes, osteoporosis    4 mm nodules - 2 seen - risk by model 2 of 1000, follow up not needed.   No bronchiectasis seen.    Steroid use makes asthma severe persistent-

## 2019-07-19 ENCOUNTER — PATIENT OUTREACH (OUTPATIENT)
Dept: ADMINISTRATIVE | Facility: HOSPITAL | Age: 49
End: 2019-07-19

## 2019-08-02 ENCOUNTER — OFFICE VISIT (OUTPATIENT)
Dept: FAMILY MEDICINE | Facility: CLINIC | Age: 49
End: 2019-08-02
Payer: COMMERCIAL

## 2019-08-02 VITALS
WEIGHT: 206.81 LBS | TEMPERATURE: 99 F | HEIGHT: 64 IN | BODY MASS INDEX: 35.31 KG/M2 | SYSTOLIC BLOOD PRESSURE: 122 MMHG | HEART RATE: 68 BPM | DIASTOLIC BLOOD PRESSURE: 68 MMHG

## 2019-08-02 DIAGNOSIS — I10 ESSENTIAL HYPERTENSION: Primary | ICD-10-CM

## 2019-08-02 DIAGNOSIS — R09.81 SINUS CONGESTION: ICD-10-CM

## 2019-08-02 DIAGNOSIS — Z76.0 MEDICATION REFILL: ICD-10-CM

## 2019-08-02 PROCEDURE — 3008F PR BODY MASS INDEX (BMI) DOCUMENTED: ICD-10-PCS | Mod: S$GLB,,, | Performed by: FAMILY MEDICINE

## 2019-08-02 PROCEDURE — 99214 OFFICE O/P EST MOD 30 MIN: CPT | Mod: S$GLB,,, | Performed by: FAMILY MEDICINE

## 2019-08-02 PROCEDURE — 99999 PR PBB SHADOW E&M-EST. PATIENT-LVL III: ICD-10-PCS | Mod: PBBFAC,,, | Performed by: FAMILY MEDICINE

## 2019-08-02 PROCEDURE — 3008F BODY MASS INDEX DOCD: CPT | Mod: S$GLB,,, | Performed by: FAMILY MEDICINE

## 2019-08-02 PROCEDURE — 99999 PR PBB SHADOW E&M-EST. PATIENT-LVL III: CPT | Mod: PBBFAC,,, | Performed by: FAMILY MEDICINE

## 2019-08-02 PROCEDURE — 99214 PR OFFICE/OUTPT VISIT, EST, LEVL IV, 30-39 MIN: ICD-10-PCS | Mod: S$GLB,,, | Performed by: FAMILY MEDICINE

## 2019-08-02 RX ORDER — OLMESARTAN MEDOXOMIL 5 MG/1
5 TABLET ORAL DAILY
Qty: 30 TABLET | Refills: 3 | Status: SHIPPED | OUTPATIENT
Start: 2019-08-02 | End: 2019-08-06 | Stop reason: SDUPTHER

## 2019-08-02 RX ORDER — CYANOCOBALAMIN 1000 UG/ML
INJECTION, SOLUTION INTRAMUSCULAR; SUBCUTANEOUS
Qty: 30 ML | Refills: 3 | Status: SHIPPED | OUTPATIENT
Start: 2019-08-02 | End: 2022-11-02

## 2019-08-02 NOTE — PATIENT INSTRUCTIONS
Dextromethorphan; Guaifenesin capsules and ER tablets  What is this medicine?  DEXTROMETHORPHAN; GUAIFENESIN (dex troe meth OR fan; gwye FEN e sin) is a cough suppressant, expectorant combination. It is used to provide relief from cough. This medicine will not treat an infection.  How should I use this medicine?  Take this medicine by mouth with a full glass of water. Follow the directions on the prescription label. Do not crush or chew. Take your medicine at regular intervals. Do not take your medicine more often than directed.  Talk to your pediatrician regarding the use of this medicine in children. While this drug may be prescribed for children as young as 6 years of age for selected conditions, precautions do apply.  What side effects may I notice from receiving this medicine?  Side effects that you should report to your doctor or health care professional as soon as possible:  · allergic reactions like skin rash, itching or hives, swelling of the face, lips, or tongue  · confusion  · excitement, nervousness, restlessness, or irritability  · slow or troubled breathing  Side effects that usually do not require medical attention (report to your doctor or health care professional if they continue or are bothersome):  · headache  · stomach upset  What may interact with this medicine?  Do not take this medicine with any of the following medications:  · MAOIs like Carbex, Eldepryl, Marplan, Nardil, and Parnate  · procarbazine  This medicine may also interact with the following medications:  · medicines for depression or other mental disturbances  · other medicines for colds or allergy  What if I miss a dose?  If you miss a dose, take it as soon as you can. If it is almost time for your next dose, take only that dose. Do not take double or extra doses.  Where should I keep my medicine?  Keep out of the reach of children.  Store at room temperature between 15 and 30 degrees C (59 and 86 degrees F). Protect from light  and moisture. Throw away any unused medicine after the expiration date.  What should I tell my health care provider before I take this medicine?  They need to know if you have any of these conditions:  · asthma  · chronic bronchitis  · emphysema  · if you have taken an MAOI like Carbex, Eldepryl, Marplan, Nardil, or Parnate in last 14 days  · kidney or liver disease  · unable to sit up  · an unusual or allergic reaction to dextromethorphan, guaifenesin, other medicines, foods, dyes, bromides, or preservatives  · pregnant or trying to get pregnant  · breast-feeding  What should I watch for while using this medicine?  Tell your doctor if your symptoms do not improve within 5 days or if they get worse. If you have a high fever, skin rash, lasting headache, or sore throat, see your doctor.  Drink 6 to 8 glasses of water daily while you are taking this medicine to help loosen mucus.  You may get drowsy or dizzy. Do not drive, use machinery, or do anything that needs mental alertness until you know how this medicine affects you. Do not stand or sit up quickly, especially if you are an older patient. This reduces the risk of dizzy or fainting spells. Alcohol may interfere with the effect of this medicine. Avoid alcoholic drinks.  NOTE:This sheet is a summary. It may not cover all possible information. If you have questions about this medicine, talk to your doctor, pharmacist, or health care provider. Copyright© 2017 [employer]        Fluticasone nasal spray  What is this medicine?  FLUTICASONE (floo TIK a sone) is a corticosteroid. This medicine is used to treat the symptoms of allergies like sneezing, itchy red eyes, and itchy, runny, or stuffy nose.  How should I use this medicine?  This medicine is for use in the nose. Follow the directions on your product or prescription label. This medicine works best if used at regular intervals. Do not use more often than directed. Make sure that you are using your nasal spray  correctly. After 6 months of daily use without a prescription, talk to your doctor or health care professional before using it for a longer time. Ask your doctor or health care professional if you have any questions.  Talk to your pediatrician regarding the use of this medicine in children. Special care may be needed. This medicine has been used in children as young as 2 years. After two months of daily use without a prescription in a child, talk to your pediatrician before using it for a longer time.  What side effects may I notice from receiving this medicine?  Side effects that you should report to your doctor or health care professional as soon as possible:  · allergic reactions like skin rash, itching or hives, swelling of the face, lips, or tongue  · changes in vision  · flu-like symptoms  · white patches or sores in the mouth or nose  Side effects that usually do not require medical attention (report to your doctor or health care professional if they continue or are bothersome):  · burning or irritation inside the nose or throat  · cough  · headache  · nosebleed  · unusual taste or smell  What may interact with this medicine?  · ketoconazole  · metyrapone  · some medicines for HIV  · vaccines  What if I miss a dose?  If you miss a dose, use it as soon as you remember. If it is almost time for your next dose, use only that dose and continue with your regular schedule. Do not use double or extra doses.  Where should I keep my medicine?  Keep out of the reach of children.  Store at room temperature between 15 and 30 degrees C (59 and 86 degrees F). Throw away any unused medicine after the expiration date.  What should I tell my health care provider before I take this medicine?  They need to know if you have any of these conditions:  · infection, like tuberculosis, herpes, or fungal infection  · recent surgery on nose or sinuses  · taking corticosteroid by mouth  · an unusual or allergic reaction to fluticasone,  steroids, other medicines, foods, dyes, or preservatives  · pregnant or trying to get pregnant  · breast-feeding  What should I watch for while using this medicine?  Visit your doctor or health care professional for regular checks on your progress. Some symptoms may improve within 12 hours after starting use. Check with your doctor or health care professional if there is no improvement in your condition after 3 weeks of use.  Do not come in contact with people who have chickenpox or the measles while you are taking this medicine. If you do, call your doctor right away.  NOTE:This sheet is a summary. It may not cover all possible information. If you have questions about this medicine, talk to your doctor, pharmacist, or health care provider. Copyright© 2017 [employer]        Olmesartan tablets  What is this medicine?  OLMESARTAN (all mi RITO tan) is used to treat high blood pressure.  How should I use this medicine?  Take this medicine by mouth with a glass of water. Follow the directions on the prescription label. This medicine can be taken with or without food. Take your doses at regular intervals. Do not take your medicine more often than directed. Do not stop taking except on the advice of your doctor or health care professional.  Talk to your pediatrician regarding the use of this medicine in children. While this drug may be prescribed for children as young as 6 years for selected conditions, precautions do apply.  What side effects may I notice from receiving this medicine?  Side effects that you should report to your doctor or health care professional as soon as possible:  · confusion, dizziness, light headedness or fainting spells  · decreased amount of urine passed  · diarrhea  · difficulty breathing or swallowing, hoarseness, or tightening of the throat  · fast or irregular heart beat, palpitations, or chest pain  · skin rash, itching  · swelling of your face, lips, tongue, hands, or  feet  · vomiting  · weight loss  Side effects that usually do not require medical attention (report to your doctor or health care professional if they continue or are bothersome):  · cough  · decreased sexual function or desire  · headache  · nasal congestion or stuffiness  · nausea  · sore or cramping muscles  What may interact with this medicine?  · blood pressure medicines  · diuretics, especially triamterene, spironolactone or amiloride  · potassium salts or potassium supplements  What if I miss a dose?  If you miss a dose, take it as soon as you can. If it is almost time for your next dose, take only that dose. Do not take double or extra doses.  Where should I keep my medicine?  Keep out of the reach of children.  Store your medicine at room temperature between 20 and 25 degrees C (68 and 77 degrees F). Throw away any unused medicine after the expiration date.  What should I tell my health care provider before I take this medicine?  They need to know if you have any of these conditions:  · if you are on a special diet, such as a low-salt diet  · kidney or liver disease  · an unusual or allergic reaction to olmesartan, other medicines, foods, dyes, or preservatives  · pregnant or trying to get pregnant  · breast-feeding  What should I watch for while using this medicine?  Visit your doctor or health care professional for regular checks on your progress. Check your blood pressure as directed. Ask your doctor or health care professional what your blood pressure should be and when you should contact him or her. Call your doctor or health care professional if you notice an irregular or fast heart beat.  Women should inform their doctor if they wish to become pregnant or think they might be pregnant. There is a potential for serious side effects to an unborn child, particularly in the second or third trimester. Talk to your health care professional or pharmacist for more information.  You may get drowsy or dizzy. Do  not drive, use machinery, or do anything that needs mental alertness until you know how this drug affects you. Do not stand or sit up quickly, especially if you are an older patient. This reduces the risk of dizzy or fainting spells. Alcohol can make you more drowsy and dizzy. Avoid alcoholic drinks.  Avoid salt substitutes unless you are told otherwise by your doctor or health care professional.  Do not treat yourself for coughs, colds, or pain while you are taking this medicine without asking your doctor or health care professional for advice. Some ingredients may increase your blood pressure.  NOTE:This sheet is a summary. It may not cover all possible information. If you have questions about this medicine, talk to your doctor, pharmacist, or health care provider. Copyright© 2017 [employer]      Discharge Instructions: Taking Your Blood Pressure  Blood pressure is the force of blood as it moves from the heart through the blood vessels. You can take your own blood pressure reading using a digital monitor. Take readings as often as your healthcare provider instructs. Take your readings each time in the same way, using the same arm. Here are guidelines for taking your blood pressure.  The American Heart Association (AHA) recommends purchasing a blood pressure monitor that is validated and approved by the Association for the Advancement of Medical Instrumentation, the [place] [employer]pert[employer], and the International Protocol for the Validation of Automated BP Measuring Devices. If the blood pressure monitor is for a senior adult, a pregnant woman, or a child, make certain it is validated for use with such a population. For the most reliable readings, the AHA recommends an automatic, cuff-style, upper arm (bicep) monitor. The readings from finger and wrist monitors are not as reliable as the upper arm monitor.        Step 1. Relax    · Wait at least a half hour after smoking, eating, or exercising. Do not drink  coffee, tea, soda, or other caffeinated beverages before checking your blood pressure.   · Sit comfortably at a table. Place the monitor near you.  · Rest for a few minutes before you begin.        Step 2. Wrap the cuff    · Place your arm on the table, palm up. Put your arm in a position that is level with your heart. Wrap the cuff around your upper arm, about an inch above your elbow. Its best to wrap the cuff on bare skin, not over clothing.  · Make sure your cuff fits. If it doesnt wrap around your upper arm, order a larger cuff. A cuff that is too large or too small can result in an inaccurate blood pressure reading.           Step 3. Inflate the cuff    · Pump the cuff until the scale reads 200. If you have a self-inflating cuff, push the button that starts the pump.  · The cuff will tighten, then loosen.  · The numbers will change. When they stop changing, your blood pressure reading will appear.  · If you get a reading that is too high or too low for you, relax for a few minutes. Then do the test again.    Step 4. Write down the results  · Write down your blood pressure numbers. Cirilo the date and time. Keep your results in one place, such as a notebook.  · Remove the cuff from your arm. Turn off the machine.  · Take the readings with you to your medical appointments.  · If you start a new blood pressure medicine, or change a blood pressure medicine dose, note the day you started the new drug or dosage on your blood pressure recording sheet. This will help your healthcare provider monitor the effect of medication changes.     Date Last Reviewed: 4/27/2016 © 2000-2016 [employer]. [address] [place] [address], [place] [address]. All rights reserved. This information is not intended as a substitute for professional medical care. Always follow your healthcare professional's instructions.

## 2019-08-02 NOTE — PROGRESS NOTES
Subjective:       Patient ID: Sondra Beltran is a 49 y.o. female.    Chief Complaint: Establish Care    HPI    Sinus issues. Going on for the last couple of days. Ears feel like pressure. No fever or chills. Productive cough. Feels some congestion in her chest. Trying vitamin C. On it for about 4-5 days.     Past Medical History:   Diagnosis Date    Asthma     Benign essential HTN 6/19/2017    CVID (common variable immunodeficiency)     Neck strain 5/17/2016    Thyroid nodule 6/28/2017       Past Surgical History:   Procedure Laterality Date    cervial cone surgery      CYSTOSCOPY  6/6/2019    [facility]    CYSTOSCOPY N/A 4/22/2019    [facility]    EXCISION, LESION, VAGINA N/A 6/6/2019    [facility]    HYSTERECTOMY      KNEE SURGERY      nose surgery      VAGINOSCOPY N/A 4/22/2019    [facility]       Family History   Problem Relation Age of Onset    Diabetes Mother     Hypertension Mother     Heart disease Mother     Rheum arthritis Mother     Heart disease Father     Hypertension Father        Social History     Tobacco Use    Smoking status: Never Smoker    Smokeless tobacco: Never Used   Substance Use Topics    Alcohol use: No    Drug use: No       Social History     Substance and Sexual Activity   Sexual Activity Not on file          Current Outpatient Medications:     ascorbic acid (VITAMIN C) 500 MG tablet, Take 500 mg by mouth 3 (three) times daily., Disp: , Rfl:     azelastine (ASTELIN) 137 mcg (0.1 %) nasal spray, 2 sprays (274 mcg total) by Nasal route 2 (two) times daily., Disp: 90 mL, Rfl: 3    budesonide-formoterol 160-4.5 mcg (SYMBICORT) 160-4.5 mcg/actuation HFAA, Inhale 2 puffs into the lungs every 12 (twelve) hours. Controller, Disp: , Rfl:     cetirizine (ZYRTEC) 10 MG tablet, Take 10 mg by mouth once  daily., Disp: , Rfl:     cetirizine-pseudoephedrine 5-120 mg Tb12, Take 1 tablet by mouth., Disp: , Rfl:     cyanocobalamin 1,000 mcg/mL injection, INJECT 1 ML (1,000 MCG TOTAL) INTO THE SKIN EVERY 7 DAYS., Disp: , Rfl: 3    IMMUNE GLOB,WENCESLAO CAPRYLATE,IGG, (GAMUNEX IV), Inject into the vein every 21 days. , Disp: , Rfl:     levocetirizine (XYZAL) 5 MG tablet, Take 5 mg by mouth every evening., Disp: , Rfl:     lisinopril (PRINIVIL,ZESTRIL) 5 MG tablet, Take 1 tablet (5 mg total) by mouth once daily., Disp: 90 tablet, Rfl: 3    montelukast (SINGULAIR) 10 mg tablet, Take 1 tablet (10 mg total) by mouth 2 (two) times daily., Disp: 180 tablet, Rfl: 3    PROAIR HFA 90 mcg/actuation inhaler, Inhale 2 puffs into the lungs every 4 (four) hours as needed for Wheezing., Disp: 18 g, Rfl: 11    selenium 50 mcg Tab, Take 200 mcg by mouth once daily., Disp: , Rfl:     tiotropium bromide (SPIRIVA RESPIMAT) 1.25 mcg/actuation Mist, Inhale 2.5 mcg into the lungs 2 (two) times daily. Controller, Disp: , Rfl:     vitamin D 1000 units Tab, Take 185 mg by mouth once daily., Disp: , Rfl:      Review of patient's allergies indicates:   Allergen Reactions    Bactrim [sulfamethoxazole-trimethoprim] Rash            Review of Systems   Constitutional: Negative for chills and fever.   HENT: Positive for congestion. Negative for sore throat.    Eyes: Negative for visual disturbance.   Respiratory: Positive for cough. Negative for shortness of breath.    Cardiovascular: Negative for chest pain.   Gastrointestinal: Negative for abdominal pain, constipation, diarrhea, nausea and vomiting.   Genitourinary: Negative for dysuria.   Musculoskeletal: Negative for joint swelling.   Skin: Negative for rash and wound.   Neurological: Negative for dizziness and headaches.   Hematological: Does not bruise/bleed easily.           Objective:          Vitals:    08/02/19 1530   BP: 122/68   Pulse: 68   Temp: 98.5 °F (36.9 °C)   Weight: 93.8 kg  "(206 lb 12.7 oz)   Height: 5' 4" (1.626 m)       Physical Exam   Constitutional: She appears well-developed and well-nourished. She is cooperative. No distress.   HENT:   Head: Normocephalic and atraumatic.   Right Ear: Hearing and external ear normal.   Left Ear: Hearing and external ear normal.   Nose: Mucosal edema present.   Mouth/Throat: Posterior oropharyngeal erythema present.   Eyes: Conjunctivae, EOM and lids are normal.   Cardiovascular: Normal rate, regular rhythm, normal heart sounds and normal pulses.   Pulmonary/Chest: Effort normal and breath sounds normal.   Abdominal: Soft. Normal appearance and bowel sounds are normal. There is no tenderness.   Musculoskeletal: Normal range of motion.   Neurological: She is alert.   Skin: Skin is warm. No rash noted. No cyanosis.   Psychiatric: She has a normal mood and affect. Her speech is normal and behavior is normal. Cognition and memory are normal.   Vitals reviewed.              Assessment/Plan     Sondra was seen today for establish care.    Diagnoses and all orders for this visit:    Essential hypertension  -     Talked to patient about recent study linking ACE's to lung cancer. Patient would like to try something else. Will start olmesartan (BENICAR) 5 MG Tab; Take 1 tablet (5 mg total) by mouth once daily.    Sinus congestion        - Advised patient of home remedies (hot tea, honey, lemon), flonase, and decongestants. Call Monday if symptoms persists.     Medication refill  -     cyanocobalamin 1,000 mcg/mL injection; INJECT 1 ML (1,000 MCG TOTAL) INTO THE SKIN EVERY 7 DAYS.            Future Appointments   Date Time Provider Department Center   8/2/2019  3:40 PM Mayela Monk MD [facility] MED [place]   10/28/2019 11:00 AM Sondra Cobb MD [facility] UROLOGY [place]         Mayela Monk MD  [facility]     "

## 2019-08-06 ENCOUNTER — TELEPHONE (OUTPATIENT)
Dept: FAMILY MEDICINE | Facility: CLINIC | Age: 49
End: 2019-08-06

## 2019-08-06 DIAGNOSIS — I10 ESSENTIAL HYPERTENSION: ICD-10-CM

## 2019-08-06 RX ORDER — OLMESARTAN MEDOXOMIL 5 MG/1
5 TABLET ORAL DAILY
Qty: 30 TABLET | Refills: 3 | Status: SHIPPED | OUTPATIENT
Start: 2019-08-06 | End: 2019-11-25 | Stop reason: SDUPTHER

## 2019-08-06 NOTE — TELEPHONE ENCOUNTER
Sent request to Dr Monk to send to [facility].   Spoke to patient notified will have Dr Monk sent to correct pharmacy notified patient to notify office if rx is still too expensive

## 2019-08-06 NOTE — TELEPHONE ENCOUNTER
----- Message from Elvira Busby sent at 8/6/2019 11:15 AM CDT -----  Contact: self  Patient requesting to speak to nurse regarding during her appt she was prescribed a new blood pressure medication ,and it was sent to wrong pharmacy per patient     Patient also states the new prescribed medication is too expensive and will like something cheaper called in please     Patient will like sent to pharmacy listed below please     [id number] - [place], [place] - [address]  [address]   [place] [address]  Phone: [phone] Fax: [phone]    Patient contact is [phone] (home)

## 2019-08-13 ENCOUNTER — TELEPHONE (OUTPATIENT)
Dept: FAMILY MEDICINE | Facility: CLINIC | Age: 49
End: 2019-08-13

## 2019-08-13 NOTE — TELEPHONE ENCOUNTER
----- Message from Mayela Monk MD sent at 8/12/2019  1:19 PM CDT -----  How are her sinuses doing?

## 2019-08-16 ENCOUNTER — CLINICAL SUPPORT (OUTPATIENT)
Dept: FAMILY MEDICINE | Facility: CLINIC | Age: 49
End: 2019-08-16
Payer: COMMERCIAL

## 2019-08-16 VITALS — SYSTOLIC BLOOD PRESSURE: 112 MMHG | HEART RATE: 85 BPM | DIASTOLIC BLOOD PRESSURE: 66 MMHG

## 2019-08-16 PROCEDURE — 99999 PR PBB SHADOW E&M-EST. PATIENT-LVL I: ICD-10-PCS | Mod: PBBFAC,,,

## 2019-08-16 PROCEDURE — 99999 PR PBB SHADOW E&M-EST. PATIENT-LVL I: CPT | Mod: PBBFAC,,,

## 2019-08-16 NOTE — PROGRESS NOTES
Patient came to clinic for bp check takes medication per medication list.  First check manual 112/66 pulse 85

## 2019-09-17 ENCOUNTER — OFFICE VISIT (OUTPATIENT)
Dept: FAMILY MEDICINE | Facility: CLINIC | Age: 49
End: 2019-09-17
Payer: COMMERCIAL

## 2019-09-17 VITALS
HEART RATE: 79 BPM | HEIGHT: 64 IN | OXYGEN SATURATION: 97 % | WEIGHT: 207.25 LBS | DIASTOLIC BLOOD PRESSURE: 82 MMHG | TEMPERATURE: 99 F | SYSTOLIC BLOOD PRESSURE: 138 MMHG | BODY MASS INDEX: 35.38 KG/M2

## 2019-09-17 DIAGNOSIS — J40 BRONCHITIS: ICD-10-CM

## 2019-09-17 DIAGNOSIS — D84.9 IMMUNE DEFICIENCY DISORDER: ICD-10-CM

## 2019-09-17 DIAGNOSIS — B96.89 ACUTE BACTERIAL BRONCHITIS: ICD-10-CM

## 2019-09-17 DIAGNOSIS — J06.9 UPPER RESPIRATORY TRACT INFECTION, UNSPECIFIED TYPE: Primary | ICD-10-CM

## 2019-09-17 DIAGNOSIS — J20.8 ACUTE BACTERIAL BRONCHITIS: ICD-10-CM

## 2019-09-17 DIAGNOSIS — J45.50 SEVERE PERSISTENT ASTHMA WITHOUT COMPLICATION: ICD-10-CM

## 2019-09-17 PROCEDURE — 99213 PR OFFICE/OUTPT VISIT, EST, LEVL III, 20-29 MIN: ICD-10-PCS | Mod: S$GLB,,, | Performed by: PHYSICIAN ASSISTANT

## 2019-09-17 PROCEDURE — 99999 PR PBB SHADOW E&M-EST. PATIENT-LVL IV: CPT | Mod: PBBFAC,,, | Performed by: PHYSICIAN ASSISTANT

## 2019-09-17 PROCEDURE — 99999 PR PBB SHADOW E&M-EST. PATIENT-LVL IV: ICD-10-PCS | Mod: PBBFAC,,, | Performed by: PHYSICIAN ASSISTANT

## 2019-09-17 PROCEDURE — 99213 OFFICE O/P EST LOW 20 MIN: CPT | Mod: S$GLB,,, | Performed by: PHYSICIAN ASSISTANT

## 2019-09-17 RX ORDER — DOXYCYCLINE 100 MG/1
100 CAPSULE ORAL 2 TIMES DAILY
Qty: 20 CAPSULE | Refills: 0 | Status: SHIPPED | OUTPATIENT
Start: 2019-09-17 | End: 2019-09-27

## 2019-09-17 RX ORDER — PREDNISONE 10 MG/1
TABLET ORAL
Refills: 0 | COMMUNITY
Start: 2019-09-13 | End: 2021-01-15

## 2019-09-17 NOTE — PROGRESS NOTES
Subjective:       Patient ID: Sondra Beltran is a 49 y.o. female.    Chief Complaint: cough, sore throat    HPI   Cough, congestion, sore throat x 1 wk  Hx immune deficiency  Sees Dr Barnett immunity specialist  Started on steroids 4 days ago and on 20 mg prednisone today  Not on an antibiotic currently  Completed course of Avelox  About 3 wks ago  No fever  Recent CT of chest and sinuses normal  Review of Systems   Constitutional: Positive for activity change, chills and fatigue. Negative for appetite change, diaphoresis, fever and unexpected weight change.   HENT: Positive for congestion and postnasal drip. Negative for sinus pressure, sinus pain and sore throat.    Eyes: Negative.    Respiratory: Positive for cough and wheezing.    Cardiovascular: Negative.  Negative for chest pain and leg swelling.   Gastrointestinal: Negative.    Endocrine: Negative.    Genitourinary: Negative.    Musculoskeletal: Negative.    Skin: Negative.  Negative for rash.       Objective:      Physical Exam   Constitutional: She appears well-developed and well-nourished. No distress.   HENT:   Head: Normocephalic and atraumatic.   Right Ear: External ear normal.   Left Ear: External ear normal.   Nose: Nose normal.   Mouth/Throat: Oropharynx is clear and moist. No oropharyngeal exudate.   Sinuses non tender  Mucus clear   Eyes: Conjunctivae are normal. No scleral icterus.   Neck: Normal range of motion. Neck supple. No tracheal deviation present. No thyromegaly present.   Cardiovascular: Normal rate, regular rhythm, normal heart sounds and intact distal pulses. Exam reveals no gallop and no friction rub.   No murmur heard.  Pulmonary/Chest: Effort normal. No stridor. No respiratory distress. She has wheezes. She has no rales.   Faint expiratory wheeze and rhonchi   Musculoskeletal: She exhibits no edema.   Lymphadenopathy:     She has no cervical adenopathy.   Skin: Skin is warm and dry. No rash noted.   Vitals reviewed.       Assessment:       1. Upper respiratory tract infection, unspecified type    2. Bronchitis    3. Severe persistent asthma without complication    4. Acute bacterial bronchitis    5. Immune deficiency disorder        Plan:       Sondra was seen today for cough, sore throat.    Diagnoses and all orders for this visit:    Upper respiratory tract infection, unspecified type  -     doxycycline (MONODOX) 100 MG capsule; Take 1 capsule (100 mg total) by mouth 2 (two) times daily. for 10 days    Bronchitis  -     doxycycline (MONODOX) 100 MG capsule; Take 1 capsule (100 mg total) by mouth 2 (two) times daily. for 10 days    Severe persistent asthma without complication  -     doxycycline (MONODOX) 100 MG capsule; Take 1 capsule (100 mg total) by mouth 2 (two) times daily. for 10 days    Acute bacterial bronchitis  -     doxycycline (MONODOX) 100 MG capsule; Take 1 capsule (100 mg total) by mouth 2 (two) times daily. for 10 days    Immune deficiency disorder  -     doxycycline (MONODOX) 100 MG capsule; Take 1 capsule (100 mg total) by mouth 2 (two) times daily. for 10 days    continue current meds  Hydrate  Need to neb 2 or 3 times daily  Immediate f/u if sx worsen  Pt to check in with Dr Barnett

## 2019-09-26 ENCOUNTER — HOSPITAL ENCOUNTER (OUTPATIENT)
Dept: RADIOLOGY | Facility: HOSPITAL | Age: 49
Discharge: HOME | End: 2019-09-26
Attending: SPECIALIST
Payer: COMMERCIAL

## 2019-09-26 DIAGNOSIS — R05.9 COUGH: ICD-10-CM

## 2019-09-26 DIAGNOSIS — R05.9 COUGH: Primary | ICD-10-CM

## 2019-09-26 PROCEDURE — 71046 X-RAY EXAM CHEST 2 VIEWS: CPT | Mod: TC,PO

## 2019-10-29 DIAGNOSIS — J31.0 CHRONIC RHINITIS: Primary | ICD-10-CM

## 2019-10-29 DIAGNOSIS — D83.9 COMMON VARIABLE IMMUNODEFICIENCY: ICD-10-CM

## 2019-11-11 ENCOUNTER — HOSPITAL ENCOUNTER (OUTPATIENT)
Dept: RADIOLOGY | Facility: HOSPITAL | Age: 49
Discharge: HOME | End: 2019-11-11
Attending: NURSE PRACTITIONER
Payer: COMMERCIAL

## 2019-11-11 DIAGNOSIS — J31.0 CHRONIC RHINITIS: ICD-10-CM

## 2019-11-11 DIAGNOSIS — D83.9 COMMON VARIABLE IMMUNODEFICIENCY: ICD-10-CM

## 2019-11-11 PROCEDURE — 71250 CT THORAX DX C-: CPT | Mod: TC,PO

## 2019-11-25 ENCOUNTER — TELEPHONE (OUTPATIENT)
Dept: FAMILY MEDICINE | Facility: CLINIC | Age: 49
End: 2019-11-25

## 2019-11-25 ENCOUNTER — OFFICE VISIT (OUTPATIENT)
Dept: UROLOGY | Facility: CLINIC | Age: 49
End: 2019-11-25
Payer: COMMERCIAL

## 2019-11-25 VITALS
HEART RATE: 71 BPM | BODY MASS INDEX: 35.7 KG/M2 | WEIGHT: 209.13 LBS | DIASTOLIC BLOOD PRESSURE: 84 MMHG | HEIGHT: 64 IN | SYSTOLIC BLOOD PRESSURE: 158 MMHG

## 2019-11-25 DIAGNOSIS — N89.8 VAGINAL DISCHARGE: ICD-10-CM

## 2019-11-25 DIAGNOSIS — I10 ESSENTIAL HYPERTENSION: ICD-10-CM

## 2019-11-25 DIAGNOSIS — N89.8 VAGINAL WALL CYST: Primary | ICD-10-CM

## 2019-11-25 LAB
BILIRUB SERPL-MCNC: NORMAL MG/DL
BLOOD URINE, POC: NORMAL
COLOR, POC UA: YELLOW
GLUCOSE UR QL STRIP: NORMAL
KETONES UR QL STRIP: NORMAL
LEUKOCYTE ESTERASE URINE, POC: NORMAL
NITRITE, POC UA: NORMAL
PH, POC UA: 6
PROTEIN, POC: NORMAL
SPECIFIC GRAVITY, POC UA: 1.02
UROBILINOGEN, POC UA: NORMAL

## 2019-11-25 PROCEDURE — 99999 PR PBB SHADOW E&M-EST. PATIENT-LVL III: CPT | Mod: PBBFAC,,, | Performed by: UROLOGY

## 2019-11-25 PROCEDURE — 87481 CANDIDA DNA AMP PROBE: CPT | Mod: 59

## 2019-11-25 PROCEDURE — 99999 PR PBB SHADOW E&M-EST. PATIENT-LVL III: ICD-10-PCS | Mod: PBBFAC,,, | Performed by: UROLOGY

## 2019-11-25 PROCEDURE — 3008F PR BODY MASS INDEX (BMI) DOCUMENTED: ICD-10-PCS | Mod: S$GLB,,, | Performed by: UROLOGY

## 2019-11-25 PROCEDURE — 87801 DETECT AGNT MULT DNA AMPLI: CPT

## 2019-11-25 PROCEDURE — 81002 URINALYSIS NONAUTO W/O SCOPE: CPT | Mod: S$GLB,,, | Performed by: UROLOGY

## 2019-11-25 PROCEDURE — 3008F BODY MASS INDEX DOCD: CPT | Mod: S$GLB,,, | Performed by: UROLOGY

## 2019-11-25 PROCEDURE — 99214 OFFICE O/P EST MOD 30 MIN: CPT | Mod: 25,S$GLB,, | Performed by: UROLOGY

## 2019-11-25 PROCEDURE — 81002 POCT URINE DIPSTICK WITHOUT MICROSCOPE: ICD-10-PCS | Mod: S$GLB,,, | Performed by: UROLOGY

## 2019-11-25 PROCEDURE — 99214 PR OFFICE/OUTPT VISIT, EST, LEVL IV, 30-39 MIN: ICD-10-PCS | Mod: 25,S$GLB,, | Performed by: UROLOGY

## 2019-11-25 RX ORDER — ALBUTEROL SULFATE 0.83 MG/ML
SOLUTION RESPIRATORY (INHALATION)
Refills: 0 | COMMUNITY
Start: 2019-09-26 | End: 2021-09-30 | Stop reason: SDUPTHER

## 2019-11-25 RX ORDER — OLMESARTAN MEDOXOMIL 5 MG/1
TABLET ORAL
Qty: 30 TABLET | Refills: 3 | Status: SHIPPED | OUTPATIENT
Start: 2019-11-25 | End: 2020-03-23

## 2019-11-25 NOTE — PATIENT INSTRUCTIONS
Does not need any OAB med, urgency improved    Sending vaginal discharge off for affirm  May need flagyl twice day for 7 days is + for bacterial vaginosis  Likely prone to this bc of antibiotics use for immunocmpromised state for immune deficiency disease  If + for BV recommend daily probiotic    Vaginal wall cyst, small outpouching <0.5cm seen, unlikley recurrence  But will keep an eye on it  F/u in 6 months for repeat vaginal exam

## 2019-11-25 NOTE — TELEPHONE ENCOUNTER
----- Message from Shayna Delgadillo sent at 11/25/2019  9:24 AM CST -----  Contact: pt  The pt want to know if she can be switched back to the Lisinopril 5mg medication, the pt gave no additional info and can be reached at [phone]///[id number]

## 2019-11-25 NOTE — TELEPHONE ENCOUNTER
Spoke to patient states that her bp today 158/84 patient states it has been running around 140/90 patient declined increasing current dosage does not want to go back on lisinopril. Patient states she will monitor bp for 1 more month and notify office if bp is consistently elevated.

## 2019-11-25 NOTE — PROGRESS NOTES
"[facility] - [place]  Staff: MD Reza    Referring provider and please cc:    [facility]    Chief Complaint: urethral diverticulum    Subjective:        HPI: Sondra Beltran is a 49 y.o. female presents with     She presented to her gynecologist with c/o cyst in the vagina that recurred after drainage in her 20s. Between her first and second visit the pt and  saw that was increasing in size over 3 months (size of grape). She had called me out of concern that this could be a urethral diverticulum and I recommended an mri pelvis. 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 I reviewed the images but the diverticulum is not clear. Not tender to touch. Pt states she has a bulge. Denies and dribbling, dysuria or discharge. Some dyspareunia. No h/o recurrent uti's.     Cystoscopy on 4/22/19 showed vaginal lesion vs urethral diverticulum. Very likely vaginal cyst. Dr. Basurto saw her on 5/20/19 and pt stated she had a similar cyst years ago that was aspirated (fluid was yellow). 's exam noted that day  "Urethra: Non tender.  1.5 cm thin wall flaccid cyst on the left side of mid  Urethra.  It was aspirated with a 27 gauge needle and bloody fluid was obtained to be sent for cytology.  When it collapsed it did not feel completely empty and felt as if there was a nodular area inside it".  He felt that Suburethral cyst that is too proximal to likely be a Salado's duct cyst and too medial to be a Violeta duct cyst.  No connection to the urethra could be found suggest urethral diverticulum but in this location and the history of it having been present years ago would suggest that this was a diverticulum that no longer connects to the urethra because the ostium probably scarred closed.  That we aspirated bloody fluid and it felt like there was some solid component is more worrisome so I agree with  that this " "should be excised     Underwent  excision of suburethral mass, excision of vaginal epithelial tab and cystoscopy on 06/06/2019. Midline cystic fluid collection (bluish) about 2.5 x 1.5cm suburethral. Did not appear to connect with the urethra. Still possibly a previous urethral diverticulum that closed off since not c/w location for Garters, Bartholin or skene's gland. Cystoscopy revealed no significant abnormalities and urethra normal. The perineal mass she felt may have just been a hemhorroid or vaginal tag. Vaginal tag removed. She also had a small pocket in the vagina, thought to be from birth trauma but her family said that she had this before the birth of her first child noted by the gynecologist.  Pathology was negative for malignancy, showed a cyst.    Since it was removed a month ago she has had no bleeding, no issues no reaccumulation.  She continues to have overactive bladder with voiding Q 1-2 hours with urgency.  She also has stress urinary incontinence with coughing but denies urge urinary incontinence.  She previously tried Ditropan and said that this actually made her symptoms worse.  Stress test on 03/15 was negative.    Interval history:   Tried vesicare for freq/urg but made her feel "numb" and overall symptoms improved  Doesn't feel sac in vagina where cyst was   Having some abnormal smell from vagina, recent antibiotics use.       ua void: neg  Urine history:   5/29/19            Ng, void: neg  4/30/19            Cytology from vaginal cyst:negative for malignancy  4/22/19            No cx, void: tr wbc   4/15/19            Mult org, void: neg  3/15/19            No cx, void: neg, pvr by I&o: 10cc    REVIEW OF SYSTEMS:  General ROS: no fevers, no chills  Psychological ROS: no depression  Endocrine ROS: no heat or cold  Respiratory ROS: no SOB  Cardiovascular ROS: no CP  Gastrointestinal ROS: no abdominal pain, no constipation, no diarrhea, no BRBPR  Musculoskeletal ROS: no muscle " pain  Neurological ROS: no headaches  Dermatological ROS: no rashes  HEENT: +glasses, no sinus   ROS: per HPI     PMHx:  Past Medical History:   Diagnosis Date    Asthma     Benign essential HTN 6/19/2017    CVID (common variable immunodeficiency)     Neck strain 5/17/2016    Thyroid nodule 6/28/2017     Kidney stones: No    PSHx:  Past Surgical History:   Procedure Laterality Date    cervial cone surgery      CLOSURE OF VESICOVAGINAL FISTULA N/A 6/6/2019    Procedure: excision of vaginal lesion vs urethral diverticulectomy;  Surgeon: Sondra Cobb MD;  Location: [facility];  Service: Urology;  Laterality: N/A;   to assist, first case, please put  as co-surgeon    CYSTOSCOPY N/A 4/22/2019    Procedure: CYSTOSCOPY;  Surgeon: Sondra Cobb MD;  Location: [facility] OR;  Service: Urology;  Laterality: N/A;    HYSTERECTOMY      KNEE SURGERY      nose surgery      VAGINOSCOPY N/A 4/22/2019    Procedure: VAGINOSCOPY;  Surgeon: Sondra Cobb MD;  Location: [facility];  Service: Urology;  Laterality: N/A;     Urologic or Gynecologic Surgery: none    Stents/Valves/Foreign Bodies: none  Cardiologist: none  Gynecologist:   Immunogist:Dr.Querish Enciso Hx:   malignancies: father with bladder cancer 60s (smoker), gyn malignancies: No . Gm and aunt had colon cancer  kidney stones: No     Soc Hx:  No tobacco.    No alcohol  Lives in [place]  :yes  Children: 2  Occupation:     Allergies:  Bactrim [sulfamethoxazole-trimethoprim] - breaks out    Home Medications: reviewed   Urologic Medications: none  Anticoagulation: no    Objective:     Vitals:    11/25/19 0849   BP: (!) 158/84   Pulse: 71       General:WDWN in NAD  Eyes: PERRLA, normal conjunctiva  Respiratory: no increased work on breathing. No wheezing.   Cardiovascular: No obvious extremity edema. Warm and well perfused.   GI: no palpation of masses. No tenderness. No hepatosplenomegaly to  palpation.  Musculoskeletal: normal range of motion of bilateral upper extremities. Normal muscle strength and tone.  Skin: no obvious rashes or lesions. No tightening of skin noted.  Neurologic: CN grossly normal. Normal sensation.   Psychiatric: awake, alert and oriented x 3. Mood and affect normal. Cooperative.    Pelvic exam  External Genitalia: normal hair distribution, no lesions  Urethral meatus: normal without prolapse or caruncle  Urethra: without tenderness or mass, just proximal to orifice she has a vaginal lesion c/w cyst vs urethral diverticulum. No discharge with palpation of lesion. Soft, fluctuant  Bladder: without fulness or tenderness  Vagina: normal appearing. No discharge or lesions. no prolapse.   Anus and perineum: appear normal  In and out cath performed with 20cc residual    11/25/19  Small outpouching felt anterior to urethra but no sac,non tender, <0.5cm  Small amount of white discharge seen on speculum      LABS REVIEW:      Lab Results   Component Value Date    WBC 4.00 05/29/2019    HGB 13.5 05/29/2019    HCT 39.9 05/29/2019    MCV 90 05/29/2019     05/29/2019     BMP  Lab Results   Component Value Date     05/29/2019    K 4.0 05/29/2019     05/29/2019    CO2 28 05/29/2019    BUN 8 05/29/2019    CREATININE 0.7 05/29/2019    CALCIUM 9.4 05/29/2019    ANIONGAP 8 05/29/2019    ESTGFRAFRICA >60 05/29/2019    EGFRNONAA >60 05/29/2019         PATHOLOGY REVIEW:  FINAL PATHOLOGIC DIAGNOSIS 6/6/19  1. Suburethral cyst, excision:  Squamous epithelium with underlying benign cuboidal and squamous epithelium lined cyst.  No evidence of dysplasia or malignancy.  2. Vaginal nodule, biopsy:  Benign squamous epithelium with no evidence of dysplasia or malignancy consistent with benign fibroepithelial  polyp.    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 Vaginal wall cyst    2. Vaginal discharge          Plan:   Sondra Cobb MD    Does not need any OAB med, urgency improved    Sending vaginal discharge off for affirm  May need flagyl twice day for 7 days is + for bacterial vaginosis  Likely prone to this bc of antibiotics use for immunocmpromised state for immune deficiency disease  If + for BV recommend daily probiotic    Vaginal wall cyst, small outpouching <0.5cm seen on vaginal exam, unlikley recurrence  But will keep an eye on it  F/u in 6 months for repeat vaginal exam

## 2019-11-25 NOTE — TELEPHONE ENCOUNTER
What is her blood pressure looking like? If its still slightly uncontrolled john seen people use 10mg of the benicar and it worked. If she is adamant on getting back on the lisinopril and knows about the study then I can send it.

## 2019-11-25 NOTE — TELEPHONE ENCOUNTER
Spoke to patient. Patient states her bp has been high with changing medication to olmesartan patient is requesting to take lisinopril patient states her bp was lower on this medication. Patient is aware of recent study linked to lung cancer. Offered to scheduled appointment for nurse visit patient states she lives in [place] and states Dr Monk told her she can just call if needed     Dr Monk:  Please advise

## 2019-11-26 LAB
BACTERIAL VAGINOSIS DNA: NEGATIVE
CANDIDA GLABRATA DNA: NEGATIVE
CANDIDA KRUSEI DNA: NEGATIVE
CANDIDA RRNA VAG QL PROBE: NEGATIVE
T VAGINALIS RRNA GENITAL QL PROBE: NEGATIVE

## 2019-12-05 DIAGNOSIS — Z12.31 ENCOUNTER FOR SCREENING MAMMOGRAM FOR MALIGNANT NEOPLASM OF BREAST: Primary | ICD-10-CM

## 2019-12-20 ENCOUNTER — HOSPITAL ENCOUNTER (OUTPATIENT)
Dept: RADIOLOGY | Facility: HOSPITAL | Age: 49
Discharge: HOME | End: 2019-12-20
Attending: SPECIALIST
Payer: COMMERCIAL

## 2019-12-20 VITALS — HEIGHT: 64 IN | WEIGHT: 209 LBS | BODY MASS INDEX: 35.68 KG/M2

## 2019-12-20 DIAGNOSIS — Z12.31 ENCOUNTER FOR SCREENING MAMMOGRAM FOR MALIGNANT NEOPLASM OF BREAST: ICD-10-CM

## 2019-12-20 PROCEDURE — 77063 BREAST TOMOSYNTHESIS BI: CPT | Mod: TC,PO

## 2020-03-22 DIAGNOSIS — I10 ESSENTIAL HYPERTENSION: ICD-10-CM

## 2020-03-23 RX ORDER — OLMESARTAN MEDOXOMIL 5 MG/1
TABLET ORAL
Qty: 90 TABLET | Refills: 1 | Status: SHIPPED | OUTPATIENT
Start: 2020-03-23 | End: 2020-09-17 | Stop reason: SDUPTHER

## 2020-05-05 ENCOUNTER — PATIENT MESSAGE (OUTPATIENT)
Dept: ADMINISTRATIVE | Facility: HOSPITAL | Age: 50
End: 2020-05-05

## 2020-05-17 ENCOUNTER — PATIENT OUTREACH (OUTPATIENT)
Dept: ADMINISTRATIVE | Facility: OTHER | Age: 50
End: 2020-05-17

## 2020-07-26 ENCOUNTER — PATIENT OUTREACH (OUTPATIENT)
Dept: ADMINISTRATIVE | Facility: OTHER | Age: 50
End: 2020-07-26

## 2020-07-26 NOTE — PROGRESS NOTES
Chart was reviewed for overdue Proactive [facility] Encounters ([facility])  topics  Updates were requested from care everywhere  Health Maintenance has been updated

## 2020-07-30 ENCOUNTER — OFFICE VISIT (OUTPATIENT)
Dept: UROLOGY | Facility: CLINIC | Age: 50
End: 2020-07-30
Payer: COMMERCIAL

## 2020-07-30 VITALS
WEIGHT: 213.19 LBS | DIASTOLIC BLOOD PRESSURE: 85 MMHG | RESPIRATION RATE: 18 BRPM | SYSTOLIC BLOOD PRESSURE: 145 MMHG | HEIGHT: 64 IN | HEART RATE: 104 BPM | BODY MASS INDEX: 36.4 KG/M2

## 2020-07-30 DIAGNOSIS — N89.8 VAGINAL WALL CYST: Primary | ICD-10-CM

## 2020-07-30 PROCEDURE — 99213 OFFICE O/P EST LOW 20 MIN: CPT | Mod: S$GLB,,, | Performed by: UROLOGY

## 2020-07-30 PROCEDURE — 99213 PR OFFICE/OUTPT VISIT, EST, LEVL III, 20-29 MIN: ICD-10-PCS | Mod: S$GLB,,, | Performed by: UROLOGY

## 2020-07-30 PROCEDURE — 99999 PR PBB SHADOW E&M-EST. PATIENT-LVL IV: CPT | Mod: PBBFAC,,, | Performed by: UROLOGY

## 2020-07-30 PROCEDURE — 99999 PR PBB SHADOW E&M-EST. PATIENT-LVL IV: ICD-10-PCS | Mod: PBBFAC,,, | Performed by: UROLOGY

## 2020-07-30 RX ORDER — FLUTICASONE PROPIONATE 50 MCG
SPRAY, SUSPENSION (ML) NASAL
COMMUNITY
Start: 2020-07-23 | End: 2022-11-02 | Stop reason: SDUPTHER

## 2020-07-30 NOTE — PROGRESS NOTES
"  [facility] Note - [place]  Staff: MD Reza  Pcp: Mayela Monk MD    MyOgeorgiananer: inactive    Chief Complaint: urethral diverticulum    Subjective:        HPI: Sondra Beltran is a 50 y.o. female presents with     She presented to her gynecologist with c/o cyst in the vagina that recurred after drainage in her 20s. Between her first and second visit the pt and  saw that was increasing in size over 3 months (size of grape). She had called me out of concern that this could be a urethral diverticulum and I recommended an mri pelvis. 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 I reviewed the images but the diverticulum is not clear. Not tender to touch. Pt states she has a bulge. Denies and dribbling, dysuria or discharge. Some dyspareunia. No h/o recurrent uti's.     Cystoscopy on 4/22/19 showed vaginal lesion vs urethral diverticulum. Very likely vaginal cyst. Dr. Basurto saw her on 5/20/19 and pt stated she had a similar cyst years ago that was aspirated (fluid was yellow). 's exam noted that day  "Urethra: Non tender.  1.5 cm thin wall flaccid cyst on the left side of mid  Urethra.  It was aspirated with a 27 gauge needle and bloody fluid was obtained to be sent for cytology.  When it collapsed it did not feel completely empty and felt as if there was a nodular area inside it".  He felt that Suburethral cyst that is too proximal to likely be a Lewisburg's duct cyst and too medial to be a Violeta duct cyst.  No connection to the urethra could be found suggest urethral diverticulum but in this location and the history of it having been present years ago would suggest that this was a diverticulum that no longer connects to the urethra because the ostium probably scarred closed.  That we aspirated bloody fluid and it felt like there was some solid component is more worrisome so I agree with  that this should be excised     Underwent  " "excision of suburethral mass, excision of vaginal epithelial tab and cystoscopy on 06/06/2019. Midline cystic fluid collection (bluish) about 2.5 x 1.5cm suburethral. Did not appear to connect with the urethra. Still possibly a previous urethral diverticulum that closed off since not c/w location for Garters, Bartholin or skene's gland. Cystoscopy revealed no significant abnormalities and urethra normal. The perineal mass she felt may have just been a hemhorroid or vaginal tag. Vaginal tag removed. She also had a small pocket in the vagina, thought to be from birth trauma but her family said that she had this before the birth of her first child noted by the gynecologist.  Pathology was negative for malignancy, showed a cyst.    Since it was removed a month ago she has had no bleeding, no issues no reaccumulation.  She continues to have overactive bladder with voiding Q 1-2 hours with urgency.  She also has stress urinary incontinence with coughing but denies urge urinary incontinence.  She previously tried Ditropan and said that this actually made her symptoms worse.  Stress test on 03/15 was negative.    Interval history 11/25/19:   Tried vesicare for freq/urg but made her feel "numb" and overall symptoms improved  Doesn't feel sac in vagina where cyst was   Having some abnormal smell from vagina, recent antibiotics use.   Exam: Small outpouching felt anterior to urethra but no sac,non tender, <0.5cm  Small amount of white discharge seen on speculum. Vaginosis screen neg    Interval history 7/30/20:   No recurrence of mass or cyst. No uti's. No pain.   ua void neg  Exam today, midline urethra with bulge (excess tissue?) unchanged from previous. No expression from urethra of any discharge when palpated. No tenderness.n ot fluctuant or soft.       ua void: neg  Urine history:   11/25/19 Neg, vaginosis screen neg  5/29/19            Ng, void: neg  4/30/19            Cytology from vaginal cyst:negative for " malignancy  4/22/19            No cx, void: tr wbc   4/15/19            Mult org, void: neg  3/15/19            No cx, void: neg, pvr by I&o: 10cc    REVIEW OF SYSTEMS:  General ROS: no fevers, no chills  Psychological ROS: no depression  Endocrine ROS: no heat or cold  Respiratory ROS: no SOB  Cardiovascular ROS: no CP  Gastrointestinal ROS: no abdominal pain, no constipation, no diarrhea, no BRBPR  Musculoskeletal ROS: no muscle pain  Neurological ROS: no headaches  Dermatological ROS: no rashes  HEENT: +glasses, no sinus   ROS: per HPI     PMHx:  Past Medical History:   Diagnosis Date    Asthma     Benign essential HTN 6/19/2017    CVID (common variable immunodeficiency)     Neck strain 5/17/2016    Thyroid nodule 6/28/2017     Kidney stones: No    PSHx:  Past Surgical History:   Procedure Laterality Date    cervial cone surgery      CLOSURE OF VESICOVAGINAL FISTULA N/A 6/6/2019    Procedure: excision of vaginal lesion vs urethral diverticulectomy;  Surgeon: Sondra Cobb MD;  [facility]: [facility];  Service: Urology;  Laterality: N/A;   to assist, first case, please put  as co-surgeon    CYSTOSCOPY N/A 4/22/2019    Procedure: CYSTOSCOPY;  Surgeon: Sondra Cobb MD;  Location: [facility] OR;  Service: Urology;  Laterality: N/A;    HYSTERECTOMY      KNEE SURGERY      nose surgery      VAGINOSCOPY N/A 4/22/2019    Procedure: VAGINOSCOPY;  Surgeon: Sondra Cobb MD;  Location: [facility];  Service: Urology;  Laterality: N/A;     Urologic or Gynecologic Surgery: none    Stents/Valves/Foreign Bodies: none  Cardiologist: none  Gynecologist:   Immunogist:Dr.Querish Enciso Hx:   malignancies: father with bladder cancer 60s (smoker), gyn malignancies: No . Gm and aunt had colon cancer  kidney stones: No     Soc Hx:  No tobacco.    No alcohol  Lives in [place]  :yes  Children: 2  Occupation:     Allergies:  Bactrim  [sulfamethoxazole-trimethoprim] - breaks out    Home Medications: reviewed   Urologic Medications: none  Anticoagulation: no    Objective:     Vitals:    07/30/20 1316   BP: (!) 145/85   Pulse: 104   Resp: 18       General:WDWN in NAD  Eyes: PERRLA, normal conjunctiva  Respiratory: no increased work on breathing. No wheezing.   Cardiovascular: No obvious extremity edema. Warm and well perfused.   GI: no palpation of masses. No tenderness. No hepatosplenomegaly to palpation.  Musculoskeletal: normal range of motion of bilateral upper extremities. Normal muscle strength and tone.  Skin: no obvious rashes or lesions. No tightening of skin noted.  Neurologic: CN grossly normal. Normal sensation.   Psychiatric: awake, alert and oriented x 3. Mood and affect normal. Cooperative.    Vaginal exam 7/30/20  Exam today, midline urethra with bulge (excess tissue?) unchanged from previous. No expression from urethra of any discharge when palpated. No tenderness.n ot fluctuant or soft.       LABS REVIEW:  Recent Labs   Lab 07/27/18  0828 05/29/19  0904   WBC 4.45 4.00   Hemoglobin 13.1 13.5   Hematocrit 39.8 39.9   Platelets 217 215   ]  Recent Labs   Lab 07/27/18  0828 02/01/19  0942 05/29/19  0904   Sodium 140 140 141   Potassium 4.1 4.2 4.0   Chloride 103 104 105   CO2 29 29 28   BUN, Bld 9 13 8   Creatinine 0.7 0.8 0.7   Glucose 97 78 82   Calcium 9.7 9.3 9.4   Alkaline Phosphatase 80 77  --    Total Protein 6.9 7.6  --    Albumin 3.4 L 3.5  --    Total Bilirubin 1.1 H 0.9  --    AST 21 22  --    ALT 25 25  --    ]    No results found for: LABA1C, HGBA1C      PATHOLOGY REVIEW: See hpi for recent     FINAL PATHOLOGIC DIAGNOSIS 6/6/19  1. Suburethral cyst, excision:  Squamous epithelium with underlying benign cuboidal and squamous epithelium lined cyst.  No evidence of dysplasia or malignancy.  2. Vaginal nodule, biopsy:  Benign squamous epithelium with no evidence of dysplasia or malignancy consistent with benign  fibroepithelial  polyp.    URINE CYTOLOGY 4/30/19 drained from cyst drainage by   NO HIGH GRADE UROTHELIAL NEOPLASIA IDENTIFIED ([facility] system)      RADIOGRAPHIC REVIEW: See hpi for recent     Mri pelvis 2/14/19  Simple fluid enhancing cystic lesion extending from the psoterior midline of the urethra measuring 7x4mm  Most c/w a urethral diverticulum at the orifice     Assessment:       1. Vaginal wall cyst      Vaginal wall cyst s/p excision  Presumed previous urethral diverticulum possibly    Plan:   Sondra Cobb MD    Exam today, midline urethra with bulge (excess tissue?) unchanged from previous. No expression from urethra of any discharge when palpated. No tenderness.n ot fluctuant or soft.     At this point no further f/u. If she develops any difficulty urinating, uti, increasing bulging she will let us know and we would repeat an MRI pelvis to confir, no diverticulum.

## 2020-09-17 DIAGNOSIS — I10 ESSENTIAL HYPERTENSION: ICD-10-CM

## 2020-09-17 RX ORDER — OLMESARTAN MEDOXOMIL 5 MG/1
5 TABLET ORAL DAILY
Qty: 30 TABLET | Refills: 0 | Status: SHIPPED | OUTPATIENT
Start: 2020-09-17 | End: 2020-09-28 | Stop reason: SDUPTHER

## 2020-09-17 NOTE — TELEPHONE ENCOUNTER
----- Message from Patrice Menezes sent at 9/17/2020  9:55 AM CDT -----  Type:  RX Refill Request    Who Called:  Patient  Refill or New Rx:  Refill  RX Name and Strength:  olmesartan (BENICAR) 5 MG Tab  How is the patient currently taking it? (ex. 1XDay):  As ordered  Is this a 30 day or 90 day RX:  90  Preferred Pharmacy with phone number:    [facility] [address] [place] [address], [place] - [address]  [address]  [place] [address]  Phone: [phone] Fax: [phone]  Local or Mail Order:  Local  Ordering Provider:  Dr. Lacho Baldwin Call Back Number:  [phone]  Additional Information:  NA

## 2020-09-28 ENCOUNTER — OFFICE VISIT (OUTPATIENT)
Dept: FAMILY MEDICINE | Facility: CLINIC | Age: 50
End: 2020-09-28
Payer: COMMERCIAL

## 2020-09-28 VITALS
SYSTOLIC BLOOD PRESSURE: 134 MMHG | BODY MASS INDEX: 35.95 KG/M2 | WEIGHT: 210.56 LBS | HEART RATE: 87 BPM | HEIGHT: 64 IN | TEMPERATURE: 97 F | DIASTOLIC BLOOD PRESSURE: 72 MMHG

## 2020-09-28 DIAGNOSIS — I10 BENIGN ESSENTIAL HTN: ICD-10-CM

## 2020-09-28 DIAGNOSIS — Z78.9 ALLERGY HISTORY UNKNOWN: ICD-10-CM

## 2020-09-28 DIAGNOSIS — Z13.6 ENCOUNTER FOR LIPID SCREENING FOR CARDIOVASCULAR DISEASE: ICD-10-CM

## 2020-09-28 DIAGNOSIS — Z23 NEED FOR PNEUMOCOCCAL VACCINATION: ICD-10-CM

## 2020-09-28 DIAGNOSIS — E66.9 OBESITY (BMI 30-39.9): ICD-10-CM

## 2020-09-28 DIAGNOSIS — Z76.0 MEDICATION REFILL: ICD-10-CM

## 2020-09-28 DIAGNOSIS — Z00.00 ANNUAL PHYSICAL EXAM: Primary | ICD-10-CM

## 2020-09-28 DIAGNOSIS — Z13.220 ENCOUNTER FOR LIPID SCREENING FOR CARDIOVASCULAR DISEASE: ICD-10-CM

## 2020-09-28 PROCEDURE — 99999 PR PBB SHADOW E&M-EST. PATIENT-LVL IV: ICD-10-PCS | Mod: PBBFAC,,, | Performed by: FAMILY MEDICINE

## 2020-09-28 PROCEDURE — 90732 PPSV23 VACC 2 YRS+ SUBQ/IM: CPT | Mod: S$GLB,,, | Performed by: FAMILY MEDICINE

## 2020-09-28 PROCEDURE — 90732 PNEUMOCOCCAL POLYSACCHARIDE VACCINE 23-VALENT =>2YO SQ IM: ICD-10-PCS | Mod: S$GLB,,, | Performed by: FAMILY MEDICINE

## 2020-09-28 PROCEDURE — 99214 OFFICE O/P EST MOD 30 MIN: CPT | Mod: 25,S$GLB,, | Performed by: FAMILY MEDICINE

## 2020-09-28 PROCEDURE — 90471 PNEUMOCOCCAL POLYSACCHARIDE VACCINE 23-VALENT =>2YO SQ IM: ICD-10-PCS | Mod: S$GLB,,, | Performed by: FAMILY MEDICINE

## 2020-09-28 PROCEDURE — 99999 PR PBB SHADOW E&M-EST. PATIENT-LVL IV: CPT | Mod: PBBFAC,,, | Performed by: FAMILY MEDICINE

## 2020-09-28 PROCEDURE — 90471 IMMUNIZATION ADMIN: CPT | Mod: S$GLB,,, | Performed by: FAMILY MEDICINE

## 2020-09-28 PROCEDURE — 3008F BODY MASS INDEX DOCD: CPT | Mod: S$GLB,,, | Performed by: FAMILY MEDICINE

## 2020-09-28 PROCEDURE — 99214 PR OFFICE/OUTPT VISIT, EST, LEVL IV, 30-39 MIN: ICD-10-PCS | Mod: 25,S$GLB,, | Performed by: FAMILY MEDICINE

## 2020-09-28 PROCEDURE — 3008F PR BODY MASS INDEX (BMI) DOCUMENTED: ICD-10-PCS | Mod: S$GLB,,, | Performed by: FAMILY MEDICINE

## 2020-09-28 RX ORDER — OLMESARTAN MEDOXOMIL 5 MG/1
5 TABLET ORAL DAILY
Qty: 90 TABLET | Refills: 3 | Status: SHIPPED | OUTPATIENT
Start: 2020-09-28 | End: 2021-07-05

## 2020-09-28 RX ORDER — GUAIFENESIN, PSEUDOEPHEDRINE HYDROCHLORIDE 600; 60 MG/1; MG/1
1 TABLET, EXTENDED RELEASE ORAL
COMMUNITY
End: 2021-01-15

## 2020-09-28 NOTE — LETTER
October 6, 2020    Sondra Alves Devin  [address] [place] [address]             [place] [id number] [facility] [address]  Phone: [phone]  Fax: [phone] Dear Mrs. Beltran:    I have already faxed your lab work to Dr Barnett as discussed. I have attached your lab work to this for your records.      If you have any questions or concerns, please don't hesitate to call.    Sincerely,          Whitney Larios NP

## 2020-09-28 NOTE — PROGRESS NOTES
Subjective:       Patient ID: Sondra Beltran is a 50 y.o. female.    Chief Complaint: Annual Exam    This patient is new to me.  Mrs Amaro presents to the clinic today for her annual wellness exam. Patient works as a  for [employer]. Patient states she recently saw her allergist who wanted blood work done but she asked to wait so she could do it all at once. patient has a list of these labs she needs drawn on a form from Dr Barnett.   Patient requests a refill of her blood pressure medication today. Patient states her blood pressure has been good at home.   Patient has no complaints today.  Patient states she did have a sinus infection recently and just got off of antibiotics for this.  Plan of care discussed with patient, verbalized understanding.     Review of Systems   Constitutional: Negative for activity change, appetite change, chills, diaphoresis and fever.   HENT: Negative for congestion, ear pain, postnasal drip, sinus pressure, sneezing and sore throat.    Eyes: Negative for pain, discharge, redness and itching.   Respiratory: Negative for apnea, cough, chest tightness, shortness of breath and wheezing.    Cardiovascular: Negative for chest pain and leg swelling.   Gastrointestinal: Negative for abdominal distention, abdominal pain, constipation, diarrhea, nausea and vomiting.   Genitourinary: Negative for difficulty urinating, dysuria, flank pain and frequency.   Skin: Negative for color change, rash and wound.   Neurological: Negative for dizziness.       Patient Active Problem List   Diagnosis    Obesity (BMI 30-39.9)    Immune deficiency disorder    Borderline abnormal TFTs    Benign essential HTN    Thyroid nodule    Urethral diverticulum    Vaginal lesion    Severe persistent asthma without complication    Lung nodules       Objective:      Physical Exam  Vitals signs reviewed.   Constitutional:       General: She is not in acute  distress.     Appearance: Normal appearance. She is well-developed. She is obese.   HENT:      Head: Normocephalic.      Nose: Nose normal.   Eyes:      Conjunctiva/sclera: Conjunctivae normal.      Pupils: Pupils are equal, round, and reactive to light.   Neck:      Musculoskeletal: Normal range of motion and neck supple.   Cardiovascular:      Rate and Rhythm: Normal rate and regular rhythm.      Heart sounds: Normal heart sounds.   Pulmonary:      Effort: Pulmonary effort is normal. No respiratory distress.      Breath sounds: Normal breath sounds.   Abdominal:      General: Bowel sounds are normal. There is no distension.      Palpations: Abdomen is soft.      Tenderness: There is no abdominal tenderness.   Skin:     General: Skin is warm and dry.      Findings: No rash.   Neurological:      Mental Status: She is alert and oriented to person, place, and time.   Psychiatric:         Behavior: Behavior normal.         Lab Results   Component Value Date    WBC 4.00 05/29/2019    HGB 13.5 05/29/2019    HCT 39.9 05/29/2019     05/29/2019    CHOL 219 (H) 07/27/2018    TRIG 122 07/27/2018    HDL 48 07/27/2018    ALT 25 02/01/2019    AST 22 02/01/2019     05/29/2019    K 4.0 05/29/2019     05/29/2019    CREATININE 0.7 05/29/2019    BUN 8 05/29/2019    CO2 28 05/29/2019    TSH 0.765 07/27/2018     The 10-year ASCVD risk score (Lisandra PONCE Jr., et al., 2013) is: 2.3%    Values used to calculate the score:      Age: 50 years      Sex: Female      Is Non- : No      Diabetic: No      Tobacco smoker: No      Systolic Blood Pressure: 134 mmHg      Is BP treated: Yes      HDL Cholesterol: 48 mg/dL      Total Cholesterol: 219 mg/dL    Assessment:       1. Annual physical exam    2. Obesity (BMI 30-39.9)    3. Medication refill    4. Benign essential HTN    5. Encounter for lipid screening for cardiovascular disease    6. Allergy history unknown    7. Need for pneumococcal vaccination         Plan:       Sondra was seen today for annual exam.    Diagnoses and all orders for this visit:    Annual physical exam / Medication refill / Obesity (BMI 30-39.9)  -     CBC auto differential; Future  -     Comprehensive metabolic panel; Future  -     Lipid Panel; Future  -     Hemoglobin A1C; Future  -     TSH; Future    Benign essential HTN  -     olmesartan (BENICAR) 5 MG Tab; Take 1 tablet (5 mg total) by mouth once daily.  Stable  Continue medications as prescribed.  Follow up with PCP    Encounter for lipid screening for cardiovascular disease  -     Lipid Panel; Future    Allergy history unknown  -     IGG; Future  -     IGA; Future  -     IGM; Future  -     IGE; Future  -     Immunoglobulin G Subclass 4; Future  -     Streptococcus pneumoniae IgG Antibody (23 Serotypes), MAID; Future    Need for pneumococcal vaccination  -     Pneumococcal Polysaccharide Vaccine (23 Valent) (SQ/IM)      Follow up in about 1 year (around 9/28/2021), or if symptoms worsen or fail to improve.    Dr Maria Barnett (asthma, allergy, and immunology) Fax [phone] phone [phone]    Dr Cobb (uro)  Dr Berger (GYN)  Dr Joseph (pulm)  Dr Basurto (urogyn)  Dr Kay (derm)  Dr Huynh (rheumatology)  Dr Sigala (endo)  Dr Wong (podiatry)

## 2020-09-29 ENCOUNTER — LAB VISIT (OUTPATIENT)
Dept: LAB | Facility: HOSPITAL | Age: 50
End: 2020-09-29
Attending: FAMILY MEDICINE
Payer: COMMERCIAL

## 2020-09-29 DIAGNOSIS — Z13.6 ENCOUNTER FOR LIPID SCREENING FOR CARDIOVASCULAR DISEASE: ICD-10-CM

## 2020-09-29 DIAGNOSIS — Z13.220 ENCOUNTER FOR LIPID SCREENING FOR CARDIOVASCULAR DISEASE: ICD-10-CM

## 2020-09-29 DIAGNOSIS — Z00.00 ANNUAL PHYSICAL EXAM: ICD-10-CM

## 2020-09-29 DIAGNOSIS — E66.9 OBESITY (BMI 30-39.9): ICD-10-CM

## 2020-09-29 DIAGNOSIS — Z78.9 ALLERGY HISTORY UNKNOWN: ICD-10-CM

## 2020-09-29 LAB
ALBUMIN SERPL BCP-MCNC: 3.8 G/DL (ref 3.5–5.2)
ALP SERPL-CCNC: 91 U/L (ref 55–135)
ALT SERPL W/O P-5'-P-CCNC: 26 U/L (ref 10–44)
ANION GAP SERPL CALC-SCNC: 11 MMOL/L (ref 8–16)
AST SERPL-CCNC: 27 U/L (ref 10–40)
BASOPHILS # BLD AUTO: 0.01 K/UL (ref 0–0.2)
BASOPHILS NFR BLD: 0.2 % (ref 0–1.9)
BILIRUB SERPL-MCNC: 1.5 MG/DL (ref 0.1–1)
BUN SERPL-MCNC: 10 MG/DL (ref 6–20)
CALCIUM SERPL-MCNC: 9.4 MG/DL (ref 8.7–10.5)
CHLORIDE SERPL-SCNC: 102 MMOL/L (ref 95–110)
CHOLEST SERPL-MCNC: 211 MG/DL (ref 120–199)
CHOLEST/HDLC SERPL: 4 {RATIO} (ref 2–5)
CO2 SERPL-SCNC: 27 MMOL/L (ref 23–29)
CREAT SERPL-MCNC: 0.7 MG/DL (ref 0.5–1.4)
DIFFERENTIAL METHOD: NORMAL
EOSINOPHIL # BLD AUTO: 0 K/UL (ref 0–0.5)
EOSINOPHIL NFR BLD: 0.6 % (ref 0–8)
ERYTHROCYTE [DISTWIDTH] IN BLOOD BY AUTOMATED COUNT: 12.7 % (ref 11.5–14.5)
EST. GFR  (AFRICAN AMERICAN): >60 ML/MIN/1.73 M^2
EST. GFR  (NON AFRICAN AMERICAN): >60 ML/MIN/1.73 M^2
GLUCOSE SERPL-MCNC: 84 MG/DL (ref 70–110)
HCT VFR BLD AUTO: 41.7 % (ref 37–48.5)
HDLC SERPL-MCNC: 53 MG/DL (ref 40–75)
HDLC SERPL: 25.1 % (ref 20–50)
HGB BLD-MCNC: 13.5 G/DL (ref 12–16)
IGA SERPL-MCNC: 188 MG/DL (ref 40–350)
IGG SERPL-MCNC: 2107 MG/DL (ref 650–1600)
IGM SERPL-MCNC: 75 MG/DL (ref 50–300)
IMM GRANULOCYTES # BLD AUTO: 0.01 K/UL (ref 0–0.04)
IMM GRANULOCYTES NFR BLD AUTO: 0.2 % (ref 0–0.5)
LDLC SERPL CALC-MCNC: 133.6 MG/DL (ref 63–159)
LYMPHOCYTES # BLD AUTO: 1.8 K/UL (ref 1–4.8)
LYMPHOCYTES NFR BLD: 35.1 % (ref 18–48)
MCH RBC QN AUTO: 30.9 PG (ref 27–31)
MCHC RBC AUTO-ENTMCNC: 32.4 G/DL (ref 32–36)
MCV RBC AUTO: 95 FL (ref 82–98)
MONOCYTES # BLD AUTO: 0.4 K/UL (ref 0.3–1)
MONOCYTES NFR BLD: 7.2 % (ref 4–15)
NEUTROPHILS # BLD AUTO: 2.9 K/UL (ref 1.8–7.7)
NEUTROPHILS NFR BLD: 56.7 % (ref 38–73)
NONHDLC SERPL-MCNC: 158 MG/DL
NRBC BLD-RTO: 0 /100 WBC
PLATELET # BLD AUTO: 189 K/UL (ref 150–350)
PMV BLD AUTO: 12.1 FL (ref 9.2–12.9)
POTASSIUM SERPL-SCNC: 3.9 MMOL/L (ref 3.5–5.1)
PROT SERPL-MCNC: 8.4 G/DL (ref 6–8.4)
RBC # BLD AUTO: 4.37 M/UL (ref 4–5.4)
SODIUM SERPL-SCNC: 140 MMOL/L (ref 136–145)
TRIGL SERPL-MCNC: 122 MG/DL (ref 30–150)
TSH SERPL DL<=0.005 MIU/L-ACNC: 0.66 UIU/ML (ref 0.4–4)
WBC # BLD AUTO: 5.02 K/UL (ref 3.9–12.7)

## 2020-09-29 PROCEDURE — 82784 ASSAY IGA/IGD/IGG/IGM EACH: CPT

## 2020-09-29 PROCEDURE — 86317 IMMUNOASSAY INFECTIOUS AGENT: CPT | Mod: 59

## 2020-09-29 PROCEDURE — 82785 ASSAY OF IGE: CPT

## 2020-09-29 PROCEDURE — 80053 COMPREHEN METABOLIC PANEL: CPT

## 2020-09-29 PROCEDURE — 82784 ASSAY IGA/IGD/IGG/IGM EACH: CPT | Mod: 59

## 2020-09-29 PROCEDURE — 85025 COMPLETE CBC W/AUTO DIFF WBC: CPT

## 2020-09-29 PROCEDURE — 84443 ASSAY THYROID STIM HORMONE: CPT

## 2020-09-29 PROCEDURE — 80061 LIPID PANEL: CPT

## 2020-09-29 PROCEDURE — 83036 HEMOGLOBIN GLYCOSYLATED A1C: CPT

## 2020-09-29 PROCEDURE — 82787 IGG 1 2 3 OR 4 EACH: CPT

## 2020-09-29 PROCEDURE — 36415 COLL VENOUS BLD VENIPUNCTURE: CPT | Mod: PO

## 2020-09-30 LAB
ESTIMATED AVG GLUCOSE: 91 MG/DL (ref 68–131)
HBA1C MFR BLD HPLC: 4.8 % (ref 4–5.6)
IGE SERPL-ACNC: <35 IU/ML (ref 0–100)

## 2020-10-02 LAB — IGG4 SER-MCNC: 17 MG/DL (ref 4–86)

## 2020-10-05 LAB
S PNEUM DA 1 IGG SER-MCNC: 19.5 MCG/ML
S PNEUM DA 10A IGG SER-MCNC: 14 MCG/ML
S PNEUM DA 11A IGG SER-MCNC: 6.2 MCG/ML
S PNEUM DA 12F IGG SER-MCNC: 3.5 MCG/ML
S PNEUM DA 14 IGG SER-MCNC: 11.4 MCG/ML
S PNEUM DA 15B IGG SER-MCNC: 13.9 MCG/ML
S PNEUM DA 17F IGG SER-MCNC: 23 MCG/ML
S PNEUM DA 18C IGG SER-MCNC: 6.4 MCG/ML
S PNEUM DA 19A IGG SER-MCNC: 48 MCG/ML
S PNEUM DA 2 IGG SER-MCNC: 11.8 MCG/ML
S PNEUM DA 20A IGG SER-MCNC: 12.9 MCG/ML
S PNEUM DA 22F IGG SER-MCNC: 48.9 MCG/ML
S PNEUM DA 23F IGG SER-MCNC: 75 MCG/ML
S PNEUM DA 3 IGG SER-MCNC: 13.3 MCG/ML
S PNEUM DA 33F IGG SER-MCNC: 7.7 MCG/ML
S PNEUM DA 4 IGG SER-MCNC: 3.4 MCG/ML
S PNEUM DA 5 IGG SER-MCNC: 13.5 MCG/ML
S PNEUM DA 6B IGG SER-MCNC: 17.5 MCG/ML
S PNEUM DA 7F IGG SER-MCNC: 17.7 MCG/ML
S PNEUM DA 8 IGG SER-MCNC: 11.9 MCG/ML
S PNEUM DA 9N IGG SER-MCNC: NORMAL UG/ML
S PNEUM DA 9V IGG SER-MCNC: 10 MCG/ML
S.PNEUMONIAE TYPE 19F: 26.1 MCG/ML

## 2020-10-07 ENCOUNTER — PATIENT MESSAGE (OUTPATIENT)
Dept: FAMILY MEDICINE | Facility: CLINIC | Age: 50
End: 2020-10-07

## 2020-10-21 DIAGNOSIS — E04.2 NONTOXIC MULTINODULAR GOITER: Primary | ICD-10-CM

## 2020-11-04 ENCOUNTER — HOSPITAL ENCOUNTER (OUTPATIENT)
Dept: RADIOLOGY | Facility: HOSPITAL | Age: 50
Discharge: HOME | End: 2020-11-04
Attending: INTERNAL MEDICINE
Payer: COMMERCIAL

## 2020-11-04 DIAGNOSIS — E04.2 NONTOXIC MULTINODULAR GOITER: ICD-10-CM

## 2020-11-04 PROCEDURE — 76536 US EXAM OF HEAD AND NECK: CPT | Mod: TC,PO

## 2020-12-01 ENCOUNTER — OFFICE VISIT (OUTPATIENT)
Dept: ORTHOPEDICS | Facility: CLINIC | Age: 50
End: 2020-12-01
Payer: COMMERCIAL

## 2020-12-01 VITALS
SYSTOLIC BLOOD PRESSURE: 140 MMHG | DIASTOLIC BLOOD PRESSURE: 76 MMHG | HEART RATE: 87 BPM | BODY MASS INDEX: 34.83 KG/M2 | WEIGHT: 204 LBS | HEIGHT: 64 IN

## 2020-12-01 DIAGNOSIS — G89.29 CHRONIC PAIN OF LEFT KNEE: ICD-10-CM

## 2020-12-01 DIAGNOSIS — M25.562 CHRONIC PAIN OF LEFT KNEE: ICD-10-CM

## 2020-12-01 DIAGNOSIS — M17.12 PRIMARY OSTEOARTHRITIS OF LEFT KNEE: Primary | ICD-10-CM

## 2020-12-01 PROCEDURE — 99203 PR OFFICE/OUTPT VISIT, NEW, LEVL III, 30-44 MIN: ICD-10-PCS | Mod: 25,S$GLB,, | Performed by: ORTHOPAEDIC SURGERY

## 2020-12-01 PROCEDURE — 99203 OFFICE O/P NEW LOW 30 MIN: CPT | Mod: 25,S$GLB,, | Performed by: ORTHOPAEDIC SURGERY

## 2020-12-01 PROCEDURE — 20610 DRAIN/INJ JOINT/BURSA W/O US: CPT | Mod: LT,S$GLB,, | Performed by: ORTHOPAEDIC SURGERY

## 2020-12-01 PROCEDURE — 20610 LARGE JOINT ASPIRATION/INJECTION: L KNEE: ICD-10-PCS | Mod: LT,S$GLB,, | Performed by: ORTHOPAEDIC SURGERY

## 2020-12-01 RX ORDER — METHYLPREDNISOLONE ACETATE 40 MG/ML
40 INJECTION, SUSPENSION INTRA-ARTICULAR; INTRALESIONAL; INTRAMUSCULAR; SOFT TISSUE
Status: DISCONTINUED | OUTPATIENT
Start: 2020-12-01 | End: 2020-12-01 | Stop reason: HOSPADM

## 2020-12-01 RX ADMIN — METHYLPREDNISOLONE ACETATE 40 MG: 40 INJECTION, SUSPENSION INTRA-ARTICULAR; INTRALESIONAL; INTRAMUSCULAR; SOFT TISSUE at 10:12

## 2020-12-01 NOTE — PROCEDURES
Large Joint Aspiration/Injection: L knee    Date/Time: 12/1/2020 10:00 AM  Performed by: Lacho Sheets MD  Authorized by: Lacho Sheets MD     Consent Done?:  Yes (Verbal)  Indications:  Pain  Site marked: the procedure site was marked    Timeout: prior to procedure the correct patient, procedure, and site was verified    Prep: patient was prepped and draped in usual sterile fashion      Local anesthesia used?: Yes    Local anesthetic:  Lidocaine 1% without epinephrine    Details:  Needle Size:  25 G  Ultrasonic Guidance for needle placement?: No    Location:  Knee  Site:  L knee  Medications:  40 mg methylPREDNISolone acetate 40 mg/mL  Patient tolerance:  Patient tolerated the procedure well with no immediate complications

## 2020-12-01 NOTE — PROGRESS NOTES
[facility]    Subjective:     Chief Complaint:   Chief Complaint   Patient presents with    Left Knee - Pain     Left knee pain for few years, c/o pain, no swelling, pops at times, gives way       Past Medical History:   Diagnosis Date    Asthma     Benign essential HTN 6/19/2017    CVID (common variable immunodeficiency)     Neck strain 5/17/2016    Thyroid nodule 6/28/2017       Past Surgical History:   Procedure Laterality Date    cervial cone surgery      CLOSURE OF VESICOVAGINAL FISTULA N/A 6/6/2019    Procedure: excision of vaginal lesion vs urethral diverticulectomy;  Surgeon: Sondra Cobb MD;  Location: [facility] OR;  Service: Urology;  Laterality: N/A;   to assist, first case, please put  as co-surgeon    CYSTOSCOPY N/A 4/22/2019    Procedure: CYSTOSCOPY;  Surgeon: Sondra Cobb MD;  Location: [facility] OR;  Service: Urology;  Laterality: N/A;    HYSTERECTOMY      KNEE SURGERY      nose surgery      VAGINOSCOPY N/A 4/22/2019    Procedure: VAGINOSCOPY;  Surgeon: Sondra Cobb MD;  Location: [facility] OR;  Service: Urology;  Laterality: N/A;       Current Outpatient Medications   Medication Sig    albuterol (PROVENTIL) 2.5 mg /3 mL (0.083 %) nebulizer solution     ascorbic acid (VITAMIN C) 500 MG tablet Take 500 mg by mouth 3 (three) times daily.    budesonide-formoterol 160-4.5 mcg (SYMBICORT) 160-4.5 mcg/actuation HFAA Inhale 2 puffs into the lungs every 12 (twelve) hours. Controller    cetirizine (ZYRTEC) 10 MG tablet Take 10 mg by mouth once daily.    cyanocobalamin 1,000 mcg/mL injection INJECT 1 ML (1,000 MCG TOTAL) INTO THE SKIN EVERY 7 DAYS.    fexofenadine HCl (ALLEGRA ORAL) Take by mouth.    fluticasone propionate (FLONASE) 50 mcg/actuation nasal spray USE 2 SPRAY(S) IN EACH NOSTRIL TWICE DAILY    IMMUNE GLOB,WENCESLAO CAPRYLATE,IGG, (GAMUNEX IV) Inject into the vein every 21 days.     levocetirizine (XYZAL) 5 MG tablet Take 5 mg by  mouth every evening.    montelukast (SINGULAIR) 10 mg tablet Take 1 tablet (10 mg total) by mouth 2 (two) times daily.    olmesartan (BENICAR) 5 MG Tab Take 1 tablet (5 mg total) by mouth once daily.    predniSONE (DELTASONE) 10 MG tablet TAKE 3 TABLETS BY MOUTH ONCE DAILY FOR 3 DAYS THEN TAKE 2 TABLETS ONCE DAILY FOR 3 DAYS THEN 1 TABLET ONCE DAILY FOR 3 DAYS    PROAIR HFA 90 mcg/actuation inhaler Inhale 2 puffs into the lungs every 4 (four) hours as needed for Wheezing.    pseudoephedrine-guaifenesin  mg (MUCINEX D)  mg per tablet Take 1 tablet by mouth every 12 (twelve) hours.    selenium 50 mcg Tab Take 200 mcg by mouth once daily.    tiotropium bromide (SPIRIVA RESPIMAT) 1.25 mcg/actuation Mist Inhale 2.5 mcg into the lungs 2 (two) times daily. Controller    vitamin D 1000 units Tab Take 185 mg by mouth once daily.     No current facility-administered medications for this visit.        Review of patient's allergies indicates:   Allergen Reactions    Bactrim [sulfamethoxazole-trimethoprim] Rash       Family History   Problem Relation Age of Onset    Diabetes Mother     Hypertension Mother     Heart disease Mother     Rheum arthritis Mother     Heart disease Father     Hypertension Father        Social History     Socioeconomic History    Marital status:      Spouse name: Not on file    Number of children: Not on file    Years of education: Not on file    Highest education level: Not on file   Occupational History    Not on file   Social Needs    Financial resource strain: Not on file    Food insecurity     Worry: Not on file     Inability: Not on file    Transportation needs     Medical: Not on file     Non-medical: Not on file   Tobacco Use    Smoking status: Never Smoker    Smokeless tobacco: Never Used   Substance and Sexual Activity    Alcohol use: No    Drug use: No    Sexual activity: Not on file   Lifestyle    Physical activity     Days per week: Not on file      Minutes per session: Not on file    Stress: Not on file   Relationships    Social connections     Talks on phone: Not on file     Gets together: Not on file     Attends Judaism service: Not on file     Active member of club or organization: Not on file     Attends meetings of clubs or organizations: Not on file     Relationship status: Not on file   Other Topics Concern    Not on file   Social History Narrative    Not on file       History of present illness:  Patient returns to clinic today for her left knee, she is a former patient of Dr. Sheets he repair her ACL on the right side back in 2001.  She was a long-time patient, due to insurance changes he had to seek care elsewhere.  She states that she received viscosupplementation for a long time, this was also discontinued by her insurance.  She now returns to [facility] care.  Insidious onset left knee pain, chronic.      Review of Systems:    Constitution: Negative for chills, fever, and sweats.  Negative for unexplained weight loss.    HENT:  Negative for headaches and blurry vision.    Cardiovascular:Negative for chest pain or irregular heart beat. Negative for hypertension.    Respiratory:  Negative for cough and shortness of breath.    Gastrointestinal: Negative for abdominal pain, heartburn, melena, nausea, and vomitting.    Genitourinary:  Negative bladder incontinence and dysuria.    Musculoskeletal:  See HPI for details.     Neurological: Negative for numbness.    Psychiatric/Behavioral: Negative for depression.  The patient is not nervous/anxious.      Endocrine: Negative for polyuria    Hematologic/Lymphatic: Negative for bleeding problem.  Does not bruise/bleed easily.    Skin: Negative for poor would healing and rash    Objective:      Physical Examination:    Vital Signs:    Vitals:    12/01/20 1016   BP: (!) 140/76   Pulse: 87       Body mass index is 35.02 kg/m².    This a well-developed, well nourished patient in no acute distress.  They  "are alert and oriented and cooperative to examination.        Examination of the bilateral knees, the patient has no effusions, mild patellofemoral crepitus bilaterally, some mild medial joint line pain on the left.  Negative Tisha's testing no click or pop is noted.  Stable to anterior posterior varus and valgus stresses with symmetrical range of motion 0-130 degrees bilaterally.    Pertinent New Results:    XRAY Report / Interpretation:   AP lateral sunrise views of the left knee taken today in the office demonstrates moderate to severe tricompartmental arthritis,    Assessment/Plan:      Osteoarthritis left knee, we injected the left knee today with lidocaine and Depo-Medrol.  Will attempt to get authorization for viscosupplementation again is patient responded well to this before in the past.  Will see her back when this is available.    Justin Keenan PA-C served as a scribe for this patient encounter. A "face to face" encounter occured with Dr. Lacho Sheets on this date. The treatment plan and medical decision making are outlined as above:      This note was created using Dragon voice recognition software that occasionally misinterpreted phrases or words.          "

## 2021-01-14 DIAGNOSIS — Z12.31 OTHER SCREENING MAMMOGRAM: ICD-10-CM

## 2021-01-15 ENCOUNTER — HOSPITAL ENCOUNTER (OUTPATIENT)
Dept: RADIOLOGY | Facility: CLINIC | Age: 51
Discharge: HOME | End: 2021-01-15
Attending: NURSE PRACTITIONER
Payer: COMMERCIAL

## 2021-01-15 ENCOUNTER — OFFICE VISIT (OUTPATIENT)
Dept: FAMILY MEDICINE | Facility: CLINIC | Age: 51
End: 2021-01-15
Payer: COMMERCIAL

## 2021-01-15 ENCOUNTER — TELEPHONE (OUTPATIENT)
Dept: FAMILY MEDICINE | Facility: CLINIC | Age: 51
End: 2021-01-15

## 2021-01-15 VITALS
HEART RATE: 90 BPM | OXYGEN SATURATION: 96 % | HEIGHT: 64 IN | BODY MASS INDEX: 34.21 KG/M2 | WEIGHT: 200.38 LBS | TEMPERATURE: 98 F | DIASTOLIC BLOOD PRESSURE: 74 MMHG | SYSTOLIC BLOOD PRESSURE: 126 MMHG

## 2021-01-15 DIAGNOSIS — M79.672 LEFT FOOT PAIN: ICD-10-CM

## 2021-01-15 DIAGNOSIS — M79.672 LEFT FOOT PAIN: Primary | ICD-10-CM

## 2021-01-15 PROCEDURE — 1125F PR PAIN SEVERITY QUANTIFIED, PAIN PRESENT: ICD-10-PCS | Mod: S$GLB,,, | Performed by: NURSE PRACTITIONER

## 2021-01-15 PROCEDURE — 73630 X-RAY EXAM OF FOOT: CPT | Mod: 26,LT,S$GLB, | Performed by: RADIOLOGY

## 2021-01-15 PROCEDURE — 99214 OFFICE O/P EST MOD 30 MIN: CPT | Mod: S$GLB,,, | Performed by: NURSE PRACTITIONER

## 2021-01-15 PROCEDURE — 73630 XR FOOT COMPLETE 3 VIEW LEFT: ICD-10-PCS | Mod: 26,LT,S$GLB, | Performed by: RADIOLOGY

## 2021-01-15 PROCEDURE — 99999 PR PBB SHADOW E&M-EST. PATIENT-LVL III: CPT | Mod: PBBFAC,,, | Performed by: NURSE PRACTITIONER

## 2021-01-15 PROCEDURE — 99214 PR OFFICE/OUTPT VISIT, EST, LEVL IV, 30-39 MIN: ICD-10-PCS | Mod: S$GLB,,, | Performed by: NURSE PRACTITIONER

## 2021-01-15 PROCEDURE — 1125F AMNT PAIN NOTED PAIN PRSNT: CPT | Mod: S$GLB,,, | Performed by: NURSE PRACTITIONER

## 2021-01-15 PROCEDURE — 3008F BODY MASS INDEX DOCD: CPT | Mod: S$GLB,,, | Performed by: NURSE PRACTITIONER

## 2021-01-15 PROCEDURE — 99999 PR PBB SHADOW E&M-EST. PATIENT-LVL III: ICD-10-PCS | Mod: PBBFAC,,, | Performed by: NURSE PRACTITIONER

## 2021-01-15 PROCEDURE — 3008F PR BODY MASS INDEX (BMI) DOCUMENTED: ICD-10-PCS | Mod: S$GLB,,, | Performed by: NURSE PRACTITIONER

## 2021-01-15 PROCEDURE — 73630 X-RAY EXAM OF FOOT: CPT | Mod: TC,FY,PO,LT

## 2021-01-15 RX ORDER — NAPROXEN 375 MG/1
375 TABLET ORAL 2 TIMES DAILY WITH MEALS
Qty: 30 TABLET | Refills: 0 | Status: SHIPPED | OUTPATIENT
Start: 2021-01-15 | End: 2021-09-30

## 2021-01-25 ENCOUNTER — PATIENT MESSAGE (OUTPATIENT)
Dept: FAMILY MEDICINE | Facility: CLINIC | Age: 51
End: 2021-01-25

## 2021-01-25 ENCOUNTER — HOSPITAL ENCOUNTER (OUTPATIENT)
Dept: RADIOLOGY | Facility: HOSPITAL | Age: 51
Discharge: HOME | End: 2021-01-25
Attending: NURSE PRACTITIONER
Payer: COMMERCIAL

## 2021-01-25 DIAGNOSIS — M79.672 LEFT FOOT PAIN: Primary | ICD-10-CM

## 2021-01-25 DIAGNOSIS — M79.672 LEFT FOOT PAIN: ICD-10-CM

## 2021-01-25 PROCEDURE — 73718 MRI LOWER EXTREMITY W/O DYE: CPT | Mod: TC,LT

## 2021-01-25 PROCEDURE — 73718 MRI FOOT (MIDFOOT) LEFT WITHOUT CONTRAST: ICD-10-PCS | Mod: 26,LT,, | Performed by: RADIOLOGY

## 2021-01-25 PROCEDURE — 73718 MRI LOWER EXTREMITY W/O DYE: CPT | Mod: 26,LT,, | Performed by: RADIOLOGY

## 2021-01-28 ENCOUNTER — OFFICE VISIT (OUTPATIENT)
Dept: PODIATRY | Facility: CLINIC | Age: 51
End: 2021-01-28
Payer: COMMERCIAL

## 2021-01-28 VITALS — WEIGHT: 200 LBS | BODY MASS INDEX: 34.15 KG/M2 | HEIGHT: 64 IN

## 2021-01-28 DIAGNOSIS — M79.672 LEFT FOOT PAIN: ICD-10-CM

## 2021-01-28 DIAGNOSIS — M84.375A STRESS FRACTURE, LEFT FOOT, INITIAL ENCOUNTER FOR FRACTURE: Primary | ICD-10-CM

## 2021-01-28 DIAGNOSIS — M25.475 SWELLING OF FOOT JOINT, LEFT: ICD-10-CM

## 2021-01-28 PROCEDURE — 99203 PR OFFICE/OUTPT VISIT, NEW, LEVL III, 30-44 MIN: ICD-10-PCS | Mod: S$GLB,,, | Performed by: PODIATRIST

## 2021-01-28 PROCEDURE — 3008F PR BODY MASS INDEX (BMI) DOCUMENTED: ICD-10-PCS | Mod: S$GLB,,, | Performed by: PODIATRIST

## 2021-01-28 PROCEDURE — 1125F PR PAIN SEVERITY QUANTIFIED, PAIN PRESENT: ICD-10-PCS | Mod: S$GLB,,, | Performed by: PODIATRIST

## 2021-01-28 PROCEDURE — 1125F AMNT PAIN NOTED PAIN PRSNT: CPT | Mod: S$GLB,,, | Performed by: PODIATRIST

## 2021-01-28 PROCEDURE — 99203 OFFICE O/P NEW LOW 30 MIN: CPT | Mod: S$GLB,,, | Performed by: PODIATRIST

## 2021-01-28 PROCEDURE — 3008F BODY MASS INDEX DOCD: CPT | Mod: S$GLB,,, | Performed by: PODIATRIST

## 2021-02-05 ENCOUNTER — HOSPITAL ENCOUNTER (OUTPATIENT)
Dept: RADIOLOGY | Facility: HOSPITAL | Age: 51
Discharge: HOME | End: 2021-02-05
Attending: FAMILY MEDICINE
Payer: COMMERCIAL

## 2021-02-05 DIAGNOSIS — Z12.31 OTHER SCREENING MAMMOGRAM: ICD-10-CM

## 2021-02-05 PROCEDURE — 77067 SCR MAMMO BI INCL CAD: CPT | Mod: TC,PO

## 2021-03-11 ENCOUNTER — HOSPITAL ENCOUNTER (OUTPATIENT)
Dept: RADIOLOGY | Facility: CLINIC | Age: 51
Discharge: HOME | End: 2021-03-11
Attending: PODIATRIST
Payer: COMMERCIAL

## 2021-03-11 ENCOUNTER — OFFICE VISIT (OUTPATIENT)
Dept: PODIATRY | Facility: CLINIC | Age: 51
End: 2021-03-11
Payer: COMMERCIAL

## 2021-03-11 VITALS — BODY MASS INDEX: 34.15 KG/M2 | HEIGHT: 64 IN | WEIGHT: 200 LBS

## 2021-03-11 DIAGNOSIS — M84.375D STRESS FRACTURE OF LEFT FOOT WITH ROUTINE HEALING: ICD-10-CM

## 2021-03-11 DIAGNOSIS — M79.672 LEFT FOOT PAIN: Primary | ICD-10-CM

## 2021-03-11 DIAGNOSIS — M25.475 SWELLING OF FOOT JOINT, LEFT: ICD-10-CM

## 2021-03-11 PROCEDURE — 73630 X-RAY EXAM OF FOOT: CPT | Mod: LT,S$GLB,, | Performed by: RADIOLOGY

## 2021-03-11 PROCEDURE — 99213 OFFICE O/P EST LOW 20 MIN: CPT | Mod: S$GLB,,, | Performed by: PODIATRIST

## 2021-03-11 PROCEDURE — 1125F PR PAIN SEVERITY QUANTIFIED, PAIN PRESENT: ICD-10-PCS | Mod: S$GLB,,, | Performed by: PODIATRIST

## 2021-03-11 PROCEDURE — 3008F BODY MASS INDEX DOCD: CPT | Mod: S$GLB,,, | Performed by: PODIATRIST

## 2021-03-11 PROCEDURE — 99213 PR OFFICE/OUTPT VISIT, EST, LEVL III, 20-29 MIN: ICD-10-PCS | Mod: S$GLB,,, | Performed by: PODIATRIST

## 2021-03-11 PROCEDURE — 73630 XR FOOT COMPLETE 3 VIEW LEFT: ICD-10-PCS | Mod: LT,S$GLB,, | Performed by: RADIOLOGY

## 2021-03-11 PROCEDURE — 3008F PR BODY MASS INDEX (BMI) DOCUMENTED: ICD-10-PCS | Mod: S$GLB,,, | Performed by: PODIATRIST

## 2021-03-11 PROCEDURE — 1125F AMNT PAIN NOTED PAIN PRSNT: CPT | Mod: S$GLB,,, | Performed by: PODIATRIST

## 2021-03-11 RX ORDER — EPINEPHRINE 0.3 MG/.3ML
INJECTION SUBCUTANEOUS
COMMUNITY
Start: 2021-03-08 | End: 2022-11-02

## 2021-03-31 ENCOUNTER — PATIENT MESSAGE (OUTPATIENT)
Dept: FAMILY MEDICINE | Facility: CLINIC | Age: 51
End: 2021-03-31

## 2021-04-13 ENCOUNTER — OFFICE VISIT (OUTPATIENT)
Dept: PODIATRY | Facility: CLINIC | Age: 51
End: 2021-04-13
Payer: COMMERCIAL

## 2021-04-13 ENCOUNTER — HOSPITAL ENCOUNTER (OUTPATIENT)
Dept: RADIOLOGY | Facility: CLINIC | Age: 51
Discharge: HOME | End: 2021-04-13
Attending: PODIATRIST
Payer: COMMERCIAL

## 2021-04-13 VITALS
WEIGHT: 200 LBS | OXYGEN SATURATION: 97 % | HEART RATE: 72 BPM | RESPIRATION RATE: 16 BRPM | BODY MASS INDEX: 34.15 KG/M2 | DIASTOLIC BLOOD PRESSURE: 77 MMHG | HEIGHT: 64 IN | SYSTOLIC BLOOD PRESSURE: 114 MMHG

## 2021-04-13 DIAGNOSIS — M84.375G: ICD-10-CM

## 2021-04-13 DIAGNOSIS — M79.672 LEFT FOOT PAIN: Primary | ICD-10-CM

## 2021-04-13 PROCEDURE — 1125F PR PAIN SEVERITY QUANTIFIED, PAIN PRESENT: ICD-10-PCS | Mod: S$GLB,,, | Performed by: PODIATRIST

## 2021-04-13 PROCEDURE — 99213 PR OFFICE/OUTPT VISIT, EST, LEVL III, 20-29 MIN: ICD-10-PCS | Mod: S$GLB,,, | Performed by: PODIATRIST

## 2021-04-13 PROCEDURE — 1125F AMNT PAIN NOTED PAIN PRSNT: CPT | Mod: S$GLB,,, | Performed by: PODIATRIST

## 2021-04-13 PROCEDURE — 73630 XR FOOT COMPLETE 3 VIEW LEFT: ICD-10-PCS | Mod: LT,S$GLB,, | Performed by: RADIOLOGY

## 2021-04-13 PROCEDURE — 3008F PR BODY MASS INDEX (BMI) DOCUMENTED: ICD-10-PCS | Mod: S$GLB,,, | Performed by: PODIATRIST

## 2021-04-13 PROCEDURE — 3008F BODY MASS INDEX DOCD: CPT | Mod: S$GLB,,, | Performed by: PODIATRIST

## 2021-04-13 PROCEDURE — 99213 OFFICE O/P EST LOW 20 MIN: CPT | Mod: S$GLB,,, | Performed by: PODIATRIST

## 2021-04-13 PROCEDURE — 73630 X-RAY EXAM OF FOOT: CPT | Mod: LT,S$GLB,, | Performed by: RADIOLOGY

## 2021-05-12 ENCOUNTER — HOSPITAL ENCOUNTER (OUTPATIENT)
Dept: RADIOLOGY | Facility: CLINIC | Age: 51
Discharge: HOME | End: 2021-05-12
Attending: PODIATRIST
Payer: COMMERCIAL

## 2021-05-12 ENCOUNTER — OFFICE VISIT (OUTPATIENT)
Dept: PODIATRY | Facility: CLINIC | Age: 51
End: 2021-05-12
Payer: COMMERCIAL

## 2021-05-12 VITALS
BODY MASS INDEX: 34.15 KG/M2 | DIASTOLIC BLOOD PRESSURE: 75 MMHG | WEIGHT: 200 LBS | HEIGHT: 64 IN | HEART RATE: 70 BPM | SYSTOLIC BLOOD PRESSURE: 116 MMHG

## 2021-05-12 DIAGNOSIS — S92.215K: Primary | ICD-10-CM

## 2021-05-12 DIAGNOSIS — M79.672 LEFT FOOT PAIN: ICD-10-CM

## 2021-05-12 DIAGNOSIS — M84.375G: ICD-10-CM

## 2021-05-12 PROCEDURE — 73630 XR FOOT COMPLETE 3 VIEW LEFT: ICD-10-PCS | Mod: LT,S$GLB,, | Performed by: RADIOLOGY

## 2021-05-12 PROCEDURE — 3008F BODY MASS INDEX DOCD: CPT | Mod: S$GLB,,, | Performed by: PODIATRIST

## 2021-05-12 PROCEDURE — 73630 X-RAY EXAM OF FOOT: CPT | Mod: LT,S$GLB,, | Performed by: RADIOLOGY

## 2021-05-12 PROCEDURE — 99213 PR OFFICE/OUTPT VISIT, EST, LEVL III, 20-29 MIN: ICD-10-PCS | Mod: S$GLB,,, | Performed by: PODIATRIST

## 2021-05-12 PROCEDURE — 3008F PR BODY MASS INDEX (BMI) DOCUMENTED: ICD-10-PCS | Mod: S$GLB,,, | Performed by: PODIATRIST

## 2021-05-12 PROCEDURE — 99213 OFFICE O/P EST LOW 20 MIN: CPT | Mod: S$GLB,,, | Performed by: PODIATRIST

## 2021-06-08 ENCOUNTER — PATIENT OUTREACH (OUTPATIENT)
Dept: ADMINISTRATIVE | Facility: HOSPITAL | Age: 51
End: 2021-06-08

## 2021-07-05 DIAGNOSIS — I10 BENIGN ESSENTIAL HTN: ICD-10-CM

## 2021-07-05 RX ORDER — OLMESARTAN MEDOXOMIL 5 MG/1
TABLET ORAL
Qty: 30 TABLET | Refills: 1 | Status: SHIPPED | OUTPATIENT
Start: 2021-07-05 | End: 2021-07-19 | Stop reason: SDUPTHER

## 2021-07-19 DIAGNOSIS — I10 BENIGN ESSENTIAL HTN: ICD-10-CM

## 2021-07-19 RX ORDER — OLMESARTAN MEDOXOMIL 5 MG/1
5 TABLET ORAL DAILY
Qty: 90 TABLET | Refills: 0 | Status: SHIPPED | OUTPATIENT
Start: 2021-07-19 | End: 2021-09-30 | Stop reason: SDUPTHER

## 2021-09-01 RX ORDER — TIOTROPIUM BROMIDE INHALATION SPRAY 1.56 UG/1
2.5 SPRAY, METERED RESPIRATORY (INHALATION) DAILY
Qty: 4 G | Refills: 0 | Status: SHIPPED | OUTPATIENT
Start: 2021-09-01 | End: 2022-11-02 | Stop reason: SDUPTHER

## 2021-09-14 ENCOUNTER — TELEPHONE (OUTPATIENT)
Dept: FAMILY MEDICINE | Facility: CLINIC | Age: 51
End: 2021-09-14

## 2021-09-14 DIAGNOSIS — B86 SCABIES: Primary | ICD-10-CM

## 2021-09-14 RX ORDER — PERMETHRIN 50 MG/G
CREAM TOPICAL ONCE
Qty: 60 G | Refills: 0 | Status: SHIPPED | OUTPATIENT
Start: 2021-09-14 | End: 2021-09-14

## 2021-09-30 ENCOUNTER — OFFICE VISIT (OUTPATIENT)
Dept: FAMILY MEDICINE | Facility: CLINIC | Age: 51
End: 2021-09-30
Payer: COMMERCIAL

## 2021-09-30 VITALS
WEIGHT: 188.06 LBS | HEIGHT: 64 IN | OXYGEN SATURATION: 98 % | BODY MASS INDEX: 32.11 KG/M2 | SYSTOLIC BLOOD PRESSURE: 120 MMHG | HEART RATE: 69 BPM | RESPIRATION RATE: 20 BRPM | DIASTOLIC BLOOD PRESSURE: 68 MMHG | TEMPERATURE: 99 F

## 2021-09-30 DIAGNOSIS — R06.83 SNORES: ICD-10-CM

## 2021-09-30 DIAGNOSIS — Z11.59 NEED FOR HEPATITIS C SCREENING TEST: ICD-10-CM

## 2021-09-30 DIAGNOSIS — Z13.6 ENCOUNTER FOR LIPID SCREENING FOR CARDIOVASCULAR DISEASE: ICD-10-CM

## 2021-09-30 DIAGNOSIS — Z23 IMMUNIZATION DUE: ICD-10-CM

## 2021-09-30 DIAGNOSIS — J45.50 SEVERE PERSISTENT ASTHMA WITHOUT COMPLICATION: ICD-10-CM

## 2021-09-30 DIAGNOSIS — Z00.00 WELLNESS EXAMINATION: Primary | ICD-10-CM

## 2021-09-30 DIAGNOSIS — I10 BENIGN ESSENTIAL HTN: ICD-10-CM

## 2021-09-30 DIAGNOSIS — Z13.220 ENCOUNTER FOR LIPID SCREENING FOR CARDIOVASCULAR DISEASE: ICD-10-CM

## 2021-09-30 DIAGNOSIS — R53.83 FATIGUE, UNSPECIFIED TYPE: ICD-10-CM

## 2021-09-30 DIAGNOSIS — Z13.1 SCREENING FOR DIABETES MELLITUS: ICD-10-CM

## 2021-09-30 DIAGNOSIS — B07.0 PLANTAR WART: ICD-10-CM

## 2021-09-30 DIAGNOSIS — F41.9 ANXIETY: ICD-10-CM

## 2021-09-30 PROCEDURE — 3078F PR MOST RECENT DIASTOLIC BLOOD PRESSURE < 80 MM HG: ICD-10-PCS | Mod: S$GLB,,, | Performed by: FAMILY MEDICINE

## 2021-09-30 PROCEDURE — 90686 IIV4 VACC NO PRSV 0.5 ML IM: CPT | Mod: S$GLB,,, | Performed by: FAMILY MEDICINE

## 2021-09-30 PROCEDURE — 99396 PREV VISIT EST AGE 40-64: CPT | Mod: 25,S$GLB,, | Performed by: FAMILY MEDICINE

## 2021-09-30 PROCEDURE — 90471 FLU VACCINE (QUAD) GREATER THAN OR EQUAL TO 3YO PRESERVATIVE FREE IM: ICD-10-PCS | Mod: S$GLB,,, | Performed by: FAMILY MEDICINE

## 2021-09-30 PROCEDURE — 99396 PR PREVENTIVE VISIT,EST,40-64: ICD-10-PCS | Mod: 25,S$GLB,, | Performed by: FAMILY MEDICINE

## 2021-09-30 PROCEDURE — 90686 FLU VACCINE (QUAD) GREATER THAN OR EQUAL TO 3YO PRESERVATIVE FREE IM: ICD-10-PCS | Mod: S$GLB,,, | Performed by: FAMILY MEDICINE

## 2021-09-30 PROCEDURE — 1159F MED LIST DOCD IN RCRD: CPT | Mod: S$GLB,,, | Performed by: FAMILY MEDICINE

## 2021-09-30 PROCEDURE — 4010F ACE/ARB THERAPY RXD/TAKEN: CPT | Mod: S$GLB,,, | Performed by: FAMILY MEDICINE

## 2021-09-30 PROCEDURE — 1159F PR MEDICATION LIST DOCUMENTED IN MEDICAL RECORD: ICD-10-PCS | Mod: S$GLB,,, | Performed by: FAMILY MEDICINE

## 2021-09-30 PROCEDURE — 90471 IMMUNIZATION ADMIN: CPT | Mod: S$GLB,,, | Performed by: FAMILY MEDICINE

## 2021-09-30 PROCEDURE — 3008F BODY MASS INDEX DOCD: CPT | Mod: S$GLB,,, | Performed by: FAMILY MEDICINE

## 2021-09-30 PROCEDURE — 3074F SYST BP LT 130 MM HG: CPT | Mod: S$GLB,,, | Performed by: FAMILY MEDICINE

## 2021-09-30 PROCEDURE — 99999 PR PBB SHADOW E&M-EST. PATIENT-LVL V: CPT | Mod: PBBFAC,,, | Performed by: FAMILY MEDICINE

## 2021-09-30 PROCEDURE — 4010F PR ACE/ARB THEARPY RXD/TAKEN: ICD-10-PCS | Mod: S$GLB,,, | Performed by: FAMILY MEDICINE

## 2021-09-30 PROCEDURE — 3078F DIAST BP <80 MM HG: CPT | Mod: S$GLB,,, | Performed by: FAMILY MEDICINE

## 2021-09-30 PROCEDURE — 3074F PR MOST RECENT SYSTOLIC BLOOD PRESSURE < 130 MM HG: ICD-10-PCS | Mod: S$GLB,,, | Performed by: FAMILY MEDICINE

## 2021-09-30 PROCEDURE — 99999 PR PBB SHADOW E&M-EST. PATIENT-LVL V: ICD-10-PCS | Mod: PBBFAC,,, | Performed by: FAMILY MEDICINE

## 2021-09-30 PROCEDURE — 3008F PR BODY MASS INDEX (BMI) DOCUMENTED: ICD-10-PCS | Mod: S$GLB,,, | Performed by: FAMILY MEDICINE

## 2021-09-30 RX ORDER — OLMESARTAN MEDOXOMIL 5 MG/1
5 TABLET ORAL DAILY
Qty: 90 TABLET | Refills: 3 | Status: SHIPPED | OUTPATIENT
Start: 2021-09-30 | End: 2022-05-11

## 2021-09-30 RX ORDER — ALBUTEROL SULFATE 0.83 MG/ML
2.5 SOLUTION RESPIRATORY (INHALATION) EVERY 6 HOURS PRN
Qty: 360 ML | Refills: 2 | Status: SHIPPED | OUTPATIENT
Start: 2021-09-30 | End: 2022-05-11

## 2021-09-30 RX ORDER — HYDROXYZINE HYDROCHLORIDE 10 MG/1
10 TABLET, FILM COATED ORAL 3 TIMES DAILY PRN
Qty: 30 TABLET | Refills: 1 | Status: SHIPPED | OUTPATIENT
Start: 2021-09-30 | End: 2021-11-01

## 2021-09-30 RX ORDER — GUAIFENESIN, PSEUDOEPHEDRINE HYDROCHLORIDE 600; 60 MG/1; MG/1
1 TABLET, EXTENDED RELEASE ORAL DAILY
COMMUNITY
End: 2022-11-02

## 2021-10-01 ENCOUNTER — LAB VISIT (OUTPATIENT)
Dept: LAB | Facility: HOSPITAL | Age: 51
End: 2021-10-01
Attending: FAMILY MEDICINE
Payer: COMMERCIAL

## 2021-10-01 DIAGNOSIS — J31.0 CHRONIC RHINITIS: ICD-10-CM

## 2021-10-01 DIAGNOSIS — Z13.220 ENCOUNTER FOR LIPID SCREENING FOR CARDIOVASCULAR DISEASE: ICD-10-CM

## 2021-10-01 DIAGNOSIS — J30.1 ALLERGIC RHINITIS DUE TO POLLEN: Primary | ICD-10-CM

## 2021-10-01 DIAGNOSIS — D83.0 PREDOMINANTLY B-CELL DEFECT: ICD-10-CM

## 2021-10-01 DIAGNOSIS — Z13.6 ENCOUNTER FOR LIPID SCREENING FOR CARDIOVASCULAR DISEASE: ICD-10-CM

## 2021-10-01 DIAGNOSIS — Z13.1 SCREENING FOR DIABETES MELLITUS: ICD-10-CM

## 2021-10-01 DIAGNOSIS — Z11.59 NEED FOR HEPATITIS C SCREENING TEST: ICD-10-CM

## 2021-10-01 LAB
ALBUMIN SERPL BCP-MCNC: 3.7 G/DL (ref 3.5–5.2)
ALP SERPL-CCNC: 88 U/L (ref 55–135)
ALT SERPL W/O P-5'-P-CCNC: 28 U/L (ref 10–44)
ANION GAP SERPL CALC-SCNC: 12 MMOL/L (ref 8–16)
AST SERPL-CCNC: 27 U/L (ref 10–40)
BASOPHILS # BLD AUTO: 0.02 K/UL (ref 0–0.2)
BASOPHILS NFR BLD: 0.6 % (ref 0–1.9)
BILIRUB SERPL-MCNC: 1.4 MG/DL (ref 0.1–1)
BUN SERPL-MCNC: 13 MG/DL (ref 6–20)
CALCIUM SERPL-MCNC: 9.6 MG/DL (ref 8.7–10.5)
CHLORIDE SERPL-SCNC: 104 MMOL/L (ref 95–110)
CHOLEST SERPL-MCNC: 199 MG/DL (ref 120–199)
CHOLEST/HDLC SERPL: 3.8 {RATIO} (ref 2–5)
CO2 SERPL-SCNC: 23 MMOL/L (ref 23–29)
CREAT SERPL-MCNC: 0.6 MG/DL (ref 0.5–1.4)
DIFFERENTIAL METHOD: ABNORMAL
EOSINOPHIL # BLD AUTO: 0 K/UL (ref 0–0.5)
EOSINOPHIL NFR BLD: 0.8 % (ref 0–8)
ERYTHROCYTE [DISTWIDTH] IN BLOOD BY AUTOMATED COUNT: 12.1 % (ref 11.5–14.5)
EST. GFR  (AFRICAN AMERICAN): >60 ML/MIN/1.73 M^2
EST. GFR  (NON AFRICAN AMERICAN): >60 ML/MIN/1.73 M^2
ESTIMATED AVG GLUCOSE: 94 MG/DL (ref 68–131)
GLUCOSE SERPL-MCNC: 90 MG/DL (ref 70–110)
HBA1C MFR BLD: 4.9 % (ref 4–5.6)
HCT VFR BLD AUTO: 39.3 % (ref 37–48.5)
HCV AB SERPL QL IA: NEGATIVE
HDLC SERPL-MCNC: 52 MG/DL (ref 40–75)
HDLC SERPL: 26.1 % (ref 20–50)
HGB BLD-MCNC: 13.3 G/DL (ref 12–16)
IGA SERPL-MCNC: 200 MG/DL (ref 40–350)
IGG SERPL-MCNC: 1715 MG/DL (ref 650–1600)
IGM SERPL-MCNC: 65 MG/DL (ref 50–300)
IMM GRANULOCYTES # BLD AUTO: 0 K/UL (ref 0–0.04)
IMM GRANULOCYTES NFR BLD AUTO: 0 % (ref 0–0.5)
LDLC SERPL CALC-MCNC: 129 MG/DL (ref 63–159)
LYMPHOCYTES # BLD AUTO: 1.6 K/UL (ref 1–4.8)
LYMPHOCYTES NFR BLD: 44.2 % (ref 18–48)
MCH RBC QN AUTO: 30.5 PG (ref 27–31)
MCHC RBC AUTO-ENTMCNC: 33.8 G/DL (ref 32–36)
MCV RBC AUTO: 90 FL (ref 82–98)
MONOCYTES # BLD AUTO: 0.3 K/UL (ref 0.3–1)
MONOCYTES NFR BLD: 7.6 % (ref 4–15)
NEUTROPHILS # BLD AUTO: 1.7 K/UL (ref 1.8–7.7)
NEUTROPHILS NFR BLD: 46.8 % (ref 38–73)
NONHDLC SERPL-MCNC: 147 MG/DL
NRBC BLD-RTO: 0 /100 WBC
PLATELET # BLD AUTO: 170 K/UL (ref 150–450)
PMV BLD AUTO: 12.3 FL (ref 9.2–12.9)
POTASSIUM SERPL-SCNC: 4 MMOL/L (ref 3.5–5.1)
PROT SERPL-MCNC: 7.7 G/DL (ref 6–8.4)
RBC # BLD AUTO: 4.36 M/UL (ref 4–5.4)
SODIUM SERPL-SCNC: 139 MMOL/L (ref 136–145)
TRIGL SERPL-MCNC: 90 MG/DL (ref 30–150)
TSH SERPL DL<=0.005 MIU/L-ACNC: 0.79 UIU/ML (ref 0.4–4)
WBC # BLD AUTO: 3.55 K/UL (ref 3.9–12.7)

## 2021-10-01 PROCEDURE — 82784 ASSAY IGA/IGD/IGG/IGM EACH: CPT | Mod: 59 | Performed by: SPECIALIST

## 2021-10-01 PROCEDURE — 82784 ASSAY IGA/IGD/IGG/IGM EACH: CPT | Performed by: SPECIALIST

## 2021-10-01 PROCEDURE — 82785 ASSAY OF IGE: CPT | Performed by: SPECIALIST

## 2021-10-01 PROCEDURE — 36415 COLL VENOUS BLD VENIPUNCTURE: CPT | Mod: PO | Performed by: FAMILY MEDICINE

## 2021-10-01 PROCEDURE — 82787 IGG 1 2 3 OR 4 EACH: CPT | Mod: 59 | Performed by: SPECIALIST

## 2021-10-01 PROCEDURE — 80053 COMPREHEN METABOLIC PANEL: CPT | Performed by: SPECIALIST

## 2021-10-01 PROCEDURE — 86803 HEPATITIS C AB TEST: CPT | Performed by: FAMILY MEDICINE

## 2021-10-01 PROCEDURE — 83036 HEMOGLOBIN GLYCOSYLATED A1C: CPT | Performed by: FAMILY MEDICINE

## 2021-10-01 PROCEDURE — 80061 LIPID PANEL: CPT | Performed by: FAMILY MEDICINE

## 2021-10-01 PROCEDURE — 85025 COMPLETE CBC W/AUTO DIFF WBC: CPT | Performed by: SPECIALIST

## 2021-10-01 PROCEDURE — 84443 ASSAY THYROID STIM HORMONE: CPT | Performed by: SPECIALIST

## 2021-10-04 LAB — IGE SERPL-ACNC: <35 IU/ML (ref 0–100)

## 2021-10-05 LAB
IGG1 SER-MCNC: 975 MG/DL (ref 382–929)
IGG2 SER-MCNC: 601 MG/DL (ref 242–700)
IGG3 SER-MCNC: 59 MG/DL (ref 22–176)
IGG4 SER-MCNC: 14 MG/DL (ref 4–86)

## 2021-10-06 DIAGNOSIS — M79.671 ARCH PAIN OF RIGHT FOOT: ICD-10-CM

## 2021-10-06 DIAGNOSIS — M79.671 PAIN IN BOTH FEET: Primary | ICD-10-CM

## 2021-10-06 DIAGNOSIS — M79.672 PAIN OF BOTH HEELS: ICD-10-CM

## 2021-10-06 DIAGNOSIS — M79.671 PAIN OF BOTH HEELS: ICD-10-CM

## 2021-10-06 DIAGNOSIS — M79.672 PAIN IN BOTH FEET: Primary | ICD-10-CM

## 2021-10-07 ENCOUNTER — OFFICE VISIT (OUTPATIENT)
Dept: PODIATRY | Facility: CLINIC | Age: 51
End: 2021-10-07
Payer: COMMERCIAL

## 2021-10-07 ENCOUNTER — HOSPITAL ENCOUNTER (OUTPATIENT)
Dept: RADIOLOGY | Facility: CLINIC | Age: 51
Discharge: HOME | End: 2021-10-07
Payer: COMMERCIAL

## 2021-10-07 VITALS — WEIGHT: 183 LBS | HEART RATE: 75 BPM | HEIGHT: 64 IN | BODY MASS INDEX: 31.24 KG/M2 | OXYGEN SATURATION: 98 %

## 2021-10-07 DIAGNOSIS — L84 CORN OR CALLUS: ICD-10-CM

## 2021-10-07 DIAGNOSIS — M79.671 ARCH PAIN OF RIGHT FOOT: ICD-10-CM

## 2021-10-07 DIAGNOSIS — M79.671 FOOT PAIN, RIGHT: ICD-10-CM

## 2021-10-07 DIAGNOSIS — M79.672 PAIN IN BOTH FEET: ICD-10-CM

## 2021-10-07 DIAGNOSIS — M79.672 PAIN OF BOTH HEELS: ICD-10-CM

## 2021-10-07 DIAGNOSIS — M79.672 FOOT PAIN, LEFT: ICD-10-CM

## 2021-10-07 DIAGNOSIS — B07.0 PLANTAR WART: ICD-10-CM

## 2021-10-07 DIAGNOSIS — R26.2 DIFFICULTY IN WALKING, NOT ELSEWHERE CLASSIFIED: ICD-10-CM

## 2021-10-07 DIAGNOSIS — M79.671 PAIN IN BOTH FEET: ICD-10-CM

## 2021-10-07 DIAGNOSIS — M72.2 PLANTAR FASCIITIS: Primary | ICD-10-CM

## 2021-10-07 DIAGNOSIS — D23.72 BENIGN NEOPLASM OF SKIN OF LEFT LOWER EXTREMITY: ICD-10-CM

## 2021-10-07 DIAGNOSIS — M79.671 PAIN OF BOTH HEELS: ICD-10-CM

## 2021-10-07 PROCEDURE — 4010F ACE/ARB THERAPY RXD/TAKEN: CPT | Mod: S$GLB,,, | Performed by: PODIATRIST

## 2021-10-07 PROCEDURE — 17110 PR DESTRUCTION BENIGN LESIONS UP TO 14: ICD-10-PCS | Mod: 51,S$GLB,, | Performed by: PODIATRIST

## 2021-10-07 PROCEDURE — 3044F PR MOST RECENT HEMOGLOBIN A1C LEVEL <7.0%: ICD-10-PCS | Mod: S$GLB,,, | Performed by: PODIATRIST

## 2021-10-07 PROCEDURE — 3008F PR BODY MASS INDEX (BMI) DOCUMENTED: ICD-10-PCS | Mod: S$GLB,,, | Performed by: PODIATRIST

## 2021-10-07 PROCEDURE — 3044F HG A1C LEVEL LT 7.0%: CPT | Mod: S$GLB,,, | Performed by: PODIATRIST

## 2021-10-07 PROCEDURE — 73630 X-RAY EXAM OF FOOT: CPT | Mod: S$GLB,,, | Performed by: RADIOLOGY

## 2021-10-07 PROCEDURE — 20550 NJX 1 TENDON SHEATH/LIGAMENT: CPT | Mod: RT,S$GLB,, | Performed by: PODIATRIST

## 2021-10-07 PROCEDURE — 99214 PR OFFICE/OUTPT VISIT, EST, LEVL IV, 30-39 MIN: ICD-10-PCS | Mod: 25,S$GLB,, | Performed by: PODIATRIST

## 2021-10-07 PROCEDURE — 1159F PR MEDICATION LIST DOCUMENTED IN MEDICAL RECORD: ICD-10-PCS | Mod: S$GLB,,, | Performed by: PODIATRIST

## 2021-10-07 PROCEDURE — 17110 DESTRUCTION B9 LES UP TO 14: CPT | Mod: 51,S$GLB,, | Performed by: PODIATRIST

## 2021-10-07 PROCEDURE — 1160F RVW MEDS BY RX/DR IN RCRD: CPT | Mod: S$GLB,,, | Performed by: PODIATRIST

## 2021-10-07 PROCEDURE — 20550 PR INJECT TENDON SHEATH/LIGAMENT: ICD-10-PCS | Mod: RT,S$GLB,, | Performed by: PODIATRIST

## 2021-10-07 PROCEDURE — 1160F PR REVIEW ALL MEDS BY PRESCRIBER/CLIN PHARMACIST DOCUMENTED: ICD-10-PCS | Mod: S$GLB,,, | Performed by: PODIATRIST

## 2021-10-07 PROCEDURE — 4010F PR ACE/ARB THEARPY RXD/TAKEN: ICD-10-PCS | Mod: S$GLB,,, | Performed by: PODIATRIST

## 2021-10-07 PROCEDURE — 99214 OFFICE O/P EST MOD 30 MIN: CPT | Mod: 25,S$GLB,, | Performed by: PODIATRIST

## 2021-10-07 PROCEDURE — 1159F MED LIST DOCD IN RCRD: CPT | Mod: S$GLB,,, | Performed by: PODIATRIST

## 2021-10-07 PROCEDURE — 73630 XR FOOT COMPLETE 3 VIEW BILATERAL: ICD-10-PCS | Mod: S$GLB,,, | Performed by: RADIOLOGY

## 2021-10-07 PROCEDURE — 3008F BODY MASS INDEX DOCD: CPT | Mod: S$GLB,,, | Performed by: PODIATRIST

## 2021-10-07 RX ORDER — CLARITHROMYCIN 500 MG/1
500 TABLET, FILM COATED ORAL 2 TIMES DAILY
COMMUNITY
Start: 2021-07-26 | End: 2022-11-02

## 2021-10-07 RX ORDER — DEXAMETHASONE SODIUM PHOSPHATE 4 MG/ML
4 INJECTION, SOLUTION INTRA-ARTICULAR; INTRALESIONAL; INTRAMUSCULAR; INTRAVENOUS; SOFT TISSUE
Status: COMPLETED | OUTPATIENT
Start: 2021-10-07 | End: 2021-10-07

## 2021-10-07 RX ORDER — LIDOCAINE HYDROCHLORIDE 20 MG/ML
1 INJECTION, SOLUTION EPIDURAL; INFILTRATION; INTRACAUDAL; PERINEURAL
Status: DISCONTINUED | OUTPATIENT
Start: 2021-10-07 | End: 2023-10-23

## 2021-10-07 RX ORDER — METHYLPREDNISOLONE ACETATE 80 MG/ML
40 INJECTION, SUSPENSION INTRA-ARTICULAR; INTRALESIONAL; INTRAMUSCULAR; SOFT TISSUE
Status: COMPLETED | OUTPATIENT
Start: 2021-10-07 | End: 2021-10-07

## 2021-10-07 RX ORDER — SOD SULF/POT CHLORIDE/MAG SULF 1.479 G
TABLET ORAL
COMMUNITY
Start: 2021-06-03 | End: 2022-11-02

## 2021-10-07 RX ORDER — PERMETHRIN 50 MG/G
CREAM TOPICAL ONCE
COMMUNITY
Start: 2021-09-15 | End: 2022-11-02

## 2021-10-07 RX ORDER — METHYLPREDNISOLONE ACETATE 40 MG/ML
20 INJECTION, SUSPENSION INTRA-ARTICULAR; INTRALESIONAL; INTRAMUSCULAR; SOFT TISSUE
Status: DISCONTINUED | OUTPATIENT
Start: 2021-10-07 | End: 2021-10-07

## 2021-10-07 RX ORDER — METHYLPREDNISOLONE ACETATE 80 MG/ML
80 INJECTION, SUSPENSION INTRA-ARTICULAR; INTRALESIONAL; INTRAMUSCULAR; SOFT TISSUE
Status: CANCELLED | OUTPATIENT
Start: 2021-10-07

## 2021-10-07 RX ORDER — METHYLPREDNISOLONE ACETATE 80 MG/ML
80 INJECTION, SUSPENSION INTRA-ARTICULAR; INTRALESIONAL; INTRAMUSCULAR; SOFT TISSUE
Status: DISCONTINUED | OUTPATIENT
Start: 2021-10-07 | End: 2021-10-07

## 2021-10-07 RX ADMIN — METHYLPREDNISOLONE ACETATE 40 MG: 80 INJECTION, SUSPENSION INTRA-ARTICULAR; INTRALESIONAL; INTRAMUSCULAR; SOFT TISSUE at 05:10

## 2021-10-07 RX ADMIN — DEXAMETHASONE SODIUM PHOSPHATE 4 MG: 4 INJECTION, SOLUTION INTRA-ARTICULAR; INTRALESIONAL; INTRAMUSCULAR; INTRAVENOUS; SOFT TISSUE at 05:10

## 2021-10-08 NOTE — TELEPHONE ENCOUNTER
PA for montelukast completed and faxed to [employer]    Please advice evisit AND urine sample drop off.  Thank you

## 2021-10-21 ENCOUNTER — OFFICE VISIT (OUTPATIENT)
Dept: PODIATRY | Facility: CLINIC | Age: 51
End: 2021-10-21
Payer: COMMERCIAL

## 2021-10-21 VITALS — WEIGHT: 183 LBS | HEIGHT: 64 IN | BODY MASS INDEX: 31.24 KG/M2

## 2021-10-21 DIAGNOSIS — M72.2 PLANTAR FASCIITIS: Primary | ICD-10-CM

## 2021-10-21 DIAGNOSIS — M79.672 FOOT PAIN, LEFT: ICD-10-CM

## 2021-10-21 DIAGNOSIS — R26.2 DIFFICULTY IN WALKING, NOT ELSEWHERE CLASSIFIED: ICD-10-CM

## 2021-10-21 DIAGNOSIS — B07.0 PLANTAR WART: ICD-10-CM

## 2021-10-21 DIAGNOSIS — D23.72 BENIGN NEOPLASM OF SKIN OF LEFT LOWER EXTREMITY: ICD-10-CM

## 2021-10-21 DIAGNOSIS — M79.671 RIGHT FOOT PAIN: ICD-10-CM

## 2021-10-21 DIAGNOSIS — L84 CORN OR CALLUS: ICD-10-CM

## 2021-10-21 DIAGNOSIS — M79.671 FOOT PAIN, RIGHT: ICD-10-CM

## 2021-10-21 PROCEDURE — 1160F RVW MEDS BY RX/DR IN RCRD: CPT | Mod: S$GLB,,, | Performed by: PODIATRIST

## 2021-10-21 PROCEDURE — 1159F MED LIST DOCD IN RCRD: CPT | Mod: S$GLB,,, | Performed by: PODIATRIST

## 2021-10-21 PROCEDURE — 4010F PR ACE/ARB THEARPY RXD/TAKEN: ICD-10-PCS | Mod: S$GLB,,, | Performed by: PODIATRIST

## 2021-10-21 PROCEDURE — 4010F ACE/ARB THERAPY RXD/TAKEN: CPT | Mod: S$GLB,,, | Performed by: PODIATRIST

## 2021-10-21 PROCEDURE — 99213 OFFICE O/P EST LOW 20 MIN: CPT | Mod: S$GLB,,, | Performed by: PODIATRIST

## 2021-10-21 PROCEDURE — 3008F BODY MASS INDEX DOCD: CPT | Mod: S$GLB,,, | Performed by: PODIATRIST

## 2021-10-21 PROCEDURE — 3044F HG A1C LEVEL LT 7.0%: CPT | Mod: S$GLB,,, | Performed by: PODIATRIST

## 2021-10-21 PROCEDURE — 99213 PR OFFICE/OUTPT VISIT, EST, LEVL III, 20-29 MIN: ICD-10-PCS | Mod: S$GLB,,, | Performed by: PODIATRIST

## 2021-10-21 PROCEDURE — 1160F PR REVIEW ALL MEDS BY PRESCRIBER/CLIN PHARMACIST DOCUMENTED: ICD-10-PCS | Mod: S$GLB,,, | Performed by: PODIATRIST

## 2021-10-21 PROCEDURE — 3008F PR BODY MASS INDEX (BMI) DOCUMENTED: ICD-10-PCS | Mod: S$GLB,,, | Performed by: PODIATRIST

## 2021-10-21 PROCEDURE — 1159F PR MEDICATION LIST DOCUMENTED IN MEDICAL RECORD: ICD-10-PCS | Mod: S$GLB,,, | Performed by: PODIATRIST

## 2021-10-21 PROCEDURE — 3044F PR MOST RECENT HEMOGLOBIN A1C LEVEL <7.0%: ICD-10-PCS | Mod: S$GLB,,, | Performed by: PODIATRIST

## 2021-10-21 RX ORDER — AZELASTINE 1 MG/ML
SPRAY, METERED NASAL
COMMUNITY
Start: 2021-10-17 | End: 2022-11-02 | Stop reason: SDUPTHER

## 2021-10-21 RX ORDER — MOXIFLOXACIN HYDROCHLORIDE 400 MG/1
400 TABLET ORAL DAILY
COMMUNITY
Start: 2021-10-12 | End: 2022-11-02

## 2021-10-25 ENCOUNTER — PROCEDURE VISIT (OUTPATIENT)
Dept: SLEEP MEDICINE | Facility: HOSPITAL | Age: 51
End: 2021-10-25
Attending: FAMILY MEDICINE
Payer: COMMERCIAL

## 2021-10-25 DIAGNOSIS — R53.83 FATIGUE, UNSPECIFIED TYPE: ICD-10-CM

## 2021-10-25 DIAGNOSIS — R06.83 SNORES: ICD-10-CM

## 2021-10-25 PROCEDURE — 95806 SLEEP STUDY UNATT&RESP EFFT: CPT

## 2021-11-01 ENCOUNTER — LAB VISIT (OUTPATIENT)
Dept: LAB | Facility: HOSPITAL | Age: 51
End: 2021-11-01
Attending: FAMILY MEDICINE
Payer: COMMERCIAL

## 2021-11-01 ENCOUNTER — OFFICE VISIT (OUTPATIENT)
Dept: FAMILY MEDICINE | Facility: CLINIC | Age: 51
End: 2021-11-01
Payer: COMMERCIAL

## 2021-11-01 VITALS
WEIGHT: 183.88 LBS | TEMPERATURE: 98 F | HEIGHT: 64 IN | BODY MASS INDEX: 31.39 KG/M2 | HEART RATE: 88 BPM | DIASTOLIC BLOOD PRESSURE: 58 MMHG | SYSTOLIC BLOOD PRESSURE: 106 MMHG | OXYGEN SATURATION: 97 %

## 2021-11-01 DIAGNOSIS — I10 BENIGN ESSENTIAL HTN: ICD-10-CM

## 2021-11-01 DIAGNOSIS — F41.9 ANXIETY: Primary | ICD-10-CM

## 2021-11-01 DIAGNOSIS — Z23 IMMUNIZATION DUE: ICD-10-CM

## 2021-11-01 LAB
ALBUMIN/CREAT UR: NORMAL UG/MG (ref 0–30)
BACTERIA #/AREA URNS AUTO: NORMAL /HPF
BILIRUB UR QL STRIP: NEGATIVE
CLARITY UR REFRACT.AUTO: ABNORMAL
COLOR UR AUTO: YELLOW
CREAT UR-MCNC: 126 MG/DL (ref 15–325)
GLUCOSE UR QL STRIP: NEGATIVE
HGB UR QL STRIP: NEGATIVE
KETONES UR QL STRIP: NEGATIVE
LEUKOCYTE ESTERASE UR QL STRIP: NEGATIVE
MICROALBUMIN UR DL<=1MG/L-MCNC: <5 UG/ML
MICROSCOPIC COMMENT: NORMAL
NITRITE UR QL STRIP: NEGATIVE
PH UR STRIP: 6 [PH] (ref 5–8)
PROT UR QL STRIP: NEGATIVE
RBC #/AREA URNS AUTO: 3 /HPF (ref 0–4)
SP GR UR STRIP: 1.02 (ref 1–1.03)
SQUAMOUS #/AREA URNS AUTO: 1 /HPF
URN SPEC COLLECT METH UR: ABNORMAL
WBC #/AREA URNS AUTO: 2 /HPF (ref 0–5)

## 2021-11-01 PROCEDURE — 4010F PR ACE/ARB THEARPY RXD/TAKEN: ICD-10-PCS | Mod: S$GLB,,, | Performed by: FAMILY MEDICINE

## 2021-11-01 PROCEDURE — 3078F PR MOST RECENT DIASTOLIC BLOOD PRESSURE < 80 MM HG: ICD-10-PCS | Mod: S$GLB,,, | Performed by: FAMILY MEDICINE

## 2021-11-01 PROCEDURE — 82570 ASSAY OF URINE CREATININE: CPT | Performed by: FAMILY MEDICINE

## 2021-11-01 PROCEDURE — 3044F HG A1C LEVEL LT 7.0%: CPT | Mod: S$GLB,,, | Performed by: FAMILY MEDICINE

## 2021-11-01 PROCEDURE — 99999 PR PBB SHADOW E&M-EST. PATIENT-LVL IV: ICD-10-PCS | Mod: PBBFAC,,, | Performed by: FAMILY MEDICINE

## 2021-11-01 PROCEDURE — 90471 ZOSTER RECOMBINANT VACCINE: ICD-10-PCS | Mod: S$GLB,,, | Performed by: FAMILY MEDICINE

## 2021-11-01 PROCEDURE — 3044F PR MOST RECENT HEMOGLOBIN A1C LEVEL <7.0%: ICD-10-PCS | Mod: S$GLB,,, | Performed by: FAMILY MEDICINE

## 2021-11-01 PROCEDURE — 1159F PR MEDICATION LIST DOCUMENTED IN MEDICAL RECORD: ICD-10-PCS | Mod: S$GLB,,, | Performed by: FAMILY MEDICINE

## 2021-11-01 PROCEDURE — 90750 ZOSTER RECOMBINANT VACCINE: ICD-10-PCS | Mod: S$GLB,,, | Performed by: FAMILY MEDICINE

## 2021-11-01 PROCEDURE — 3074F SYST BP LT 130 MM HG: CPT | Mod: S$GLB,,, | Performed by: FAMILY MEDICINE

## 2021-11-01 PROCEDURE — 1159F MED LIST DOCD IN RCRD: CPT | Mod: S$GLB,,, | Performed by: FAMILY MEDICINE

## 2021-11-01 PROCEDURE — 99999 PR PBB SHADOW E&M-EST. PATIENT-LVL IV: CPT | Mod: PBBFAC,,, | Performed by: FAMILY MEDICINE

## 2021-11-01 PROCEDURE — 90471 IMMUNIZATION ADMIN: CPT | Mod: S$GLB,,, | Performed by: FAMILY MEDICINE

## 2021-11-01 PROCEDURE — 81001 URINALYSIS AUTO W/SCOPE: CPT | Performed by: FAMILY MEDICINE

## 2021-11-01 PROCEDURE — 3078F DIAST BP <80 MM HG: CPT | Mod: S$GLB,,, | Performed by: FAMILY MEDICINE

## 2021-11-01 PROCEDURE — 90750 HZV VACC RECOMBINANT IM: CPT | Mod: S$GLB,,, | Performed by: FAMILY MEDICINE

## 2021-11-01 PROCEDURE — 3074F PR MOST RECENT SYSTOLIC BLOOD PRESSURE < 130 MM HG: ICD-10-PCS | Mod: S$GLB,,, | Performed by: FAMILY MEDICINE

## 2021-11-01 PROCEDURE — 4010F ACE/ARB THERAPY RXD/TAKEN: CPT | Mod: S$GLB,,, | Performed by: FAMILY MEDICINE

## 2021-11-01 PROCEDURE — 3008F BODY MASS INDEX DOCD: CPT | Mod: S$GLB,,, | Performed by: FAMILY MEDICINE

## 2021-11-01 PROCEDURE — 3008F PR BODY MASS INDEX (BMI) DOCUMENTED: ICD-10-PCS | Mod: S$GLB,,, | Performed by: FAMILY MEDICINE

## 2021-11-01 PROCEDURE — 99214 OFFICE O/P EST MOD 30 MIN: CPT | Mod: 25,S$GLB,, | Performed by: FAMILY MEDICINE

## 2021-11-01 PROCEDURE — 99214 PR OFFICE/OUTPT VISIT, EST, LEVL IV, 30-39 MIN: ICD-10-PCS | Mod: 25,S$GLB,, | Performed by: FAMILY MEDICINE

## 2021-11-01 RX ORDER — HYDROXYZINE HYDROCHLORIDE 25 MG/1
25 TABLET, FILM COATED ORAL 3 TIMES DAILY PRN
Qty: 90 TABLET | Refills: 3 | Status: SHIPPED | OUTPATIENT
Start: 2021-11-01 | End: 2022-11-02

## 2021-11-08 ENCOUNTER — TELEPHONE (OUTPATIENT)
Dept: FAMILY MEDICINE | Facility: CLINIC | Age: 51
End: 2021-11-08
Payer: COMMERCIAL

## 2021-11-08 DIAGNOSIS — B00.1 FEVER BLISTER: Primary | ICD-10-CM

## 2021-11-08 RX ORDER — VALACYCLOVIR HYDROCHLORIDE 1 G/1
2000 TABLET, FILM COATED ORAL 2 TIMES DAILY
Qty: 4 TABLET | Refills: 0 | Status: SHIPPED | OUTPATIENT
Start: 2021-11-08 | End: 2021-12-27

## 2021-11-29 ENCOUNTER — TELEPHONE (OUTPATIENT)
Dept: FAMILY MEDICINE | Facility: CLINIC | Age: 51
End: 2021-11-29
Payer: COMMERCIAL

## 2021-11-29 DIAGNOSIS — G47.33 OSA (OBSTRUCTIVE SLEEP APNEA): Primary | ICD-10-CM

## 2022-01-27 DIAGNOSIS — Z12.31 ENCOUNTER FOR SCREENING MAMMOGRAM FOR MALIGNANT NEOPLASM OF BREAST: Primary | ICD-10-CM

## 2022-02-13 ENCOUNTER — OFFICE VISIT (OUTPATIENT)
Dept: URGENT CARE | Facility: CLINIC | Age: 52
End: 2022-02-13
Payer: COMMERCIAL

## 2022-02-13 VITALS
TEMPERATURE: 99 F | HEART RATE: 86 BPM | BODY MASS INDEX: 35.93 KG/M2 | WEIGHT: 183 LBS | DIASTOLIC BLOOD PRESSURE: 68 MMHG | HEIGHT: 60 IN | RESPIRATION RATE: 20 BRPM | OXYGEN SATURATION: 96 % | SYSTOLIC BLOOD PRESSURE: 106 MMHG

## 2022-02-13 DIAGNOSIS — R68.89 FLU-LIKE SYMPTOMS: Primary | ICD-10-CM

## 2022-02-13 DIAGNOSIS — R05.9 COUGH: ICD-10-CM

## 2022-02-13 LAB
CTP QC/QA: YES
CTP QC/QA: YES
FLUAV AG NPH QL: NEGATIVE
FLUBV AG NPH QL: NEGATIVE
SARS-COV-2 AG RESP QL IA.RAPID: NEGATIVE

## 2022-02-13 PROCEDURE — 99204 PR OFFICE/OUTPT VISIT, NEW, LEVL IV, 45-59 MIN: ICD-10-PCS | Mod: S$GLB,,, | Performed by: NURSE PRACTITIONER

## 2022-02-13 PROCEDURE — 87811 SARS CORONAVIRUS 2 ANTIGEN POCT, MANUAL READ: ICD-10-PCS | Mod: S$GLB,,, | Performed by: NURSE PRACTITIONER

## 2022-02-13 PROCEDURE — 3008F PR BODY MASS INDEX (BMI) DOCUMENTED: ICD-10-PCS | Mod: S$GLB,,, | Performed by: NURSE PRACTITIONER

## 2022-02-13 PROCEDURE — 1159F MED LIST DOCD IN RCRD: CPT | Mod: S$GLB,,, | Performed by: NURSE PRACTITIONER

## 2022-02-13 PROCEDURE — 1160F PR REVIEW ALL MEDS BY PRESCRIBER/CLIN PHARMACIST DOCUMENTED: ICD-10-PCS | Mod: S$GLB,,, | Performed by: NURSE PRACTITIONER

## 2022-02-13 PROCEDURE — 87804 POCT INFLUENZA A/B: ICD-10-PCS | Mod: 59,QW,, | Performed by: NURSE PRACTITIONER

## 2022-02-13 PROCEDURE — 3074F SYST BP LT 130 MM HG: CPT | Mod: S$GLB,,, | Performed by: NURSE PRACTITIONER

## 2022-02-13 PROCEDURE — 87804 INFLUENZA ASSAY W/OPTIC: CPT | Mod: QW,,, | Performed by: NURSE PRACTITIONER

## 2022-02-13 PROCEDURE — 99204 OFFICE O/P NEW MOD 45 MIN: CPT | Mod: S$GLB,,, | Performed by: NURSE PRACTITIONER

## 2022-02-13 PROCEDURE — 4010F ACE/ARB THERAPY RXD/TAKEN: CPT | Mod: S$GLB,,, | Performed by: NURSE PRACTITIONER

## 2022-02-13 PROCEDURE — 3078F PR MOST RECENT DIASTOLIC BLOOD PRESSURE < 80 MM HG: ICD-10-PCS | Mod: S$GLB,,, | Performed by: NURSE PRACTITIONER

## 2022-02-13 PROCEDURE — 3074F PR MOST RECENT SYSTOLIC BLOOD PRESSURE < 130 MM HG: ICD-10-PCS | Mod: S$GLB,,, | Performed by: NURSE PRACTITIONER

## 2022-02-13 PROCEDURE — 87811 SARS-COV-2 COVID19 W/OPTIC: CPT | Mod: S$GLB,,, | Performed by: NURSE PRACTITIONER

## 2022-02-13 PROCEDURE — 1159F PR MEDICATION LIST DOCUMENTED IN MEDICAL RECORD: ICD-10-PCS | Mod: S$GLB,,, | Performed by: NURSE PRACTITIONER

## 2022-02-13 PROCEDURE — 4010F PR ACE/ARB THEARPY RXD/TAKEN: ICD-10-PCS | Mod: S$GLB,,, | Performed by: NURSE PRACTITIONER

## 2022-02-13 PROCEDURE — 3078F DIAST BP <80 MM HG: CPT | Mod: S$GLB,,, | Performed by: NURSE PRACTITIONER

## 2022-02-13 PROCEDURE — 3008F BODY MASS INDEX DOCD: CPT | Mod: S$GLB,,, | Performed by: NURSE PRACTITIONER

## 2022-02-13 PROCEDURE — 1160F RVW MEDS BY RX/DR IN RCRD: CPT | Mod: S$GLB,,, | Performed by: NURSE PRACTITIONER

## 2022-02-13 RX ORDER — PREDNISONE 20 MG/1
20 TABLET ORAL DAILY
Qty: 5 TABLET | Refills: 0 | Status: SHIPPED | OUTPATIENT
Start: 2022-02-13 | End: 2022-02-18

## 2022-02-13 RX ORDER — CODEINE PHOSPHATE AND GUAIFENESIN 10; 100 MG/5ML; MG/5ML
5 SOLUTION ORAL 3 TIMES DAILY PRN
Qty: 60 ML | Refills: 0 | Status: SHIPPED | OUTPATIENT
Start: 2022-02-13 | End: 2022-02-23

## 2022-02-13 NOTE — PROGRESS NOTES
"CHIEF COMPLAINT  Chief Complaint   Patient presents with    Cough       HPI  Sondra Krause a 51 y.o. female who presents with c/o headache, postnasal drip, non productive cough and sob x2 days. Pt reports her daughter tested positive for the flu this morning. Pt reports she has been in [place] the past week and under a great deal of stress due to her nephew recently dying and thinks "I'm just run down" Pt denies fever, rash, chest pain, abdominal pain, n/v/d. Pt reports history of asthma but does not feel that she is using her inhaler more than normal.       CURRENT MEDICATIONS  Current Outpatient Medications on File Prior to Visit   Medication Sig Dispense Refill    albuterol (PROVENTIL) 2.5 mg /3 mL (0.083 %) nebulizer solution Take 3 mLs (2.5 mg total) by nebulization every 6 (six) hours as needed for Wheezing or Shortness of Breath. 360 mL 2    ascorbic acid, vitamin C, (VITAMIN C) 500 MG tablet Take 500 mg by mouth 3 (three) times daily.      azelastine (ASTELIN) 137 mcg (0.1 %) nasal spray SMARTSIG:Both Nares      budesonide-formoterol 160-4.5 mcg (SYMBICORT) 160-4.5 mcg/actuation HFAA Inhale 2 puffs into the lungs every 12 (twelve) hours. Controller      clarithromycin (BIAXIN) 500 MG tablet Take 500 mg by mouth 2 (two) times daily.      cyanocobalamin 1,000 mcg/mL injection INJECT 1 ML (1,000 MCG TOTAL) INTO THE SKIN EVERY 7 DAYS. 30 mL 3    EPINEPHrine (EPIPEN) 0.3 mg/0.3 mL AtIn SMARTSI.3 Milliliter(s) IM Once PRN      fluticasone propionate (FLONASE) 50 mcg/actuation nasal spray USE 2 SPRAY(S) IN EACH NOSTRIL TWICE DAILY      hydrOXYzine HCL (ATARAX) 25 MG tablet Take 1 tablet (25 mg total) by mouth 3 (three) times daily as needed for Anxiety. 90 tablet 3    levocetirizine (XYZAL) 5 MG tablet Take 5 mg by mouth every evening.      moxifloxacin (AVELOX) 400 mg tablet Take 400 mg by mouth once daily.      olmesartan (BENICAR) 5 MG Tab Take 1 tablet (5 mg total) by mouth once daily. " 90 tablet 3    permethrin (ELIMITE) 5 % cream Apply topically once.      pseudoephedrine-guaiFENesin  mg (MUCINEX D)  mg per tablet Take 1 tablet by mouth once daily.      selenium 50 mcg Tab Take 200 mcg by mouth once daily.      SUTAB 1.479-0.188- 0.225 gram tablet TAKE 1 DOSE PACK BY MOUTH      tiotropium bromide (SPIRIVA RESPIMAT) 1.25 mcg/actuation inhaler Inhale 2 puffs into the lungs once daily. Controller 4 g 0    valACYclovir (VALTREX) 1000 MG tablet TAKE 2 TABLETS BY MOUTH TWICE DAILY TAKE  AS  NEEDED  FOR  FEVER  BLISTERS  FOR  ONE  DAY 4 tablet 0    vitamin D 1000 units Tab Take 185 mg by mouth once daily.       Current Facility-Administered Medications on File Prior to Visit   Medication Dose Route Frequency Provider Last Rate Last Admin    LIDOcaine (PF) 20 mg/ml (2%) injection 20 mg  1 mL Other 1 time in Clinic/HOD Padmini Roy DPM           ALLERGIES  Review of patient's allergies indicates:   Allergen Reactions    Sulfamethoxazole-trimethoprim Rash and Hives       Immunization History   Administered Date(s) Administered    COVID-19, MRNA, LN-S, PF (MODERNA FULL 0.5 ML DOSE) 03/23/2021, 04/27/2021    Influenza 10/15/2008, 10/01/2014, 10/06/2015, 09/22/2016    Influenza - Quadrivalent 09/19/2017    Influenza - Quadrivalent - PF *Preferred* (6 months and older) 09/18/2018, 10/18/2019, 10/08/2020, 09/30/2021    Influenza - Trivalent (ADULT) 10/11/2012, 09/27/2016    Influenza - Trivalent - PF (ADULT) 10/15/2008    Influenza Split 10/05/2011, 10/10/2012    Pneumococcal Conjugate - 13 Valent 01/01/2010    Pneumococcal Polysaccharide - 23 Valent 10/05/2011, 09/28/2020    Tdap 07/31/2015    Zoster Recombinant 11/01/2021       PAST MEDICAL HISTORY  Past Medical History:   Diagnosis Date    Asthma     Benign essential HTN 6/19/2017    CVID (common variable immunodeficiency)     Neck strain 5/17/2016    Thyroid nodule 6/28/2017       SURGICAL HISTORY  Past Surgical  History:   Procedure Laterality Date    cervial cone surgery      CLOSURE OF VESICOVAGINAL FISTULA N/A 6/6/2019    Procedure: excision of vaginal lesion vs urethral diverticulectomy;  Surgeon: Sondra Cobb MD;  Location: [facility] OR;  Service: Urology;  Laterality: N/A;   to assist, first case, please put  as co-surgeon    CYSTOSCOPY N/A 4/22/2019    Procedure: CYSTOSCOPY;  Surgeon: Sondra Cobb MD;  Location: [facility] OR;  Service: Urology;  Laterality: N/A;    HYSTERECTOMY      KNEE SURGERY      nose surgery      VAGINOSCOPY N/A 4/22/2019    Procedure: VAGINOSCOPY;  Surgeon: Sondra Cobb MD;  Location: [facility];  Service: Urology;  Laterality: N/A;       SOCIAL HISTORY  Social History     Socioeconomic History    Marital status:    Tobacco Use    Smoking status: Never Smoker    Smokeless tobacco: Never Used   Substance and Sexual Activity    Alcohol use: No    Drug use: No       FAMILY HISTORY  Family History   Problem Relation Age of Onset    Diabetes Mother     Hypertension Mother     Heart disease Mother     Rheum arthritis Mother     Heart disease Father     Hypertension Father        REVIEW OF SYSTEMS  Constitutional: No fever, chills, or weakness.  Eyes: No redness, pain, or discharge  HENT: No ear pain, + headache, no rhinorrhea, no throat pain + post nasal drip  Respiratory: + cough and shortness of breath, no wheezing  Cardiovascular: No chest pain, palpitations or edema  GI: No abdominal pain, nausea, vomiting or diarrhea  Musculoskeletal: No pain, full range of motion. Good sensation  Skin: No rash or abrasion  Neurologic: No focal weakness or sensory changes.  All systems otherwise negative except as noted in the Review of Systems and History of Present Illness      PHYSICAL EXAM  Reviewed Triage Note  VITAL SIGNS: 106/68  Constitutional: Well developed, well nourished, Alert and oriented x3, No acute distress, non-toxic  appearance.  HENT: Normocephalic, Atraumatic, Bilateral external ears normal, external nose negative, oropharynx moist, No oral exudates.  Eyes: PERRL, EOMI, Conjunctiva normal, No discharge.  Neck: Normal range of motion, no tenderness, supple, no carotid bruits  Respiratory: Normal breath sounds, no respiratory distress, no wheezing, no rhonchi, no rales  Cardiovascular: HR 86, normal rhythm, no murmurs, no rubs, no gallops.  Musculoskeletal: No edema, no tenderness, no cyanosis, no clubbing. Good range of motion in all major joints.   Integument: Warm, Dry, No erythema, no rash  Neurologic: Normal motor function, normal sensory function. No focal deficits noted. Intact distal pulses  Psychiatric: Affect normal, judgment normal, mood normal      LABS  Pertinent labs reviewed. (see chart for details)  covid negative  Flu negative    RADIOLOGY  No orders to display         PROCEDURE  Procedures    Physical exam findings and lab results discussed with patient. No acute emergent medical condition identified at this time to warrant further testing. Will dispo home with instructions to follow up with PCP tomorrow. Pt agrees with plan of care.     DISPOSITION  Patient discharged in stable condition     CLINICAL IMPRESSION:  The primary encounter diagnosis was Flu-like symptoms. A diagnosis of Cough was also pertinent to this visit.    Patient advised to follow-up with your PCP within 3 days for BP re-check if Blood Pressure was >120/80 without history of hypertension.

## 2022-02-13 NOTE — PROGRESS NOTES
Subjective:       Patient ID: Sondra Beltran is a 51 y.o. female.    Vitals:  vitals were not taken for this visit.     Chief Complaint: No chief complaint on file.    Patient states she has headache, coughing, hoarse, SOB X 2 days. States her daughter found out today she has the flu.     ROS    Objective:      Physical Exam      Assessment:       No diagnosis found.      Plan:         There are no diagnoses linked to this encounter.

## 2022-02-13 NOTE — PATIENT INSTRUCTIONS
Patient Education       Cough, Runny Nose, and the Common Cold Discharge Instructions   About this topic   The common cold is an infection in the nose and throat. A tiny germ, called a virus, causes this infection. It often affects your nose, throat, ears, and sinuses. A cold can easily spread from person to person. Coughing, sneezing, or touching something with the germ on it spreads the cold.  Most colds go away on their own without treatment. Antibiotics will not help a cold. Your doctor may order drugs to help with the signs of a cold. Even though you may feel very sick, the common cold is not a health emergency.         What care is needed at home?   · Ask your doctor what you need to do when you go home. Make sure you ask questions if you do not understand what the doctor says.  · To help you feel better:  ? Use a cool mist humidifier to avoid breathing dry air.  ? Use hard candy or cough drops to soothe sore throat and cough.  ? Gargle with salt water. Mix 1/2 teaspoon (2.5 grams) salt with a cup (240 mL) of warm water a few times a day.  ? Spray saltwater mist in each nostril. Any normal saline spray works.  ? Sip warm liquids to keep your throat moist.  ? Take warm, steamy showers to help soothe the cough.  · Do not smoke or be in smoke-filled places. Avoid things that may cause breathing problems like vaping, fumes, pollution, dust, and other common allergens.  · You may want to use over-the-counter medicines for allergies or acid reflux if your cough is due to one of these problems.  · You can also use an over-the-counter cough medicine.  · Drink plenty of fluids whenever you are thirsty.  What follow-up care is needed?   Your doctor may ask you to make visits to the office to check on your progress. Be sure to keep these visits.  What drugs may be needed?   Your doctor may order drugs to:  · Help a stuffy nose  · Lower a fever  · Help with pain  · Treat an allergy  · Help with cough  Always talk to your  doctor before you take any drugs. This includes over-the-counter (OTC) drugs and herbal supplements. This is very important if you have high blood pressure.  Will physical activity be limited?   Get lots of rest. Sleep when you are feeling tired. Avoid doing tiring activities.  What changes to diet are needed?   · Chicken soup may be helpful. Warm fluids help thin mucus and help the body get rid of it easier.  What problems could happen?   · Colds may cause signs of asthma for people who have asthma.  · Sinus or ear infection  · Lung infections  · Bronchitis  What can be done to prevent this health problem?   · Wash your hands often with soap and water for at least 20 seconds, especially after coughing or sneezing. Alcohol-based hand sanitizers also work to kill the virus.  · If you are sick, cover your mouth and nose with tissue when you cough or sneeze. You can also cough into your elbow. Throw away tissues in the trash and wash your hands after touching used tissues.  · Clean items and surfaces you most often touch like door handles, remotes, phones, or toys. Wipe them with a disinfectant. This can help reduce the spread of infection.  · Do not get too close (kissing, hugging) to people who are sick.  · Do not share towels or hankies with anyone who is sick.  · Stay away from crowded places.  · Keep your hands away from your eyes and nose because the virus can enter easily to those body areas.  · Get a flu shot each year.  When do I need to call the doctor?   · You have chest pain when you cough or trouble breathing.  · You start to cough up blood or yellow or green mucus.  · You have a fever of 100.4°F (38°C) or higher or chills.  · You cough so hard you throw up.  · You are still coughing in 10 days.  Helpful tips   · It is normal that mucus from your runny nose may become thicker and change color. Do not take an antibiotic. Instead, drink more water-based fluids, especially hot tea and soups.  · Most colds go  away within a week. If you are still sick after 14 days, see your doctor.  Teach Back: Helping You Understand   The Teach Back Method helps you understand the information we are giving you. After you talk with the staff, tell them in your own words what you learned. This helps to make sure the staff has described each thing clearly. It also helps to explain things that may have been confusing. Before going home, make sure you can do these:  · I can tell you about my condition.  · I can tell you what may help ease my breathing.  · I can tell you what I can do to help avoid passing the infection to others.  · I can tell you what I will do if I have a fever, chills, or trouble breathing.  Where can I learn more?   American Academy of Family Physicians  https://familydoctor.org/condition/colds-and-the-flu/   American Lung Association  http://www.lung.org/lung-health-and-diseases/lung-disease-lookup/influenza/facts-about-the-common-cold.html   Centers for Disease Control and Prevention  https://www.cdc.gov/features/rhinoviruses/   Kids Health  http://kidshealth.org/en/parents/cold.html?ref=search&WT.ac=lan-c-mgcp-pc-gwncmg-htk   Last Reviewed Date   2021-06-09  Consumer Information Use and Disclaimer   This information is not specific medical advice and does not replace information you receive from your health care provider. This is only a brief summary of general information. It does NOT include all information about conditions, illnesses, injuries, tests, procedures, treatments, therapies, discharge instructions or life-style choices that may apply to you. You must talk with your health care provider for complete information about your health and treatment options. This information should not be used to decide whether or not to accept your health care providers advice, instructions or recommendations. Only your health care provider has the knowledge and training to provide advice that is right for you.  Copyright    Copyright © 2021 [employer] and its affiliates and/or licensors. All rights reserved.

## 2022-02-19 ENCOUNTER — NURSE TRIAGE (OUTPATIENT)
Dept: ADMINISTRATIVE | Facility: CLINIC | Age: 52
End: 2022-02-19
Payer: COMMERCIAL

## 2022-02-19 ENCOUNTER — OFFICE VISIT (OUTPATIENT)
Dept: URGENT CARE | Facility: CLINIC | Age: 52
End: 2022-02-19
Payer: COMMERCIAL

## 2022-02-19 VITALS
SYSTOLIC BLOOD PRESSURE: 132 MMHG | OXYGEN SATURATION: 97 % | RESPIRATION RATE: 16 BRPM | HEIGHT: 60 IN | TEMPERATURE: 98 F | DIASTOLIC BLOOD PRESSURE: 79 MMHG | WEIGHT: 183 LBS | BODY MASS INDEX: 35.93 KG/M2 | HEART RATE: 77 BPM

## 2022-02-19 DIAGNOSIS — J45.909 ASTHMATIC BRONCHITIS WITHOUT COMPLICATION, UNSPECIFIED ASTHMA SEVERITY, UNSPECIFIED WHETHER PERSISTENT: Primary | ICD-10-CM

## 2022-02-19 DIAGNOSIS — J04.0 LARYNGITIS: ICD-10-CM

## 2022-02-19 PROCEDURE — 99214 OFFICE O/P EST MOD 30 MIN: CPT | Mod: 25,S$GLB,, | Performed by: EMERGENCY MEDICINE

## 2022-02-19 PROCEDURE — 99214 PR OFFICE/OUTPT VISIT, EST, LEVL IV, 30-39 MIN: ICD-10-PCS | Mod: 25,S$GLB,, | Performed by: EMERGENCY MEDICINE

## 2022-02-19 PROCEDURE — 3078F DIAST BP <80 MM HG: CPT | Mod: S$GLB,,, | Performed by: EMERGENCY MEDICINE

## 2022-02-19 PROCEDURE — 4010F PR ACE/ARB THEARPY RXD/TAKEN: ICD-10-PCS | Mod: S$GLB,,, | Performed by: EMERGENCY MEDICINE

## 2022-02-19 PROCEDURE — 3078F PR MOST RECENT DIASTOLIC BLOOD PRESSURE < 80 MM HG: ICD-10-PCS | Mod: S$GLB,,, | Performed by: EMERGENCY MEDICINE

## 2022-02-19 PROCEDURE — 3075F SYST BP GE 130 - 139MM HG: CPT | Mod: S$GLB,,, | Performed by: EMERGENCY MEDICINE

## 2022-02-19 PROCEDURE — 4010F ACE/ARB THERAPY RXD/TAKEN: CPT | Mod: S$GLB,,, | Performed by: EMERGENCY MEDICINE

## 2022-02-19 PROCEDURE — 3008F PR BODY MASS INDEX (BMI) DOCUMENTED: ICD-10-PCS | Mod: S$GLB,,, | Performed by: EMERGENCY MEDICINE

## 2022-02-19 PROCEDURE — 1159F MED LIST DOCD IN RCRD: CPT | Mod: S$GLB,,, | Performed by: EMERGENCY MEDICINE

## 2022-02-19 PROCEDURE — 1160F PR REVIEW ALL MEDS BY PRESCRIBER/CLIN PHARMACIST DOCUMENTED: ICD-10-PCS | Mod: S$GLB,,, | Performed by: EMERGENCY MEDICINE

## 2022-02-19 PROCEDURE — 3075F PR MOST RECENT SYSTOLIC BLOOD PRESS GE 130-139MM HG: ICD-10-PCS | Mod: S$GLB,,, | Performed by: EMERGENCY MEDICINE

## 2022-02-19 PROCEDURE — 3008F BODY MASS INDEX DOCD: CPT | Mod: S$GLB,,, | Performed by: EMERGENCY MEDICINE

## 2022-02-19 PROCEDURE — 1159F PR MEDICATION LIST DOCUMENTED IN MEDICAL RECORD: ICD-10-PCS | Mod: S$GLB,,, | Performed by: EMERGENCY MEDICINE

## 2022-02-19 PROCEDURE — 96372 PR INJECTION,THERAP/PROPH/DIAG2ST, IM OR SUBCUT: ICD-10-PCS | Mod: S$GLB,,, | Performed by: EMERGENCY MEDICINE

## 2022-02-19 PROCEDURE — 1160F RVW MEDS BY RX/DR IN RCRD: CPT | Mod: S$GLB,,, | Performed by: EMERGENCY MEDICINE

## 2022-02-19 PROCEDURE — 96372 THER/PROPH/DIAG INJ SC/IM: CPT | Mod: S$GLB,,, | Performed by: EMERGENCY MEDICINE

## 2022-02-19 RX ORDER — PREDNISONE 20 MG/1
20 TABLET ORAL DAILY
Qty: 5 TABLET | Refills: 0 | Status: SHIPPED | OUTPATIENT
Start: 2022-02-19 | End: 2022-02-24

## 2022-02-19 RX ORDER — DEXAMETHASONE SODIUM PHOSPHATE 4 MG/ML
8 INJECTION, SOLUTION INTRA-ARTICULAR; INTRALESIONAL; INTRAMUSCULAR; INTRAVENOUS; SOFT TISSUE ONCE
Status: COMPLETED | OUTPATIENT
Start: 2022-02-19 | End: 2022-02-19

## 2022-02-19 RX ORDER — BENZONATATE 100 MG/1
200 CAPSULE ORAL 3 TIMES DAILY PRN
Qty: 30 CAPSULE | Refills: 1 | Status: SHIPPED | OUTPATIENT
Start: 2022-02-19 | End: 2022-03-21

## 2022-02-19 RX ORDER — AMOXICILLIN AND CLAVULANATE POTASSIUM 875; 125 MG/1; MG/1
1 TABLET, FILM COATED ORAL 2 TIMES DAILY
Qty: 14 TABLET | Refills: 0 | Status: SHIPPED | OUTPATIENT
Start: 2022-02-19 | End: 2022-11-02

## 2022-02-19 RX ADMIN — DEXAMETHASONE SODIUM PHOSPHATE 8 MG: 4 INJECTION, SOLUTION INTRA-ARTICULAR; INTRALESIONAL; INTRAMUSCULAR; INTRAVENOUS; SOFT TISSUE at 10:02

## 2022-02-19 NOTE — TELEPHONE ENCOUNTER
Headache, coughing, sneezing, ear pain. H/O Asthma. Started on Prednisone, and Cough Syrup last week.  Medication complete and symptoms have progressed and would like to see MD so Asthma does not flare up. Warm transfer to scheduling department.     Reason for Disposition   Requesting regular office appointment    Protocols used: INFORMATION ONLY CALL - NO TRIAGE-A-AH

## 2022-02-19 NOTE — PROGRESS NOTES
Subjective:       Patient ID: Sondra Beltran is a 51 y.o. female.    Vitals:  height is 5' (1.524 m) and weight is 83 kg (183 lb). Her temperature is 98.1 °F (36.7 °C). Her blood pressure is 132/79 and her pulse is 77. Her respiration is 16 and oxygen saturation is 97%.     Chief Complaint: Sinus Problem    51-year-old female with 1 week to 10 days of cough congestion and wheezing.  Patient was tested negative for COVID and influenza about a week ago.  Patient still has persistent cough and wheezing and congested and given persistent symptoms presented emergency department.  Denies fever or chills or chest pain.  Patient slight shortness of breath with walking and coughing and wheezing    Sinus Problem  This is a recurrent problem. The current episode started 1 to 4 weeks ago. The problem is unchanged. There has been no fever. Associated symptoms include congestion, coughing, headaches and sinus pressure. Past treatments include antibiotics. The treatment provided no relief.       Constitution: Negative.   HENT: Positive for congestion and sinus pressure.    Neck: neck negative.   Cardiovascular: Negative.    Eyes: Negative.    Respiratory: Positive for cough and wheezing.    Gastrointestinal: Negative.    Genitourinary: Negative.    Musculoskeletal: Negative.    Skin: Negative.    Allergic/Immunologic: Negative.    Neurological: Positive for headaches.   Hematologic/Lymphatic: Negative.        Objective:      Physical Exam   Constitutional: She is oriented to person, place, and time. She appears well-developed. She is cooperative.  Non-toxic appearance. She does not appear ill. No distress.   HENT:   Head: Normocephalic and atraumatic.   Ears:   Right Ear: Hearing, tympanic membrane, external ear and ear canal normal.   Left Ear: Hearing, tympanic membrane, external ear and ear canal normal.   Nose: Rhinorrhea and congestion present. No mucosal edema or nasal deformity. No epistaxis. Right sinus exhibits no  maxillary sinus tenderness and no frontal sinus tenderness. Left sinus exhibits no maxillary sinus tenderness and no frontal sinus tenderness.   Mouth/Throat: Uvula is midline and mucous membranes are normal. No trismus in the jaw. Normal dentition. No uvula swelling. Posterior oropharyngeal erythema present.   Eyes: Conjunctivae and lids are normal. Right eye exhibits no discharge. Left eye exhibits no discharge. No scleral icterus.   Neck: Trachea normal and phonation normal. Neck supple.   Cardiovascular: Normal rate, regular rhythm, normal heart sounds and normal pulses.   Pulmonary/Chest: Effort normal. No respiratory distress. She has wheezes.   Abdominal: Normal appearance and bowel sounds are normal. She exhibits no distension and no mass. Soft. There is no abdominal tenderness.   Musculoskeletal: Normal range of motion.         General: No deformity. Normal range of motion.   Neurological: She is alert and oriented to person, place, and time. She exhibits normal muscle tone. Coordination normal.   Skin: Skin is warm, dry, intact, not diaphoretic and not pale.   Psychiatric: Her speech is normal and behavior is normal. Judgment and thought content normal.   Nursing note and vitals reviewed.        Assessment:       1. Asthmatic bronchitis without complication, unspecified asthma severity, unspecified whether persistent    2. Laryngitis          Plan:         Asthmatic bronchitis without complication, unspecified asthma severity, unspecified whether persistent    Laryngitis    Other orders  -     dexamethasone injection 8 mg  -     amoxicillin-clavulanate 875-125mg (AUGMENTIN) 875-125 mg per tablet; Take 1 tablet by mouth 2 (two) times daily.  Dispense: 14 tablet; Refill: 0  -     predniSONE (DELTASONE) 20 MG tablet; Take 1 tablet (20 mg total) by mouth once daily. for 5 days  Dispense: 5 tablet; Refill: 0  -     benzonatate (TESSALON PERLES) 100 MG capsule; Take 2 capsules (200 mg total) by mouth 3 (three)  times daily as needed.  Dispense: 30 capsule; Refill: 1

## 2022-03-02 ENCOUNTER — HOSPITAL ENCOUNTER (OUTPATIENT)
Dept: RADIOLOGY | Facility: HOSPITAL | Age: 52
Discharge: HOME | End: 2022-03-02
Attending: SPECIALIST
Payer: COMMERCIAL

## 2022-03-02 DIAGNOSIS — Z12.31 ENCOUNTER FOR SCREENING MAMMOGRAM FOR MALIGNANT NEOPLASM OF BREAST: ICD-10-CM

## 2022-03-02 PROCEDURE — 77063 BREAST TOMOSYNTHESIS BI: CPT | Mod: TC,PO

## 2022-03-02 PROCEDURE — 77067 SCR MAMMO BI INCL CAD: CPT | Mod: TC,PO

## 2022-04-29 ENCOUNTER — TELEPHONE (OUTPATIENT)
Dept: FAMILY MEDICINE | Facility: CLINIC | Age: 52
End: 2022-04-29
Payer: COMMERCIAL

## 2022-04-29 NOTE — TELEPHONE ENCOUNTER
Patient has existing appointment with Dr. Aggarwal on 5-3-2022 in [facility]. Provider will not be in clinic on that date. Appointment rescheduled for the date of 5-. Patient agreed to appointment date, time, and location.

## 2022-04-29 NOTE — TELEPHONE ENCOUNTER
----- Message from Brandy Owusu sent at 4/29/2022  8:52 AM CDT -----  Contact: Patient  Type:  Patient Returning Call    Who Called: patient     Who Left Message for Patient: Shannen    Does the patient know what this is regarding?: appt     Would the patient rather a call back or a response via [facility]?  Call    Best Call Back Number: [phone] (home)     Additional Information:  Please call before 1:15

## 2022-05-11 ENCOUNTER — OFFICE VISIT (OUTPATIENT)
Dept: FAMILY MEDICINE | Facility: CLINIC | Age: 52
End: 2022-05-11
Payer: COMMERCIAL

## 2022-05-11 VITALS
OXYGEN SATURATION: 97 % | BODY MASS INDEX: 37.27 KG/M2 | HEIGHT: 60 IN | WEIGHT: 189.81 LBS | HEART RATE: 87 BPM | TEMPERATURE: 99 F | DIASTOLIC BLOOD PRESSURE: 60 MMHG | SYSTOLIC BLOOD PRESSURE: 100 MMHG | RESPIRATION RATE: 16 BRPM

## 2022-05-11 DIAGNOSIS — Z12.11 COLON CANCER SCREENING: Primary | ICD-10-CM

## 2022-05-11 DIAGNOSIS — Z23 NEED FOR SHINGLES VACCINE: ICD-10-CM

## 2022-05-11 PROCEDURE — 3008F BODY MASS INDEX DOCD: CPT | Mod: CPTII,S$GLB,, | Performed by: FAMILY MEDICINE

## 2022-05-11 PROCEDURE — 1159F MED LIST DOCD IN RCRD: CPT | Mod: CPTII,S$GLB,, | Performed by: FAMILY MEDICINE

## 2022-05-11 PROCEDURE — 3078F PR MOST RECENT DIASTOLIC BLOOD PRESSURE < 80 MM HG: ICD-10-PCS | Mod: CPTII,S$GLB,, | Performed by: FAMILY MEDICINE

## 2022-05-11 PROCEDURE — 90471 IMMUNIZATION ADMIN: CPT | Mod: S$GLB,,, | Performed by: FAMILY MEDICINE

## 2022-05-11 PROCEDURE — 3074F SYST BP LT 130 MM HG: CPT | Mod: CPTII,S$GLB,, | Performed by: FAMILY MEDICINE

## 2022-05-11 PROCEDURE — 90471 ZOSTER RECOMBINANT VACCINE: ICD-10-PCS | Mod: S$GLB,,, | Performed by: FAMILY MEDICINE

## 2022-05-11 PROCEDURE — 3008F PR BODY MASS INDEX (BMI) DOCUMENTED: ICD-10-PCS | Mod: CPTII,S$GLB,, | Performed by: FAMILY MEDICINE

## 2022-05-11 PROCEDURE — 99999 PR PBB SHADOW E&M-EST. PATIENT-LVL V: ICD-10-PCS | Mod: PBBFAC,,, | Performed by: FAMILY MEDICINE

## 2022-05-11 PROCEDURE — 3074F PR MOST RECENT SYSTOLIC BLOOD PRESSURE < 130 MM HG: ICD-10-PCS | Mod: CPTII,S$GLB,, | Performed by: FAMILY MEDICINE

## 2022-05-11 PROCEDURE — 99999 PR PBB SHADOW E&M-EST. PATIENT-LVL V: CPT | Mod: PBBFAC,,, | Performed by: FAMILY MEDICINE

## 2022-05-11 PROCEDURE — 99214 PR OFFICE/OUTPT VISIT, EST, LEVL IV, 30-39 MIN: ICD-10-PCS | Mod: 25,S$GLB,, | Performed by: FAMILY MEDICINE

## 2022-05-11 PROCEDURE — 4010F PR ACE/ARB THEARPY RXD/TAKEN: ICD-10-PCS | Mod: CPTII,S$GLB,, | Performed by: FAMILY MEDICINE

## 2022-05-11 PROCEDURE — 99214 OFFICE O/P EST MOD 30 MIN: CPT | Mod: 25,S$GLB,, | Performed by: FAMILY MEDICINE

## 2022-05-11 PROCEDURE — 1159F PR MEDICATION LIST DOCUMENTED IN MEDICAL RECORD: ICD-10-PCS | Mod: CPTII,S$GLB,, | Performed by: FAMILY MEDICINE

## 2022-05-11 PROCEDURE — 3078F DIAST BP <80 MM HG: CPT | Mod: CPTII,S$GLB,, | Performed by: FAMILY MEDICINE

## 2022-05-11 PROCEDURE — 90750 ZOSTER RECOMBINANT VACCINE: ICD-10-PCS | Mod: S$GLB,,, | Performed by: FAMILY MEDICINE

## 2022-05-11 PROCEDURE — 4010F ACE/ARB THERAPY RXD/TAKEN: CPT | Mod: CPTII,S$GLB,, | Performed by: FAMILY MEDICINE

## 2022-05-11 PROCEDURE — 90750 HZV VACC RECOMBINANT IM: CPT | Mod: S$GLB,,, | Performed by: FAMILY MEDICINE

## 2022-05-11 RX ORDER — BUDESONIDE, GLYCOPYRROLATE, AND FORMOTEROL FUMARATE 160; 9; 4.8 UG/1; UG/1; UG/1
AEROSOL, METERED RESPIRATORY (INHALATION)
COMMUNITY
Start: 2022-05-04 | End: 2022-11-02

## 2022-05-11 NOTE — PATIENT INSTRUCTIONS
Celestino Amaro,     If you are due for any health screening(s) below please notify me so we can arrange them to be ordered and scheduled to maintain your health. Most healthy patients complete it. Don't lose out on improving your health.     Health Maintenance   Topic Date Due    Mammogram  03/02/2023    TETANUS VACCINE  07/31/2025    Lipid Panel  10/01/2026    Hepatitis C Screening  Completed          Colon Cancer Screening    Of cancers affecting both men and women, colorectal cancer is the third leading cancer killer in the [place]. But it doesnt have to be. Screening can prevent colorectal cancer or find it at an early stage when treatment often leads to a cure.    A colonoscopy is the preferred test for detecting colon cancer. It is needed only once every 10 years if results are negative. While sedated, a flexible, lighted tube with a tiny camera is inserted into the rectum and advanced through the colon to look for cancers. An alternative screening test that is used at home and returned to the lab may also be used. It detects hidden blood in bowel movements which could indicate cancer in the colon. If results are positive, you will need a colonoscopy to determine if the blood is a sign of cancer. This type of follow up (diagnostic) colonoscopy usually requires additional copays as required by your insurance provider. Please contact your PCP if you have any questions.    Although you are still overdue for this important screening, due to the COVID-19 pandemic, we recommend scheduling it in the near future.

## 2022-05-11 NOTE — PROGRESS NOTES
Subjective:       Patient ID: Sondra Beltran is a 52 y.o. female.    Chief Complaint: Follow-up (6 month)    Patient is a 52 year old female with history of asthma, immune deficiency disorder taking infusions every 4 weeks, presenting for routine health maintenance and to establish care, patient endorses compliance with medication regimen and denies any acute symptoms today. She states that she has lost some weight recently and her blood pressure is low. We are removing her olmesartan today.           Current Outpatient Medications:     albuterol (PROVENTIL) 2.5 mg /3 mL (0.083 %) nebulizer solution, Take 3 mLs (2.5 mg total) by nebulization every 6 (six) hours as needed for Wheezing or Shortness of Breath., Disp: 360 mL, Rfl: 2    ascorbic acid, vitamin C, (VITAMIN C) 500 MG tablet, Take 500 mg by mouth 3 (three) times daily., Disp: , Rfl:     azelastine (ASTELIN) 137 mcg (0.1 %) nasal spray, SMARTSIG:Both Nares, Disp: , Rfl:     BREZTRI AEROSPHERE 160-9-4.8 mcg/actuation HFAA, Inhale into the lungs., Disp: , Rfl:     budesonide-formoterol 160-4.5 mcg (SYMBICORT) 160-4.5 mcg/actuation HFAA, Inhale 2 puffs into the lungs every 12 (twelve) hours. Controller, Disp: , Rfl:     EPINEPHrine (EPIPEN) 0.3 mg/0.3 mL AtIn, SMARTSI.3 Milliliter(s) IM Once PRN, Disp: , Rfl:     levocetirizine (XYZAL) 5 MG tablet, Take 5 mg by mouth every evening., Disp: , Rfl:     pseudoephedrine-guaiFENesin  mg (MUCINEX D)  mg per tablet, Take 1 tablet by mouth once daily., Disp: , Rfl:     selenium 50 mcg Tab, Take 200 mcg by mouth once daily., Disp: , Rfl:     vitamin D 1000 units Tab, Take 185 mg by mouth once daily., Disp: , Rfl:     amoxicillin-clavulanate 875-125mg (AUGMENTIN) 875-125 mg per tablet, Take 1 tablet by mouth 2 (two) times daily. (Patient not taking: Reported on 2022), Disp: 14 tablet, Rfl: 0    clarithromycin (BIAXIN) 500 MG tablet, Take 500 mg by mouth 2 (two) times daily., Disp:  , Rfl:     cyanocobalamin 1,000 mcg/mL injection, INJECT 1 ML (1,000 MCG TOTAL) INTO THE SKIN EVERY 7 DAYS. (Patient not taking: Reported on 5/11/2022), Disp: 30 mL, Rfl: 3    fluticasone propionate (FLONASE) 50 mcg/actuation nasal spray, USE 2 SPRAY(S) IN EACH NOSTRIL TWICE DAILY, Disp: , Rfl:     hydrOXYzine HCL (ATARAX) 25 MG tablet, Take 1 tablet (25 mg total) by mouth 3 (three) times daily as needed for Anxiety. (Patient not taking: Reported on 5/11/2022), Disp: 90 tablet, Rfl: 3    moxifloxacin (AVELOX) 400 mg tablet, Take 400 mg by mouth once daily., Disp: , Rfl:     permethrin (ELIMITE) 5 % cream, Apply topically once., Disp: , Rfl:     SUTAB 1.479-0.188- 0.225 gram tablet, TAKE 1 DOSE PACK BY MOUTH, Disp: , Rfl:     tiotropium bromide (SPIRIVA RESPIMAT) 1.25 mcg/actuation inhaler, Inhale 2 puffs into the lungs once daily. Controller (Patient not taking: Reported on 5/11/2022), Disp: 4 g, Rfl: 0    valACYclovir (VALTREX) 1000 MG tablet, TAKE 2 TABLETS BY MOUTH TWICE DAILY TAKE  AS  NEEDED  FOR  FEVER  BLISTERS  FOR  ONE  DAY (Patient not taking: Reported on 5/11/2022), Disp: 4 tablet, Rfl: 0    Current Facility-Administered Medications:     LIDOcaine (PF) 20 mg/ml (2%) injection 20 mg, 1 mL, Other, 1 time in Clinic/HOD, Padmini Roy DPM    Review of patient's allergies indicates:   Allergen Reactions    Sulfamethoxazole-trimethoprim Rash and Hives       Past Medical History:   Diagnosis Date    Asthma     Benign essential HTN 6/19/2017    CVID (common variable immunodeficiency)     Neck strain 5/17/2016    Thyroid nodule 6/28/2017       Past Surgical History:   Procedure Laterality Date    cervial cone surgery      CLOSURE OF VESICOVAGINAL FISTULA N/A 6/6/2019    Procedure: excision of vaginal lesion vs urethral diverticulectomy;  Surgeon: Sondra Cobb MD;  [facility];  Service: Urology;  Laterality: N/A;   to assist, first case, please put  as  co-surgeon    CYSTOSCOPY N/A 4/22/2019    Procedure: CYSTOSCOPY;  Surgeon: Sondra Cobb MD;  Location: [facility] OR;  Service: Urology;  Laterality: N/A;    HYSTERECTOMY      KNEE SURGERY      nose surgery      VAGINOSCOPY N/A 4/22/2019    Procedure: VAGINOSCOPY;  Surgeon: Sondra Cobb MD;  Location: [facility] OR;  Service: Urology;  Laterality: N/A;       Family History   Problem Relation Age of Onset    Diabetes Mother     Hypertension Mother     Heart disease Mother     Rheum arthritis Mother     Heart disease Father     Hypertension Father        Social History     Tobacco Use    Smoking status: Never Smoker    Smokeless tobacco: Never Used   Substance Use Topics    Alcohol use: No    Drug use: No       Review of Systems   Constitutional: Negative for activity change, appetite change, chills, diaphoresis, fatigue, fever and unexpected weight change.   HENT: Negative for hearing loss, postnasal drip, rhinorrhea, sinus pressure/congestion, sore throat and trouble swallowing.    Eyes: Negative for visual disturbance.   Respiratory: Negative for apnea, chest tightness, shortness of breath and wheezing.    Cardiovascular: Negative for chest pain.   Gastrointestinal: Negative for abdominal pain, blood in stool, change in bowel habit, constipation, diarrhea, nausea, vomiting and change in bowel habit.   Endocrine: Negative for polydipsia and polyphagia.   Genitourinary: Negative for dysuria, enuresis, flank pain, frequency and urgency.   Integumentary:  Negative for rash and mole/lesion.   Neurological: Negative for dizziness, light-headedness, headaches and memory loss.   Psychiatric/Behavioral: Negative for confusion, decreased concentration, sleep disturbance and suicidal ideas. The patient is not nervous/anxious.            Objective:      Vitals:    05/11/22 1117   BP: 100/60   BP Location: Right arm   Patient Position: Sitting   BP Method: Large (Manual)   Pulse: 87   Resp: 16   Temp:  98.9 °F (37.2 °C)   TempSrc: Oral   SpO2: 97%   Weight: 86.1 kg (189 lb 13.1 oz)   Height: 5' (1.524 m)     Physical Exam  Constitutional:       General: She is not in acute distress.     Appearance: She is normal weight. She is not ill-appearing, toxic-appearing or diaphoretic.   HENT:      Head: Normocephalic and atraumatic.      Right Ear: External ear normal.      Left Ear: External ear normal.      Nose: Nose normal. No congestion.      Mouth/Throat:      Mouth: Mucous membranes are moist.      Pharynx: Oropharynx is clear. No oropharyngeal exudate or posterior oropharyngeal erythema.   Eyes:      General: No scleral icterus.     Extraocular Movements: Extraocular movements intact.      Conjunctiva/sclera: Conjunctivae normal.   Cardiovascular:      Rate and Rhythm: Normal rate and regular rhythm.      Pulses: Normal pulses.      Heart sounds: Normal heart sounds. No murmur heard.    No friction rub. No gallop.   Pulmonary:      Effort: Pulmonary effort is normal. No respiratory distress.      Breath sounds: Normal breath sounds. No stridor. No wheezing, rhonchi or rales.   Chest:      Chest wall: No tenderness.   Abdominal:      General: Abdomen is flat. Bowel sounds are normal. There is no distension.      Palpations: Abdomen is soft. There is no mass.      Tenderness: There is no abdominal tenderness. There is no guarding or rebound.      Hernia: No hernia is present.   Musculoskeletal:      Cervical back: Normal range of motion.      Right lower leg: No edema.      Left lower leg: No edema.   Skin:     Coloration: Skin is not jaundiced or pale.      Findings: No bruising, erythema or rash.   Neurological:      General: No focal deficit present.      Mental Status: She is alert and oriented to person, place, and time. Mental status is at baseline.   Psychiatric:         Mood and Affect: Mood normal.         Behavior: Behavior normal.         Thought Content: Thought content normal.         Judgment: Judgment  normal.           Assessment:       1. Colon cancer screening    2. Need for shingles vaccine        Plan:           Colon cancer screening  -     Case Request Endoscopy: COLONOSCOPY    Need for shingles vaccine  -     (In Office Administered) Zoster Recombinant Vaccine        Follow up in about 6 months (around 11/11/2022).    Abdulkadir Aggarwal MD     I spent a total of 30 minutes on the day of the visit.  This includes face-to-face time and non face-to-face time preparing to see the patient (e.g., review of tests) , obtaining and/or reviewing separately obtain history, documenting clinical information in the electronic or other health record, independently interpreting results and communicating results to the patient/family/caregiver, or care coordinator.      DISCLAIMER: This note was prepared with [employer] Naturally Speaking voice recognition transcription software. Garbled syntax, mangled pronouns, and other bizarre constructions may be attributed to that software system.

## 2022-05-12 ENCOUNTER — PATIENT MESSAGE (OUTPATIENT)
Dept: GASTROENTEROLOGY | Facility: CLINIC | Age: 52
End: 2022-05-12
Payer: COMMERCIAL

## 2022-11-02 ENCOUNTER — LAB VISIT (OUTPATIENT)
Dept: LAB | Facility: HOSPITAL | Age: 52
End: 2022-11-02
Attending: NURSE PRACTITIONER
Payer: COMMERCIAL

## 2022-11-02 ENCOUNTER — OFFICE VISIT (OUTPATIENT)
Dept: FAMILY MEDICINE | Facility: CLINIC | Age: 52
End: 2022-11-02
Payer: COMMERCIAL

## 2022-11-02 VITALS
HEIGHT: 61 IN | HEART RATE: 62 BPM | TEMPERATURE: 98 F | SYSTOLIC BLOOD PRESSURE: 132 MMHG | OXYGEN SATURATION: 98 % | WEIGHT: 183 LBS | DIASTOLIC BLOOD PRESSURE: 68 MMHG | BODY MASS INDEX: 34.55 KG/M2

## 2022-11-02 DIAGNOSIS — E66.9 OBESITY (BMI 30-39.9): ICD-10-CM

## 2022-11-02 DIAGNOSIS — I10 BENIGN ESSENTIAL HTN: ICD-10-CM

## 2022-11-02 DIAGNOSIS — J30.9 CHRONIC ALLERGIC RHINITIS: ICD-10-CM

## 2022-11-02 DIAGNOSIS — Z00.00 ANNUAL PHYSICAL EXAM: Primary | ICD-10-CM

## 2022-11-02 DIAGNOSIS — D83.9 CVID (COMMON VARIABLE IMMUNODEFICIENCY): ICD-10-CM

## 2022-11-02 DIAGNOSIS — J45.50 SEVERE PERSISTENT ASTHMA WITHOUT COMPLICATION: ICD-10-CM

## 2022-11-02 DIAGNOSIS — D84.9 IMMUNE DEFICIENCY DISORDER: ICD-10-CM

## 2022-11-02 DIAGNOSIS — Z23 FLU VACCINE NEED: ICD-10-CM

## 2022-11-02 DIAGNOSIS — Z00.00 ANNUAL PHYSICAL EXAM: ICD-10-CM

## 2022-11-02 LAB
ALBUMIN SERPL BCP-MCNC: 4.1 G/DL (ref 3.5–5.2)
ALP SERPL-CCNC: 104 U/L (ref 55–135)
ALT SERPL W/O P-5'-P-CCNC: 32 U/L (ref 10–44)
ANION GAP SERPL CALC-SCNC: 14 MMOL/L (ref 8–16)
AST SERPL-CCNC: 24 U/L (ref 10–40)
BASOPHILS # BLD AUTO: 0.02 K/UL (ref 0–0.2)
BASOPHILS NFR BLD: 0.3 % (ref 0–1.9)
BILIRUB SERPL-MCNC: 1.9 MG/DL (ref 0.1–1)
BUN SERPL-MCNC: 13 MG/DL (ref 6–20)
CALCIUM SERPL-MCNC: 9.4 MG/DL (ref 8.7–10.5)
CHLORIDE SERPL-SCNC: 104 MMOL/L (ref 95–110)
CHOLEST SERPL-MCNC: 208 MG/DL (ref 120–199)
CHOLEST/HDLC SERPL: 3.3 {RATIO} (ref 2–5)
CO2 SERPL-SCNC: 21 MMOL/L (ref 23–29)
CREAT SERPL-MCNC: 0.6 MG/DL (ref 0.5–1.4)
DIFFERENTIAL METHOD: NORMAL
EOSINOPHIL # BLD AUTO: 0.1 K/UL (ref 0–0.5)
EOSINOPHIL NFR BLD: 0.6 % (ref 0–8)
ERYTHROCYTE [DISTWIDTH] IN BLOOD BY AUTOMATED COUNT: 13.1 % (ref 11.5–14.5)
EST. GFR  (NO RACE VARIABLE): >60 ML/MIN/1.73 M^2
ESTIMATED AVG GLUCOSE: 103 MG/DL (ref 68–131)
GLUCOSE SERPL-MCNC: 91 MG/DL (ref 70–110)
HBA1C MFR BLD: 5.2 % (ref 4–5.6)
HCT VFR BLD AUTO: 41.8 % (ref 37–48.5)
HDLC SERPL-MCNC: 64 MG/DL (ref 40–75)
HDLC SERPL: 30.8 % (ref 20–50)
HGB BLD-MCNC: 13.6 G/DL (ref 12–16)
IMM GRANULOCYTES # BLD AUTO: 0.02 K/UL (ref 0–0.04)
IMM GRANULOCYTES NFR BLD AUTO: 0.3 % (ref 0–0.5)
LDLC SERPL CALC-MCNC: 129.4 MG/DL (ref 63–159)
LYMPHOCYTES # BLD AUTO: 2.5 K/UL (ref 1–4.8)
LYMPHOCYTES NFR BLD: 31 % (ref 18–48)
MCH RBC QN AUTO: 29.6 PG (ref 27–31)
MCHC RBC AUTO-ENTMCNC: 32.5 G/DL (ref 32–36)
MCV RBC AUTO: 91 FL (ref 82–98)
MONOCYTES # BLD AUTO: 0.4 K/UL (ref 0.3–1)
MONOCYTES NFR BLD: 5.3 % (ref 4–15)
NEUTROPHILS # BLD AUTO: 5 K/UL (ref 1.8–7.7)
NEUTROPHILS NFR BLD: 62.5 % (ref 38–73)
NONHDLC SERPL-MCNC: 144 MG/DL
NRBC BLD-RTO: 0 /100 WBC
PLATELET # BLD AUTO: 270 K/UL (ref 150–450)
PMV BLD AUTO: 12.1 FL (ref 9.2–12.9)
POTASSIUM SERPL-SCNC: 4.1 MMOL/L (ref 3.5–5.1)
PROT SERPL-MCNC: 6.6 G/DL (ref 6–8.4)
RBC # BLD AUTO: 4.59 M/UL (ref 4–5.4)
SODIUM SERPL-SCNC: 139 MMOL/L (ref 136–145)
TRIGL SERPL-MCNC: 73 MG/DL (ref 30–150)
TSH SERPL DL<=0.005 MIU/L-ACNC: 0.8 UIU/ML (ref 0.4–4)
WBC # BLD AUTO: 7.91 K/UL (ref 3.9–12.7)

## 2022-11-02 PROCEDURE — 99214 OFFICE O/P EST MOD 30 MIN: CPT | Mod: 25,S$GLB,, | Performed by: NURSE PRACTITIONER

## 2022-11-02 PROCEDURE — 85025 COMPLETE CBC W/AUTO DIFF WBC: CPT | Performed by: NURSE PRACTITIONER

## 2022-11-02 PROCEDURE — 3075F SYST BP GE 130 - 139MM HG: CPT | Mod: CPTII,S$GLB,, | Performed by: NURSE PRACTITIONER

## 2022-11-02 PROCEDURE — 84443 ASSAY THYROID STIM HORMONE: CPT | Performed by: NURSE PRACTITIONER

## 2022-11-02 PROCEDURE — 36415 COLL VENOUS BLD VENIPUNCTURE: CPT | Mod: PO | Performed by: NURSE PRACTITIONER

## 2022-11-02 PROCEDURE — 1159F PR MEDICATION LIST DOCUMENTED IN MEDICAL RECORD: ICD-10-PCS | Mod: CPTII,S$GLB,, | Performed by: NURSE PRACTITIONER

## 2022-11-02 PROCEDURE — 3075F PR MOST RECENT SYSTOLIC BLOOD PRESS GE 130-139MM HG: ICD-10-PCS | Mod: CPTII,S$GLB,, | Performed by: NURSE PRACTITIONER

## 2022-11-02 PROCEDURE — 90686 FLU VACCINE (QUAD) GREATER THAN OR EQUAL TO 3YO PRESERVATIVE FREE IM: ICD-10-PCS | Mod: S$GLB,,, | Performed by: NURSE PRACTITIONER

## 2022-11-02 PROCEDURE — 1160F PR REVIEW ALL MEDS BY PRESCRIBER/CLIN PHARMACIST DOCUMENTED: ICD-10-PCS | Mod: CPTII,S$GLB,, | Performed by: NURSE PRACTITIONER

## 2022-11-02 PROCEDURE — 4010F ACE/ARB THERAPY RXD/TAKEN: CPT | Mod: CPTII,S$GLB,, | Performed by: NURSE PRACTITIONER

## 2022-11-02 PROCEDURE — 1160F RVW MEDS BY RX/DR IN RCRD: CPT | Mod: CPTII,S$GLB,, | Performed by: NURSE PRACTITIONER

## 2022-11-02 PROCEDURE — 90471 FLU VACCINE (QUAD) GREATER THAN OR EQUAL TO 3YO PRESERVATIVE FREE IM: ICD-10-PCS | Mod: S$GLB,,, | Performed by: NURSE PRACTITIONER

## 2022-11-02 PROCEDURE — 83036 HEMOGLOBIN GLYCOSYLATED A1C: CPT | Performed by: NURSE PRACTITIONER

## 2022-11-02 PROCEDURE — 3078F DIAST BP <80 MM HG: CPT | Mod: CPTII,S$GLB,, | Performed by: NURSE PRACTITIONER

## 2022-11-02 PROCEDURE — 1159F MED LIST DOCD IN RCRD: CPT | Mod: CPTII,S$GLB,, | Performed by: NURSE PRACTITIONER

## 2022-11-02 PROCEDURE — 3078F PR MOST RECENT DIASTOLIC BLOOD PRESSURE < 80 MM HG: ICD-10-PCS | Mod: CPTII,S$GLB,, | Performed by: NURSE PRACTITIONER

## 2022-11-02 PROCEDURE — 4010F PR ACE/ARB THEARPY RXD/TAKEN: ICD-10-PCS | Mod: CPTII,S$GLB,, | Performed by: NURSE PRACTITIONER

## 2022-11-02 PROCEDURE — 3008F BODY MASS INDEX DOCD: CPT | Mod: CPTII,S$GLB,, | Performed by: NURSE PRACTITIONER

## 2022-11-02 PROCEDURE — 90471 IMMUNIZATION ADMIN: CPT | Mod: S$GLB,,, | Performed by: NURSE PRACTITIONER

## 2022-11-02 PROCEDURE — 99999 PR PBB SHADOW E&M-EST. PATIENT-LVL IV: CPT | Mod: PBBFAC,,, | Performed by: NURSE PRACTITIONER

## 2022-11-02 PROCEDURE — 80061 LIPID PANEL: CPT | Performed by: NURSE PRACTITIONER

## 2022-11-02 PROCEDURE — 3008F PR BODY MASS INDEX (BMI) DOCUMENTED: ICD-10-PCS | Mod: CPTII,S$GLB,, | Performed by: NURSE PRACTITIONER

## 2022-11-02 PROCEDURE — 99214 PR OFFICE/OUTPT VISIT, EST, LEVL IV, 30-39 MIN: ICD-10-PCS | Mod: 25,S$GLB,, | Performed by: NURSE PRACTITIONER

## 2022-11-02 PROCEDURE — 90686 IIV4 VACC NO PRSV 0.5 ML IM: CPT | Mod: S$GLB,,, | Performed by: NURSE PRACTITIONER

## 2022-11-02 PROCEDURE — 80053 COMPREHEN METABOLIC PANEL: CPT | Performed by: NURSE PRACTITIONER

## 2022-11-02 PROCEDURE — 99999 PR PBB SHADOW E&M-EST. PATIENT-LVL IV: ICD-10-PCS | Mod: PBBFAC,,, | Performed by: NURSE PRACTITIONER

## 2022-11-02 RX ORDER — FLUTICASONE PROPIONATE 50 MCG
2 SPRAY, SUSPENSION (ML) NASAL 2 TIMES DAILY
Qty: 9.9 G | Refills: 3 | Status: SHIPPED | OUTPATIENT
Start: 2022-11-02 | End: 2024-01-05 | Stop reason: SDUPTHER

## 2022-11-02 RX ORDER — TIOTROPIUM BROMIDE INHALATION SPRAY 1.56 UG/1
2.5 SPRAY, METERED RESPIRATORY (INHALATION) DAILY
Qty: 4 G | Refills: 0 | Status: SHIPPED | OUTPATIENT
Start: 2022-11-02 | End: 2023-12-14 | Stop reason: SDUPTHER

## 2022-11-02 RX ORDER — AZELASTINE 1 MG/ML
SPRAY, METERED NASAL
Qty: 30 ML | Refills: 3 | OUTPATIENT
Start: 2022-11-02 | End: 2022-11-02 | Stop reason: SDUPTHER

## 2022-11-02 RX ORDER — BUDESONIDE AND FORMOTEROL FUMARATE DIHYDRATE 160; 4.5 UG/1; UG/1
2 AEROSOL RESPIRATORY (INHALATION) EVERY 12 HOURS
Qty: 6 G | Refills: 3 | Status: SHIPPED | OUTPATIENT
Start: 2022-11-02 | End: 2024-01-05 | Stop reason: SDUPTHER

## 2022-11-02 RX ORDER — CETIRIZINE HYDROCHLORIDE 10 MG/1
10 TABLET ORAL NIGHTLY
COMMUNITY
Start: 2022-10-15 | End: 2023-02-14

## 2022-11-02 NOTE — PATIENT INSTRUCTIONS
Celestino Amaro,     If you are due for any health screening(s) below please notify me so we can arrange them to be ordered and scheduled to maintain your health. Most healthy patients complete it. Don't lose out on improving your health.     Tests to Keep You Healthy    Mammogram: Met on 3/2/2022  Colon Cancer Screening: DUE  Last Blood Pressure <= 139/89 (11/2/2022): Yes         Colon Cancer Screening    Of cancers affecting both men and women, colorectal cancer is the third leading cancer killer in the [place]. But it doesnt have to be. Screening can prevent colorectal cancer or find it at an early stage when treatment often leads to a cure.    A colonoscopy is the preferred test for detecting colon cancer. It is needed only once every 10 years if results are negative. While sedated, a flexible, lighted tube with a tiny camera is inserted into the rectum and advanced through the colon to look for cancers. An alternative screening test that is used at home and returned to the lab may also be used. It detects hidden blood in bowel movements which could indicate cancer in the colon. If results are positive, you will need a colonoscopy to determine if the blood is a sign of cancer. This type of follow up (diagnostic) colonoscopy usually requires additional copays as required by your insurance provider. Please contact your PCP if you have any questions.    Although you are still overdue for this important screening, due to the COVID-19 pandemic, we recommend scheduling it in the near future.

## 2022-11-02 NOTE — TELEPHONE ENCOUNTER
"----- Message from Amanda Azevedo sent at 11/2/2022 12:09 PM CDT -----  Contact: pt  Type: Needs Medical Advice         Who Called: pt   Best Call Back Number:[phone]  Additional Information: Requesting a call back regarding pt said her pharm did not rec rx for azelastine (ASTELIN) 137 mcg (0.1 %) nasal spray. Chart has class "print" Pt stated that she did not receive a hard copy either.  Pt has to come back for labs and asking if she can pick it up then or office can call in to her pharm.      [facility] [address] [place] [address], [place] - [address]  [address]  [place] [address]  Phone: [phone] Fax: [phone]    .  Please Advise- Thank you       "

## 2022-11-02 NOTE — PROGRESS NOTES
Subjective:       Patient ID: Sondra Beltran is a 52 y.o. female.    Chief Complaint: Follow-up (6 month follow up)    HPI    Patient presents today for chronic conditions follow up. She is new to me. Last office visit with  5/11/22. history of asthma, immune deficiency disorder taking infusions every 4 weeks-have not had infusions ub 3 months due to insurance change. Patient is followed by  allergist-immunologist Dr. Maria Barnett for CVID. Decline Colonoscopy at this time- will schedule in May 2023.  Past Medical History:   Diagnosis Date    Asthma     Benign essential HTN 6/19/2017    CVID (common variable immunodeficiency)     Neck strain 5/17/2016    Thyroid nodule 6/28/2017       Review of patient's allergies indicates:   Allergen Reactions    Sulfamethoxazole-trimethoprim Rash and Hives         Current Outpatient Medications:     ascorbic acid, vitamin C, (VITAMIN C) 500 MG tablet, Take 500 mg by mouth 3 (three) times daily., Disp: , Rfl:     azelastine (ASTELIN) 137 mcg (0.1 %) nasal spray, SMARTSIG:Both Nares Strength: 137 mcg (0.1 %), Disp: 30 mL, Rfl: 3    budesonide-formoterol 160-4.5 mcg (SYMBICORT) 160-4.5 mcg/actuation HFAA, Inhale 2 puffs into the lungs every 12 (twelve) hours. Controller, Disp: 6 g, Rfl: 3    cetirizine (ZYRTEC) 10 MG tablet, Take 10 mg by mouth every evening., Disp: , Rfl:     selenium 50 mcg Tab, Take 200 mcg by mouth once daily., Disp: , Rfl:     tiotropium bromide (SPIRIVA RESPIMAT) 1.25 mcg/actuation inhaler, Inhale 2 puffs into the lungs once daily. Controller, Disp: 4 g, Rfl: 0    vitamin D 1000 units Tab, Take 185 mg by mouth once daily., Disp: , Rfl:     albuterol (PROVENTIL) 2.5 mg /3 mL (0.083 %) nebulizer solution, Take 3 mLs (2.5 mg total) by nebulization every 6 (six) hours as needed for Wheezing or Shortness of Breath., Disp: 360 mL, Rfl: 2    fluticasone propionate (FLONASE) 50 mcg/actuation nasal spray, 2 sprays (100 mcg total) by Each Nostril route  "2 (two) times daily., Disp: 9.9 g, Rfl: 3    levocetirizine (XYZAL) 5 MG tablet, Take 5 mg by mouth every evening., Disp: , Rfl:     Current Facility-Administered Medications:     LIDOcaine (PF) 20 mg/ml (2%) injection 20 mg, 1 mL, Other, 1 time in Clinic/HOD, Padmini Roy DPM    Review of Systems   Constitutional:  Negative for unexpected weight change.   HENT:  Negative for trouble swallowing.    Eyes:  Negative for visual disturbance.   Respiratory:  Negative for shortness of breath.    Cardiovascular:  Negative for chest pain, palpitations and leg swelling.   Gastrointestinal:  Negative for blood in stool.   Genitourinary:  Negative for hematuria.   Skin:  Negative for rash.   Allergic/Immunologic: Negative for immunocompromised state.   Neurological:  Negative for headaches.   Hematological:  Does not bruise/bleed easily.   Psychiatric/Behavioral:  Negative for agitation. The patient is not nervous/anxious.      Objective:      /68 (BP Location: Right arm, Patient Position: Sitting, BP Method: Small (Manual))   Pulse 62   Temp 97.9 °F (36.6 °C) (Oral)   Ht 5' 1" (1.549 m)   Wt 83 kg (182 lb 15.7 oz)   SpO2 98%   BMI 34.57 kg/m²   Physical Exam  Constitutional:       Appearance: She is well-developed. She is obese.   Eyes:      Pupils: Pupils are equal, round, and reactive to light.   Cardiovascular:      Rate and Rhythm: Normal rate and regular rhythm.      Heart sounds: Normal heart sounds.   Pulmonary:      Effort: Pulmonary effort is normal.      Breath sounds: Normal breath sounds.   Musculoskeletal:         General: Normal range of motion.      Cervical back: Normal range of motion.   Skin:     General: Skin is warm and dry.   Neurological:      Mental Status: She is alert and oriented to person, place, and time.   Psychiatric:         Behavior: Behavior normal.         Thought Content: Thought content normal.         Judgment: Judgment normal.       Assessment:       1. Annual physical exam  "   2. Benign essential HTN    3. Severe persistent asthma without complication    4. Chronic allergic rhinitis    5. Immune deficiency disorder    6. Flu vaccine need    7. CVID (common variable immunodeficiency)    8. Obesity (BMI 30-39.9)        Plan:       Annual physical exam  -     CBC W/ AUTO DIFFERENTIAL; Future; Expected date: 11/02/2022  -     COMPREHENSIVE METABOLIC PANEL; Future; Expected date: 11/02/2022  -     Hemoglobin A1C; Future; Expected date: 11/02/2022  -     TSH; Future; Expected date: 11/02/2022  -     Lipid Panel; Future; Expected date: 11/02/2022    Benign essential HTN  -     COMPREHENSIVE METABOLIC PANEL; Future; Expected date: 11/02/2022  Stable, continue management  Low sodium diet  BP Readings from Last 3 Encounters:   11/02/22 132/68   05/11/22 100/60   02/19/22 132/79      Severe persistent asthma without complication  -     budesonide-formoterol 160-4.5 mcg (SYMBICORT) 160-4.5 mcg/actuation HFAA; Inhale 2 puffs into the lungs every 12 (twelve) hours. Controller  Dispense: 6 g; Refill: 3  -     tiotropium bromide (SPIRIVA RESPIMAT) 1.25 mcg/actuation inhaler; Inhale 2 puffs into the lungs once daily. Controller  Dispense: 4 g; Refill: 0  Stable, continue management  Chronic allergic rhinitis  -     azelastine (ASTELIN) 137 mcg (0.1 %) nasal spray; SMARTSIG:Both Nares  Strength: 137 mcg (0.1 %)  Dispense: 30 mL; Refill: 3  -     fluticasone propionate (FLONASE) 50 mcg/actuation nasal spray; 2 sprays (100 mcg total) by Each Nostril route 2 (two) times daily.  Dispense: 9.9 g; Refill: 3    Immune deficiency disorder  Stable, continue follow with allergist-immunologist  Flu vaccine need  -     Influenza - Quadrivalent *Preferred* (6 months+) (PF)    CVID (common variable immunodeficiency)  table, continue follow with allergist-immunologist  Obesity (BMI 30-39.9)    Counseled patient on his ideal body weight, health consequences of being obese and current recommendations including weekly  "exercise and a heart healthy diet.  Current BMI is:Estimated body mass index is 34.57 kg/m² as calculated from the following:    Height as of this encounter: 5' 1" (1.549 m).    Weight as of this encounter: 83 kg (182 lb 15.7 oz)..  Patient is aware that ideal BMI < 25 or Weight in (lb) to have BMI = 25: 132.       Patient readiness: acceptance and barriers:none    During the course of the visit the patient was educated and counseled about the following:     Hypertension:   Dietary sodium restriction.  Regular aerobic exercise.  Obesity:   General weight loss/lifestyle modification strategies discussed (elicit support from others; identify saboteurs; non-food rewards, etc).  Regular aerobic exercise program discussed.    Goals: Hypertension: Reduce Blood Pressure and Obesity: Reduce calorie intake and BMI    Did patient meet goals/outcomes: Yes    The following self management tools provided: declined    Patient Instructions (the written plan) was given to the patient/family.     Time spent with patient: 15 minutes    Barriers to medications present (no )    Adverse reactions to current medications (no)    Over the counter medications reviewed (Yes)            "

## 2022-11-03 RX ORDER — AZELASTINE 1 MG/ML
SPRAY, METERED NASAL
Qty: 30 ML | Refills: 3 | Status: SHIPPED | OUTPATIENT
Start: 2022-11-03 | End: 2024-01-05 | Stop reason: SDUPTHER

## 2022-12-09 ENCOUNTER — TELEPHONE (OUTPATIENT)
Dept: FAMILY MEDICINE | Facility: CLINIC | Age: 52
End: 2022-12-09
Payer: COMMERCIAL

## 2022-12-09 NOTE — TELEPHONE ENCOUNTER
----- Message from Alicia Kitchen sent at 12/9/2022  1:28 PM CST -----  Contact: self  Patient did receive a call from your staff and was given her lab results, stating everything looked good.  But when she went to her asmtha and immunology doctor she was told that her live enzyme levels looked high.  Would you please call back at [phone] to advise.  Please leave a msg when at work she can't answer the phone.   thanks

## 2022-12-12 NOTE — TELEPHONE ENCOUNTER
Please inform patient that the the labs I ordered shows her Liver levels AST and ALT are in normal range.    The labs that were ordered by Maria Barnett were elevated-ask patient did this provider addressed the elevated liver levels.

## 2023-02-07 ENCOUNTER — TELEPHONE (OUTPATIENT)
Dept: FAMILY MEDICINE | Facility: CLINIC | Age: 53
End: 2023-02-07
Payer: COMMERCIAL

## 2023-02-07 DIAGNOSIS — Z12.31 ENCOUNTER FOR SCREENING MAMMOGRAM FOR MALIGNANT NEOPLASM OF BREAST: Primary | ICD-10-CM

## 2023-02-07 NOTE — TELEPHONE ENCOUNTER
----- Message from Valerie Delgadillo sent at 2/7/2023 12:28 PM CST -----  Contact: pt  Type: Needs Medical Advice  Who Called: pt  Pharmacy name and phone #:    [facility] - [place], [place] - [address]  [address]  [place] [address]  Phone: [phone] Fax: [phone]    Best Call Back Number: [phone]  Additional Information: pt would like to see if she can get a refill for her med before her apt on the 14th. She was taking bp medication. Please leave message please call back and advise

## 2023-02-07 NOTE — TELEPHONE ENCOUNTER
----- Message from Valerie Delgadillo sent at 2/7/2023 12:28 PM CST -----  Contact: pt  Type: Needs Medical Advice  Who Called: pt  Pharmacy name and phone #:    [facility] - [place], [place] - [address]  [address]  [place] [address]  Phone: [phone] Fax: [phone]    Best Call Back Number: [phone]  Additional Information: pt would like to see if she can get a refill for her med before her apt on the 14th. She was taking bp medication. Please leave message please call back and advise

## 2023-02-07 NOTE — TELEPHONE ENCOUNTER
Left voice mail, we are unable to fill a B/P medication, there is not one in her profile, enc her to call her previous provider

## 2023-02-14 ENCOUNTER — OFFICE VISIT (OUTPATIENT)
Dept: FAMILY MEDICINE | Facility: CLINIC | Age: 53
End: 2023-02-14
Payer: COMMERCIAL

## 2023-02-14 VITALS
BODY MASS INDEX: 31.77 KG/M2 | TEMPERATURE: 97 F | DIASTOLIC BLOOD PRESSURE: 70 MMHG | WEIGHT: 186.06 LBS | OXYGEN SATURATION: 97 % | SYSTOLIC BLOOD PRESSURE: 132 MMHG | HEART RATE: 76 BPM | HEIGHT: 64 IN

## 2023-02-14 DIAGNOSIS — I10 BENIGN ESSENTIAL HTN: Primary | ICD-10-CM

## 2023-02-14 DIAGNOSIS — D83.9 CVID (COMMON VARIABLE IMMUNODEFICIENCY): ICD-10-CM

## 2023-02-14 DIAGNOSIS — J45.50 SEVERE PERSISTENT ASTHMA WITHOUT COMPLICATION: ICD-10-CM

## 2023-02-14 DIAGNOSIS — F41.9 ANXIETY: ICD-10-CM

## 2023-02-14 DIAGNOSIS — D84.9 IMMUNE DEFICIENCY DISORDER: ICD-10-CM

## 2023-02-14 PROCEDURE — 1160F PR REVIEW ALL MEDS BY PRESCRIBER/CLIN PHARMACIST DOCUMENTED: ICD-10-PCS | Mod: ,,,

## 2023-02-14 PROCEDURE — 3078F DIAST BP <80 MM HG: CPT | Mod: ,,,

## 2023-02-14 PROCEDURE — 3075F SYST BP GE 130 - 139MM HG: CPT | Mod: ,,,

## 2023-02-14 PROCEDURE — 99214 OFFICE O/P EST MOD 30 MIN: CPT | Mod: ,,,

## 2023-02-14 PROCEDURE — 3078F PR MOST RECENT DIASTOLIC BLOOD PRESSURE < 80 MM HG: ICD-10-PCS | Mod: ,,,

## 2023-02-14 PROCEDURE — 3075F PR MOST RECENT SYSTOLIC BLOOD PRESS GE 130-139MM HG: ICD-10-PCS | Mod: ,,,

## 2023-02-14 PROCEDURE — 99214 PR OFFICE/OUTPT VISIT, EST, LEVL IV, 30-39 MIN: ICD-10-PCS | Mod: ,,,

## 2023-02-14 PROCEDURE — 1160F RVW MEDS BY RX/DR IN RCRD: CPT | Mod: ,,,

## 2023-02-14 PROCEDURE — 3008F BODY MASS INDEX DOCD: CPT | Mod: ,,,

## 2023-02-14 PROCEDURE — 3008F PR BODY MASS INDEX (BMI) DOCUMENTED: ICD-10-PCS | Mod: ,,,

## 2023-02-14 PROCEDURE — 1159F MED LIST DOCD IN RCRD: CPT | Mod: ,,,

## 2023-02-14 PROCEDURE — 1159F PR MEDICATION LIST DOCUMENTED IN MEDICAL RECORD: ICD-10-PCS | Mod: ,,,

## 2023-02-14 PROCEDURE — 4010F ACE/ARB THERAPY RXD/TAKEN: CPT | Mod: ,,,

## 2023-02-14 PROCEDURE — 4010F PR ACE/ARB THEARPY RXD/TAKEN: ICD-10-PCS | Mod: ,,,

## 2023-02-14 RX ORDER — HYDROXYZINE HYDROCHLORIDE 25 MG/1
25 TABLET, FILM COATED ORAL 3 TIMES DAILY
Qty: 30 TABLET | Refills: 3 | Status: SHIPPED | OUTPATIENT
Start: 2023-02-14 | End: 2024-01-05

## 2023-02-14 RX ORDER — MONTELUKAST SODIUM 10 MG/1
10 TABLET ORAL
COMMUNITY
Start: 2023-01-30

## 2023-02-14 RX ORDER — OLMESARTAN MEDOXOMIL 5 MG/1
5 TABLET ORAL DAILY
Qty: 90 TABLET | Refills: 1 | Status: SHIPPED | OUTPATIENT
Start: 2023-02-14 | End: 2023-05-24 | Stop reason: SDUPTHER

## 2023-02-14 RX ORDER — SODIUM PICOSULFATE, MAGNESIUM OXIDE, AND ANHYDROUS CITRIC ACID 10; 3.5; 12 MG/160ML; G/160ML; G/160ML
LIQUID ORAL
COMMUNITY
Start: 2023-02-09 | End: 2024-01-05

## 2023-02-14 NOTE — PROGRESS NOTES
Subjective:       Patient ID: Sondra Beltran is a 52 y.o. female.    Chief Complaint: Hypertension ( Restarted her bp medication x 3 months ago was off due to low bp readings. Will soon restart infusion.)    Patient presents to the clinic to establish care.     Patient Active Problem List:     Obesity (BMI 30-39.9)     Immune deficiency disorder     Borderline abnormal TFTs     Benign essential HTN     Thyroid nodule     Urethral diverticulum     Vaginal lesion     Severe persistent asthma without complication     Lung nodules     CVID (common variable immunodeficiency)    Hypertension-  BP Readings from Last 3 Encounters:  02/14/23 : 132/70  11/02/22 : 132/68  05/11/22 : 100/60  Taking Benicar 5 mg Daily.     Followed by:  Allergist-Immunologist Dr. Maria Barnett for CVID- She is getting Gammaplex infusions for this.     Mammogram-scheduled for March  PAP- sees GYN  Colonoscopy- scheduled for March    She has no complaints or concerns today.    Patient educated on plan of care, verbalized understanding.       Review of Systems   Constitutional:  Positive for fatigue. Negative for activity change, appetite change, chills, diaphoresis and fever.   HENT:  Negative for congestion, ear pain, postnasal drip, sinus pressure, sneezing and sore throat.    Eyes:  Negative for pain, discharge, redness and itching.   Respiratory:  Negative for apnea, cough, chest tightness, shortness of breath and wheezing.    Cardiovascular:  Negative for chest pain and leg swelling.   Gastrointestinal:  Negative for abdominal distention, abdominal pain, constipation, diarrhea, nausea and vomiting.   Genitourinary:  Negative for difficulty urinating, dysuria, flank pain and frequency.   Skin:  Negative for color change, rash and wound.   Neurological:  Negative for dizziness.   All other systems reviewed and are negative.    Patient Active Problem List   Diagnosis    Obesity (BMI 30-39.9)    Immune deficiency disorder    Borderline  abnormal TFTs    Benign essential HTN    Thyroid nodule    Urethral diverticulum    Vaginal lesion    Severe persistent asthma without complication    Lung nodules    CVID (common variable immunodeficiency)       Objective:      Physical Exam  Vitals and nursing note reviewed.   Constitutional:       General: She is not in acute distress.     Appearance: Normal appearance. She is well-developed.   HENT:      Head: Normocephalic.      Nose: Nose normal.   Eyes:      Conjunctiva/sclera: Conjunctivae normal.      Pupils: Pupils are equal, round, and reactive to light.   Cardiovascular:      Rate and Rhythm: Normal rate and regular rhythm.      Heart sounds: Normal heart sounds.   Pulmonary:      Effort: Pulmonary effort is normal. No respiratory distress.      Breath sounds: Normal breath sounds.   Musculoskeletal:      Cervical back: Normal range of motion and neck supple.   Skin:     General: Skin is warm and dry.      Findings: No rash.   Neurological:      Mental Status: She is alert and oriented to person, place, and time.   Psychiatric:         Behavior: Behavior normal.       Lab Results   Component Value Date    WBC 6.77 12/09/2022    HGB 13.9 12/09/2022    HCT 42.8 12/09/2022     12/09/2022    CHOL 208 (H) 11/02/2022    TRIG 73 11/02/2022    HDL 64 11/02/2022    ALT 41 (H) 12/09/2022    AST 41 (H) 12/09/2022     12/09/2022    K 4.1 12/09/2022     12/09/2022    CREATININE 0.61 12/09/2022    BUN 15 12/09/2022    CO2 32 (H) 12/09/2022    TSH 0.799 11/02/2022    HGBA1C 5.2 11/02/2022     The 10-year ASCVD risk score (Patti SANTANA, et al., 2019) is: 1.7%    Values used to calculate the score:      Age: 52 years      Sex: Female      Is Non- : No      Diabetic: No      Tobacco smoker: No      Systolic Blood Pressure: 132 mmHg      Is BP treated: Yes      HDL Cholesterol: 64 mg/dL      Total Cholesterol: 208 mg/dL  Visit Vitals  /70 (BP Location: Left arm, Patient  "Position: Sitting, BP Method: Medium (Manual))   Pulse 76   Temp 96.9 °F (36.1 °C)   Ht 5' 4" (1.626 m)   Wt 84.4 kg (186 lb 1.1 oz)   SpO2 97%   BMI 31.94 kg/m²      Assessment:       1. Benign essential HTN    2. Immune deficiency disorder    3. CVID (common variable immunodeficiency)    4. Severe persistent asthma without complication    5. Anxiety        Plan:       1. Benign essential HTN  -     olmesartan (BENICAR) 5 MG Tab; Take 1 tablet (5 mg total) by mouth once daily.  Dispense: 90 tablet; Refill: 1   - Stable-Continue Benicar   - Continue current plan of care    2. Immune deficiency disorder/CVID (common variable immunodeficiency)     - Stable-Continue    - Continue current plan of care   - Follow up with Dr. Barnett    3. Severe persistent asthma without complication   - Stable   - Continue current plan of care    4. Anxiety  -     hydrOXYzine HCL (ATARAX) 25 MG tablet; Take 1 tablet (25 mg total) by mouth 3 (three) times daily.  Dispense: 30 tablet; Refill: 3       Follow up in about 3 months (around 5/14/2023), or if symptoms worsen or fail to improve.      Future Appointments       Date Provider Specialty Appt Notes    5/15/2023 Amrita Gutierrez NP Family Medicine f/u              "

## 2023-02-14 NOTE — PATIENT INSTRUCTIONS

## 2023-03-08 ENCOUNTER — HOSPITAL ENCOUNTER (OUTPATIENT)
Dept: RADIOLOGY | Facility: HOSPITAL | Age: 53
Discharge: HOME | End: 2023-03-08
Attending: SPECIALIST
Payer: COMMERCIAL

## 2023-03-08 ENCOUNTER — TELEPHONE (OUTPATIENT)
Dept: GASTROENTEROLOGY | Facility: CLINIC | Age: 53
End: 2023-03-08
Payer: COMMERCIAL

## 2023-03-08 DIAGNOSIS — Z12.31 ENCOUNTER FOR SCREENING MAMMOGRAM FOR MALIGNANT NEOPLASM OF BREAST: ICD-10-CM

## 2023-03-08 PROCEDURE — 77067 SCR MAMMO BI INCL CAD: CPT | Mod: TC,PO

## 2023-03-08 NOTE — TELEPHONE ENCOUNTER
Patient to call back with prep she has at home. Emailed miralax prep to patient as she did a pill prep and was not cleaned out for her last c-scope.

## 2023-03-08 NOTE — TELEPHONE ENCOUNTER
----- Message from Quin Smallwood sent at 3/8/2023 10:31 AM CST -----  Contact: self  Type: Needs Medical Advice  Who Called: patient   Best Call Back Number: [phone]  Additional Information: Patient had one in 2021 but wasn't fully clean and now wants to schedule one with [facility]. Plz call pt to get scheduled. Pt states leave voicemail if necessary. Pt prefers [facility] and [facility] if [facility] is not available to have it done. Thanks

## 2023-04-19 ENCOUNTER — TELEPHONE (OUTPATIENT)
Dept: OPHTHALMOLOGY | Facility: CLINIC | Age: 53
End: 2023-04-19
Payer: COMMERCIAL

## 2023-04-19 ENCOUNTER — OFFICE VISIT (OUTPATIENT)
Dept: OPTOMETRY | Facility: CLINIC | Age: 53
End: 2023-04-19
Payer: COMMERCIAL

## 2023-04-19 DIAGNOSIS — H04.123 DRY EYE SYNDROME OF BILATERAL LACRIMAL GLANDS: ICD-10-CM

## 2023-04-19 DIAGNOSIS — H25.13 AGE-RELATED NUCLEAR CATARACT, BILATERAL: ICD-10-CM

## 2023-04-19 DIAGNOSIS — H25.042 POSTERIOR SUBCAPSULAR AGE-RELATED CATARACT OF LEFT EYE: ICD-10-CM

## 2023-04-19 DIAGNOSIS — H52.4 MYOPIA WITH ASTIGMATISM AND PRESBYOPIA, BILATERAL: Primary | ICD-10-CM

## 2023-04-19 DIAGNOSIS — H35.371 EPIRETINAL MEMBRANE (ERM) OF RIGHT EYE: ICD-10-CM

## 2023-04-19 DIAGNOSIS — H52.13 MYOPIA WITH ASTIGMATISM AND PRESBYOPIA, BILATERAL: Primary | ICD-10-CM

## 2023-04-19 DIAGNOSIS — H52.203 MYOPIA WITH ASTIGMATISM AND PRESBYOPIA, BILATERAL: Primary | ICD-10-CM

## 2023-04-19 PROCEDURE — 92004 COMPRE OPH EXAM NEW PT 1/>: CPT | Mod: S$GLB,,,

## 2023-04-19 PROCEDURE — 99999 PR PBB SHADOW E&M-EST. PATIENT-LVL III: ICD-10-PCS | Mod: PBBFAC,,,

## 2023-04-19 PROCEDURE — 92004 PR EYE EXAM, NEW PATIENT,COMPREHESV: ICD-10-PCS | Mod: S$GLB,,,

## 2023-04-19 PROCEDURE — 92134 CPTRZ OPH DX IMG PST SGM RTA: CPT | Mod: S$GLB,,,

## 2023-04-19 PROCEDURE — 99999 PR PBB SHADOW E&M-EST. PATIENT-LVL III: CPT | Mod: PBBFAC,,,

## 2023-04-19 PROCEDURE — 92134 PR COMPUTERIZED OPHTHALMIC IMAGING RETINA: ICD-10-PCS | Mod: S$GLB,,,

## 2023-04-19 NOTE — PROGRESS NOTES
HPI    New pt referred for decrease VA OS. Last exam- 03/25/2023 w/ [facility].       Pwt went in to [facility] in [place] and was told she has   decreased VA OS and was referred in for DFE. Pt did get new glasses at   visit. Pt sts VA OS has been feeling blurry x a few months. Pt denies   flashes/floaters/pain. Pt using Systane Ultra OU QD.   Last edited by Issa Recinos, OD on 4/19/2023 12:06 PM.            Assessment /Plan     For exam results, see Encounter Report.    Posterior subcapsular age-related cataract of left eye  -     Ambulatory referral/consult to Ophthalmology; Future; Expected date: 04/26/2023    Age-related nuclear cataract, bilateral    Epiretinal membrane (ERM) of right eye  -     Posterior Segment OCT Retina-Both eyes    Dry eye syndrome of bilateral lacrimal glands    Myopia with astigmatism and presbyopia, bilateral      Pt noticing decreased VA OS for the last few months. Was seen by an OD in [place] and referred in for DFE, OCT, and further evaluation to explain reduction in acuity OS. BCVA on previous examination and examination today were both 20/50. Dilated exam revealed a 1+ PSC cataract OS. Pt states that she has an extensive history of oral and nasal steroid use as it is one of the only things that makes her feel better when she is symptomatic. Ed pt that PSC likely secondary to steroids and referred pt to [facility] [facility] for further evaluation of cataract and potential removal.   Mild NS OU, not visually significant OD. Ed pt on age-related nature of cataracts and on symptoms of worsening cataracts including reduced BCVA and glare. Monitor OD for now, OS sent for cataract eval.  Moderate ERM OD, BCVA 20/20. Mac OCT obtained today showing normal foveal contour OS. Monitor yearly for changes.  Longstanding history of Dry Eye. Minimal signs upon examination today. Pt uses Systane Ultra 1x/day and gets relief. Ed pt that ok to increase to BID-QID if needed. Monitor  yearly, sooner if pt becomes more symptomatic.  Pt just got new specs ~1 month ago. Refraction OS only yielded no improvement in acuity. Spec rx deferred secondary to cataract eval.    RTC: for cataract eval, optometry following for refraction/annual exam needs

## 2023-04-19 NOTE — TELEPHONE ENCOUNTER
Spoke to pt and scheduled appt     ----- Message from Alicia Kitchen sent at 4/19/2023  2:06 PM CDT -----  Contact: self  Type:  Patient Returning Call    Who Called:  patient   Who Left Message for Patient:  Nicol   Does the patient know what this is regarding?:  to On license of [facility]   Best Call Back Number:  [phone]  Additional Information:  Thanks

## 2023-04-19 NOTE — TELEPHONE ENCOUNTER
Lvm for pt to call back to schedule appt     ----- Message from Meri Chery sent at 4/19/2023 12:42 PM CDT -----  PSC OS. Pt would like to come to [place] for sx, lives in [place]. Please contact.

## 2023-05-05 ENCOUNTER — PATIENT MESSAGE (OUTPATIENT)
Dept: FAMILY MEDICINE | Facility: CLINIC | Age: 53
End: 2023-05-05
Payer: COMMERCIAL

## 2023-05-05 ENCOUNTER — TELEPHONE (OUTPATIENT)
Dept: FAMILY MEDICINE | Facility: CLINIC | Age: 53
End: 2023-05-05
Payer: COMMERCIAL

## 2023-05-05 DIAGNOSIS — B00.1 FEVER BLISTER: Primary | ICD-10-CM

## 2023-05-05 RX ORDER — VALACYCLOVIR HYDROCHLORIDE 1 G/1
2000 TABLET, FILM COATED ORAL EVERY 12 HOURS
Qty: 4 TABLET | Refills: 2 | Status: SHIPPED | OUTPATIENT
Start: 2023-05-05

## 2023-05-05 NOTE — TELEPHONE ENCOUNTER
Attempted to contact patient to advise of RX sent in, no answer. LVM advising RX sent in to pharmacy.

## 2023-05-05 NOTE — TELEPHONE ENCOUNTER
See patient request below, has a fever blister, requesting RX for treatment.   Please review and send if appropriate.   LOV: 2/14/23  NOV: 5/24/23

## 2023-05-05 NOTE — TELEPHONE ENCOUNTER
----- Message from Mer Silverman sent at 5/5/2023  2:36 PM CDT -----  Contact: self  Type:  RX Refill Request    Who Called: Pt  Refill or New Rx:Refill  RX Name and Strength:valacyclovir 1 mg  How is the patient currently taking it? (ex. 1XDay):as needed  Is this a 30 day or 90 day RX:n/a  Preferred Pharmacy with phone number:  [facility], [place] - [address]  [address]  [place] [address]  Phone: [phone] Fax: [phone]    Local or Mail Order:Local  Ordering Provider:Amrita Gutierrez NP  Would the patient rather a call back or a response via [facility]? 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    Thank you...

## 2023-05-24 ENCOUNTER — OFFICE VISIT (OUTPATIENT)
Dept: FAMILY MEDICINE | Facility: CLINIC | Age: 53
End: 2023-05-24
Payer: COMMERCIAL

## 2023-05-24 VITALS
OXYGEN SATURATION: 96 % | BODY MASS INDEX: 32.93 KG/M2 | TEMPERATURE: 98 F | HEIGHT: 64 IN | DIASTOLIC BLOOD PRESSURE: 70 MMHG | SYSTOLIC BLOOD PRESSURE: 130 MMHG | WEIGHT: 192.88 LBS | HEART RATE: 66 BPM

## 2023-05-24 DIAGNOSIS — B37.2 YEAST DERMATITIS: ICD-10-CM

## 2023-05-24 DIAGNOSIS — D83.9 CVID (COMMON VARIABLE IMMUNODEFICIENCY): ICD-10-CM

## 2023-05-24 DIAGNOSIS — E66.9 OBESITY (BMI 30-39.9): ICD-10-CM

## 2023-05-24 DIAGNOSIS — I10 BENIGN ESSENTIAL HTN: Primary | ICD-10-CM

## 2023-05-24 DIAGNOSIS — J45.50 SEVERE PERSISTENT ASTHMA WITHOUT COMPLICATION: ICD-10-CM

## 2023-05-24 DIAGNOSIS — D84.9 IMMUNE DEFICIENCY DISORDER: ICD-10-CM

## 2023-05-24 PROCEDURE — 3078F DIAST BP <80 MM HG: CPT | Mod: ,,,

## 2023-05-24 PROCEDURE — 4010F PR ACE/ARB THEARPY RXD/TAKEN: ICD-10-PCS | Mod: ,,,

## 2023-05-24 PROCEDURE — 4010F ACE/ARB THERAPY RXD/TAKEN: CPT | Mod: ,,,

## 2023-05-24 PROCEDURE — 3008F BODY MASS INDEX DOCD: CPT | Mod: ,,,

## 2023-05-24 PROCEDURE — 3075F PR MOST RECENT SYSTOLIC BLOOD PRESS GE 130-139MM HG: ICD-10-PCS | Mod: ,,,

## 2023-05-24 PROCEDURE — 1159F PR MEDICATION LIST DOCUMENTED IN MEDICAL RECORD: ICD-10-PCS | Mod: ,,,

## 2023-05-24 PROCEDURE — 1160F PR REVIEW ALL MEDS BY PRESCRIBER/CLIN PHARMACIST DOCUMENTED: ICD-10-PCS | Mod: ,,,

## 2023-05-24 PROCEDURE — 3078F PR MOST RECENT DIASTOLIC BLOOD PRESSURE < 80 MM HG: ICD-10-PCS | Mod: ,,,

## 2023-05-24 PROCEDURE — 3075F SYST BP GE 130 - 139MM HG: CPT | Mod: ,,,

## 2023-05-24 PROCEDURE — 99214 PR OFFICE/OUTPT VISIT, EST, LEVL IV, 30-39 MIN: ICD-10-PCS | Mod: ,,,

## 2023-05-24 PROCEDURE — 3008F PR BODY MASS INDEX (BMI) DOCUMENTED: ICD-10-PCS | Mod: ,,,

## 2023-05-24 PROCEDURE — 99214 OFFICE O/P EST MOD 30 MIN: CPT | Mod: ,,,

## 2023-05-24 PROCEDURE — 1159F MED LIST DOCD IN RCRD: CPT | Mod: ,,,

## 2023-05-24 PROCEDURE — 1160F RVW MEDS BY RX/DR IN RCRD: CPT | Mod: ,,,

## 2023-05-24 RX ORDER — NYSTATIN 100000 [USP'U]/G
POWDER TOPICAL 2 TIMES DAILY
Qty: 15 G | Refills: 0 | Status: SHIPPED | OUTPATIENT
Start: 2023-05-24

## 2023-05-24 RX ORDER — OLMESARTAN MEDOXOMIL 5 MG/1
5 TABLET ORAL DAILY
Qty: 90 TABLET | Refills: 1 | Status: SHIPPED | OUTPATIENT
Start: 2023-05-24 | End: 2024-01-05 | Stop reason: SDUPTHER

## 2023-05-24 NOTE — PROGRESS NOTES
Subjective:       Patient ID: Sondra Beltran is a 53 y.o. female.    Chief Complaint: Follow-up and Hypertension (Taking her medication as directed w/o any problems or concerns )    Patient presents to the clinic for a 3 month follow up.     Patient Active Problem List:     Obesity (BMI 30-39.9)     Immune deficiency disorder     Borderline abnormal TFTs     Benign essential HTN     Thyroid nodule     Urethral diverticulum     Vaginal lesion     Severe persistent asthma without complication     Lung nodules     CVID (common variable immunodeficiency)    Hypertension-  BP Readings from Last 3 Encounters:  05/24/23 : 130/70  02/14/23 : 132/70  11/02/22 : 132/68  Taking Benicar 5 mg Daily.      Followed by:  Allergist-Immunologist Dr. Maria Barnett for CVID- She was getting Gammaplex infusions for this- having issues with insurance- almost been a year since her last infusion.      Mammogram-3/2023- repeat in 1 year  PAP- sees GYN  Colonoscopy- scheduled for Monday     Reports occasional red irritated rash under her abdominal fold. Especially when it is hot outside.     She is overall doing well. Has no other complaints or concerns at this time.   Patient educated on plan of care, verbalized understanding.      Review of Systems   Constitutional:  Positive for fatigue. Negative for activity change, appetite change, chills, diaphoresis and fever.   HENT:  Negative for congestion, ear pain, postnasal drip, sinus pressure, sneezing and sore throat.    Eyes:  Negative for pain, discharge, redness and itching.   Respiratory:  Negative for apnea, cough, chest tightness, shortness of breath and wheezing.    Cardiovascular:  Negative for chest pain and leg swelling.   Gastrointestinal:  Negative for abdominal distention, abdominal pain, constipation, diarrhea, nausea and vomiting.   Genitourinary:  Negative for difficulty urinating, dysuria, flank pain and frequency.   Skin:  Negative for color change, rash and wound.    Neurological:  Negative for dizziness.   All other systems reviewed and are negative.    Patient Active Problem List   Diagnosis    Obesity (BMI 30-39.9)    Immune deficiency disorder    Borderline abnormal TFTs    Benign essential HTN    Thyroid nodule    Urethral diverticulum    Vaginal lesion    Severe persistent asthma without complication    Lung nodules    CVID (common variable immunodeficiency)       Objective:      Physical Exam  Vitals and nursing note reviewed.   Constitutional:       General: She is not in acute distress.     Appearance: Normal appearance. She is well-developed.   HENT:      Head: Normocephalic.      Nose: Nose normal.   Eyes:      Conjunctiva/sclera: Conjunctivae normal.      Pupils: Pupils are equal, round, and reactive to light.   Cardiovascular:      Rate and Rhythm: Normal rate and regular rhythm.      Heart sounds: Normal heart sounds.   Pulmonary:      Effort: Pulmonary effort is normal. No respiratory distress.      Breath sounds: Normal breath sounds.   Musculoskeletal:      Cervical back: Normal range of motion and neck supple.   Skin:     General: Skin is warm and dry.      Findings: No rash.   Neurological:      Mental Status: She is alert and oriented to person, place, and time.   Psychiatric:         Behavior: Behavior normal.       Lab Results   Component Value Date    WBC 6.77 12/09/2022    HGB 13.9 12/09/2022    HCT 42.8 12/09/2022     12/09/2022    CHOL 208 (H) 11/02/2022    TRIG 73 11/02/2022    HDL 64 11/02/2022    ALT 41 (H) 12/09/2022    AST 41 (H) 12/09/2022     12/09/2022    K 4.1 12/09/2022     12/09/2022    CREATININE 0.61 12/09/2022    BUN 15 12/09/2022    CO2 32 (H) 12/09/2022    TSH 0.799 11/02/2022    HGBA1C 5.2 11/02/2022     The 10-year ASCVD risk score (Patti SANTANA, et al., 2019) is: 1.8%    Values used to calculate the score:      Age: 53 years      Sex: Female      Is Non- : No      Diabetic: No      Tobacco  "smoker: No      Systolic Blood Pressure: 130 mmHg      Is BP treated: Yes      HDL Cholesterol: 64 mg/dL      Total Cholesterol: 208 mg/dL  Visit Vitals  /70 (BP Location: Left arm, Patient Position: Sitting, BP Method: Large (Manual))   Pulse 66   Temp 97.7 °F (36.5 °C) (Temporal)   Ht 5' 4" (1.626 m)   Wt 87.5 kg (192 lb 14.4 oz)   SpO2 96%   BMI 33.11 kg/m²      Assessment:       1. Benign essential HTN    2. Yeast dermatitis    3. Severe persistent asthma without complication    4. CVID (common variable immunodeficiency)    5. Immune deficiency disorder    6. Obesity (BMI 30-39.9)        Plan:       1. Benign essential HTN  -     olmesartan (BENICAR) 5 MG Tab; Take 1 tablet (5 mg total) by mouth once daily.  Dispense: 90 tablet; Refill: 1   - Stable-Continue Benicar   - Continue current plan of care    2. Yeast dermatitis  -     nystatin (MYCOSTATIN) powder; Apply topically 2 (two) times daily.  Dispense: 15 g; Refill: 0    3. Severe persistent asthma without complication   - Stable-Continue Symbicort and Albuterol PRN   - Continue current plan of care    4. CVID (common variable immunodeficiency)/Immune deficiency disorder   - Stable- Hopefully insurance will approve infusions   - Continue current plan of care   - Follow up with Allergist-Immunologist Dr. Maria Barnett     5. Obesity (BMI 30-39.9)   - Low fat, low cholesterol diet and exercise as tolerated.      Follow up in about 6 months (around 11/24/2023), or if symptoms worsen or fail to improve.      Future Appointments       Date Provider Specialty Appt Notes    7/13/2023 Suhail Espinal MD Ophthalmology cataract eval per Dr Recinos     11/30/2023 Amrita Gutierrez NP Family Medicine 6 month follow up             "

## 2023-05-28 ENCOUNTER — ANESTHESIA EVENT (OUTPATIENT)
Dept: ENDOSCOPY | Facility: HOSPITAL | Age: 53
End: 2023-05-28
Payer: COMMERCIAL

## 2023-05-28 NOTE — ANESTHESIA PREPROCEDURE EVALUATION
05/28/2023  Sondra Beltran is a 53 y.o., female.    Pre-op Assessment    I have reviewed the Patient Summary Reports.    I have reviewed the Nursing Notes. I have reviewed the NPO Status.   I have reviewed the Medications.     Review of Systems  Anesthesia Hx:  No problems with previous Anesthesia    Social:  Non-Smoker    Hematology/Oncology:  Hematology Normal   Oncology Normal     EENT/Dental:EENT/Dental Normal   Cardiovascular:   Hypertension    Pulmonary:   Asthma    Renal/:  Renal/ Normal     Hepatic/GI:  Hepatic/GI Normal    Musculoskeletal:  Musculoskeletal Normal    Neurological:   Neuromuscular Disease,    Endocrine:  Endocrine Normal    Dermatological:  Skin Normal    Psych:  Psychiatric Normal           Physical Exam  General:  Obesity      Airway/Jaw/Neck:  Airway Findings: Mouth Opening: Normal   Tongue: Normal   General Airway Assessment: Adult Mallampati: II  Improves to I with phonation.        Eyes/Ears/Nose:  EYES/EARS/NOSE FINDINGS: Normal   Dental:  DENTAL FINDINGS: Normal   Chest/Lungs:  Chest/Lungs Findings: Clear to auscultation, Normal Respiratory Rate      Heart/Vascular:  Heart Findings: Rate: Normal  Rhythm: Regular Rhythm        Mental Status:  Mental Status Findings:  Cooperative, Alert and Oriented         Anesthesia Plan  Type of Anesthesia, risks & benefits discussed:  Anesthesia Type:  general    Patient's Preference:   Plan Factors:          Intra-op Monitoring Plan: standard ASA monitors  Intra-op Monitoring Plan Comments:   Post Op Pain Control Plan: IV/PO Opioids PRN  Post Op Pain Control Plan Comments:     Induction:   IV  Beta Blocker:  Patient is not currently on a Beta-Blocker (No further documentation required).       Informed Consent: Informed consent signed with the Patient and all parties understand the risks and agree with anesthesia plan.  All  questions answered.  Anesthesia consent signed with patient.  ASA Score: 2     Day of Surgery Review of History & Physical: I have interviewed and examined the patient. I have reviewed the patient's H&P dated:  There are no significant changes.            Ready For Surgery From Anesthesia Perspective.           Physical Exam  General: Obesity    Airway:  Mallampati: II / I  Mouth Opening: Normal  Tongue: Normal    Chest/Lungs:  Clear to auscultation, Normal Respiratory Rate    Heart:  Rate: Normal  Rhythm: Regular Rhythm          Anesthesia Plan  Type of Anesthesia, risks & benefits discussed:    Anesthesia Type: general  Intra-op Monitoring Plan: standard ASA monitors  Post Op Pain Control Plan: IV/PO Opioids PRN  Induction:  IV  Informed Consent: Informed consent signed with the Patient and all parties understand the risks and agree with anesthesia plan.  All questions answered.   ASA Score: 2  Day of Surgery Review of History & Physical: I have interviewed and examined the patient. I have reviewed the patient's H&P dated:     Ready For Surgery From Anesthesia Perspective.       .

## 2023-05-29 ENCOUNTER — ANESTHESIA (OUTPATIENT)
Dept: ENDOSCOPY | Facility: HOSPITAL | Age: 53
End: 2023-05-29
Payer: COMMERCIAL

## 2023-05-29 ENCOUNTER — HOSPITAL ENCOUNTER (OUTPATIENT)
Facility: HOSPITAL | Age: 53
Discharge: HOME | End: 2023-05-29
Attending: INTERNAL MEDICINE | Admitting: INTERNAL MEDICINE
Payer: COMMERCIAL

## 2023-05-29 VITALS
HEIGHT: 64 IN | TEMPERATURE: 98 F | WEIGHT: 192 LBS | OXYGEN SATURATION: 98 % | DIASTOLIC BLOOD PRESSURE: 65 MMHG | RESPIRATION RATE: 20 BRPM | HEART RATE: 66 BPM | BODY MASS INDEX: 32.78 KG/M2 | SYSTOLIC BLOOD PRESSURE: 119 MMHG

## 2023-05-29 DIAGNOSIS — K57.90 DIVERTICULOSIS: ICD-10-CM

## 2023-05-29 DIAGNOSIS — Z12.11 COLON CANCER SCREENING: ICD-10-CM

## 2023-05-29 DIAGNOSIS — K64.8 INTERNAL HEMORRHOIDS: Primary | ICD-10-CM

## 2023-05-29 DIAGNOSIS — K63.5 POLYP OF COLON, UNSPECIFIED PART OF COLON, UNSPECIFIED TYPE: ICD-10-CM

## 2023-05-29 PROCEDURE — 63600175 PHARM REV CODE 636 W HCPCS: Performed by: NURSE ANESTHETIST, CERTIFIED REGISTERED

## 2023-05-29 PROCEDURE — 27201012 HC FORCEPS, HOT/COLD, DISP: Performed by: INTERNAL MEDICINE

## 2023-05-29 PROCEDURE — D9220A PRA ANESTHESIA: ICD-10-PCS | Mod: 33,CRNA,, | Performed by: NURSE ANESTHETIST, CERTIFIED REGISTERED

## 2023-05-29 PROCEDURE — 37000008 HC ANESTHESIA 1ST 15 MINUTES: Performed by: INTERNAL MEDICINE

## 2023-05-29 PROCEDURE — 45385 COLONOSCOPY W/LESION REMOVAL: CPT | Mod: 33,,, | Performed by: INTERNAL MEDICINE

## 2023-05-29 PROCEDURE — D9220A PRA ANESTHESIA: ICD-10-PCS | Mod: 33,ANES,, | Performed by: ANESTHESIOLOGY

## 2023-05-29 PROCEDURE — 37000009 HC ANESTHESIA EA ADD 15 MINS: Performed by: INTERNAL MEDICINE

## 2023-05-29 PROCEDURE — 45385 PR COLONOSCOPY,REMV LESN,SNARE: ICD-10-PCS | Mod: 33,,, | Performed by: INTERNAL MEDICINE

## 2023-05-29 PROCEDURE — 88305 TISSUE EXAM BY PATHOLOGIST: CPT | Mod: 26,,, | Performed by: PATHOLOGY

## 2023-05-29 PROCEDURE — D9220A PRA ANESTHESIA: Mod: 33,CRNA,, | Performed by: NURSE ANESTHETIST, CERTIFIED REGISTERED

## 2023-05-29 PROCEDURE — D9220A PRA ANESTHESIA: Mod: 33,ANES,, | Performed by: ANESTHESIOLOGY

## 2023-05-29 PROCEDURE — 25000003 PHARM REV CODE 250: Performed by: INTERNAL MEDICINE

## 2023-05-29 PROCEDURE — 27201089 HC SNARE, DISP (ANY): Performed by: INTERNAL MEDICINE

## 2023-05-29 PROCEDURE — 25000003 PHARM REV CODE 250: Performed by: NURSE ANESTHETIST, CERTIFIED REGISTERED

## 2023-05-29 PROCEDURE — 88305 TISSUE EXAM BY PATHOLOGIST: ICD-10-PCS | Mod: 26,,, | Performed by: PATHOLOGY

## 2023-05-29 PROCEDURE — 45385 COLONOSCOPY W/LESION REMOVAL: CPT | Mod: PT | Performed by: INTERNAL MEDICINE

## 2023-05-29 PROCEDURE — 88305 TISSUE EXAM BY PATHOLOGIST: CPT | Performed by: PATHOLOGY

## 2023-05-29 RX ORDER — LIDOCAINE HYDROCHLORIDE 20 MG/ML
INJECTION INTRAVENOUS
Status: DISCONTINUED | OUTPATIENT
Start: 2023-05-29 | End: 2023-05-29

## 2023-05-29 RX ORDER — SODIUM CHLORIDE 9 MG/ML
INJECTION, SOLUTION INTRAVENOUS CONTINUOUS
Status: DISCONTINUED | OUTPATIENT
Start: 2023-05-29 | End: 2023-05-29 | Stop reason: HOSPADM

## 2023-05-29 RX ORDER — PROPOFOL 10 MG/ML
VIAL (ML) INTRAVENOUS
Status: DISCONTINUED | OUTPATIENT
Start: 2023-05-29 | End: 2023-05-29

## 2023-05-29 RX ADMIN — PROPOFOL 50 MG: 10 INJECTION, EMULSION INTRAVENOUS at 08:05

## 2023-05-29 RX ADMIN — LIDOCAINE HYDROCHLORIDE 50 MG: 20 INJECTION, SOLUTION INTRAVENOUS at 08:05

## 2023-05-29 RX ADMIN — PROPOFOL 100 MG: 10 INJECTION, EMULSION INTRAVENOUS at 08:05

## 2023-05-29 RX ADMIN — SODIUM CHLORIDE: 9 INJECTION, SOLUTION INTRAVENOUS at 07:05

## 2023-05-29 NOTE — DISCHARGE INSTRUCTIONS
Vss, lea po fluids, denies pain, ambulates easily. IV removed, catheter intact. Discharge instructions provided and states understanding. States ready to go home.  Discharged from facility with family per wheelchair.

## 2023-05-29 NOTE — H&P
CC: Family history of colon cancer in multiple second degree relatives, poor prep on last attempt    53 year old female with above. States that symptoms are absent, no alleviating/exacerbating factors. Positive family history of colorectal CA. No personal history of polyps. No bleeding or weight loss.     ROS:  No headache, no fever/chills, no chest pain/SOB, no nausea/vomiting/diarrhea/constipation/GI bleeding/abdominal pain, no dysuria/hematuria.    VSSAF   Exam:   Alert and oriented x 3; no apparent distress   PERRLA, sclera anicteric  CV: Regular rate/rhythm, normal PMI   Lungs: Clear bilaterally with no wheeze/rales   Abdomen: Soft, NT/ND, normal bowel sounds   Ext: No cyanosis, clubbing     Impression:   As above    Plan:   Proceed with endoscopy. Further recs to follow.

## 2023-05-29 NOTE — ANESTHESIA POSTPROCEDURE EVALUATION
Anesthesia Post Evaluation    Patient: Sondra Beltran    Procedure(s) Performed: Procedure(s) (LRB):  COLONOSCOPY (N/A)    Final Anesthesia Type: general      Patient location during evaluation: PACU  Patient participation: Yes- Able to Participate  Level of consciousness: awake and alert and oriented  Post-procedure vital signs: reviewed and stable  Pain management: adequate  Airway patency: patent    PONV status at discharge: No PONV  Anesthetic complications: no      Cardiovascular status: blood pressure returned to baseline  Respiratory status: unassisted, spontaneous ventilation and room air  Hydration status: euvolemic  Follow-up not needed.          Vitals Value Taken Time   /65 05/29/23 0855   Temp 36.6 °C (97.9 °F) 05/29/23 0855   Pulse 66 05/29/23 0855   Resp 20 05/29/23 0855   SpO2 98 % 05/29/23 0855         Event Time   Out of Recovery 09:16:06         Pain/Flores Score: Flores Score: 10 (5/29/2023  8:55 AM)

## 2023-05-29 NOTE — TRANSFER OF CARE
"Anesthesia Transfer of Care Note    Patient: Sondra Beltran    Procedure(s) Performed: Procedure(s) (LRB):  COLONOSCOPY (N/A)    Patient location: GI    Anesthesia Type: general    Transport from OR: Transported from OR on room air with adequate spontaneous ventilation    Post pain: adequate analgesia    Post assessment: no apparent anesthetic complications    Post vital signs: stable    Level of consciousness: awake    Nausea/Vomiting: no nausea/vomiting    Complications: none    Transfer of care protocol was followed      Last vitals:   Visit Vitals  /68 (BP Location: Left arm, Patient Position: Sitting)   Pulse 71   Temp 36.6 °C (97.9 °F) (Skin)   Resp 16   Ht 5' 4" (1.626 m)   Wt 87.1 kg (192 lb)   SpO2 99%   Breastfeeding No   BMI 32.96 kg/m²     "

## 2023-05-29 NOTE — PROVATION PATIENT INSTRUCTIONS
Discharge Summary/Instructions after an Endoscopic Procedure  Patient Name: Sondra Beltran  Patient MRN: [id number]  Patient YOB: 1970  Monday, May 29, 2023  Yimi Carrera MD  Dear patient,  As a result of recent federal legislation (The Federal Cures Act), you may   receive lab or pathology results from your procedure in your [facility]   account before your physician is able to contact you. Your physician or   their representative will relay the results to you with their   recommendations at their soonest availability.  Thank you,  RESTRICTIONS:  During your procedure today, you received medications for sedation.  These   medications may affect your judgment, balance and coordination.  Therefore,   for 24 hours, you have the following restrictions:   - DO NOT drive a car, operate machinery, make legal/financial decisions,   sign important papers or drink alcohol.    ACTIVITY:  Today: no heavy lifting, straining or running due to procedural   sedation/anesthesia.  The following day: return to full activity including work.  DIET:  Eat and drink normally unless instructed otherwise.     TREATMENT FOR COMMON SIDE EFFECTS:  - Mild abdominal pain, nausea, belching, bloating or excessive gas:  rest,   eat lightly and use a heating pad.  - Sore Throat: treat with throat lozenges and/or gargle with warm salt   water.  - Because air was used during the procedure, expelling large amounts of air   from your rectum or belching is normal.  - If a bowel prep was taken, you may not have a bowel movement for 1-3 days.    This is normal.  SYMPTOMS TO WATCH FOR AND REPORT TO YOUR PHYSICIAN:  1. Abdominal pain or bloating, other than gas cramps.  2. Chest pain.  3. Back pain.  4. Signs of infection such as: chills or fever occurring within 24 hours   after the procedure.  5. Rectal bleeding, which would show as bright red, maroon, or black stools.   (A tablespoon of blood from the rectum is not serious, especially if    hemorrhoids are present.)  6. Vomiting.  7. Weakness or dizziness.  GO DIRECTLY TO THE NEAREST EMERGENCY ROOM IF YOU HAVE ANY OF THE FOLLOWING:      Difficulty breathing              Chills and/or fever over 101 F   Persistent vomiting and/or vomiting blood   Severe abdominal pain   Severe chest pain   Black, tarry stools   Bleeding- more than one tablespoon   Any other symptom or condition that you feel may need urgent attention  Your doctor recommends these additional instructions:  If any biopsies were taken, your doctors clinic will contact you in 1 to 2   weeks with any results.  - Patient has a contact number available for emergencies.  The signs and   symptoms of potential delayed complications were discussed with the   patient.  Return to normal activities tomorrow.  Written discharge   instructions were provided to the patient.   - High fiber diet.   - Continue present medications.   - Await pathology results.   - Repeat colonoscopy in 3 years for surveillance.   - Discharge patient to home (ambulatory).   - Return to GI office PRN.  For questions, problems or results please call your physician - Yimi Carrera MD at Work:  [phone].  [facility], EMERGENCY ROOM PHONE NUMBER: [phone]  IF A COMPLICATION OR EMERGENCY SITUATION ARISES AND YOU ARE UNABLE TO REACH   YOUR PHYSICIAN - GO DIRECTLY TO THE EMERGENCY ROOM.  Yimi Carrera MD  5/29/2023 8:37:34 AM  This report has been verified and signed electronically.  Dear patient,  As a result of recent federal legislation (The Federal Cures Act), you may   receive lab or pathology results from your procedure in your [facility]   account before your physician is able to contact you. Your physician or   their representative will relay the results to you with their   recommendations at their soonest availability.  Thank you,  PROVATION

## 2023-05-31 LAB
FINAL PATHOLOGIC DIAGNOSIS: NORMAL
GROSS: NORMAL
Lab: NORMAL

## 2023-07-13 ENCOUNTER — OFFICE VISIT (OUTPATIENT)
Dept: OPHTHALMOLOGY | Facility: CLINIC | Age: 53
End: 2023-07-13
Payer: COMMERCIAL

## 2023-07-13 DIAGNOSIS — H25.043 POSTERIOR SUBCAPSULAR POLAR AGE-RELATED CATARACT, BILATERAL: Primary | ICD-10-CM

## 2023-07-13 PROCEDURE — 4010F ACE/ARB THERAPY RXD/TAKEN: CPT | Mod: CPTII,S$GLB,, | Performed by: OPHTHALMOLOGY

## 2023-07-13 PROCEDURE — 99204 OFFICE O/P NEW MOD 45 MIN: CPT | Mod: S$GLB,,, | Performed by: OPHTHALMOLOGY

## 2023-07-13 PROCEDURE — 1159F PR MEDICATION LIST DOCUMENTED IN MEDICAL RECORD: ICD-10-PCS | Mod: CPTII,S$GLB,, | Performed by: OPHTHALMOLOGY

## 2023-07-13 PROCEDURE — 99204 PR OFFICE/OUTPT VISIT, NEW, LEVL IV, 45-59 MIN: ICD-10-PCS | Mod: S$GLB,,, | Performed by: OPHTHALMOLOGY

## 2023-07-13 PROCEDURE — 1159F MED LIST DOCD IN RCRD: CPT | Mod: CPTII,S$GLB,, | Performed by: OPHTHALMOLOGY

## 2023-07-13 PROCEDURE — 99999 PR PBB SHADOW E&M-EST. PATIENT-LVL III: ICD-10-PCS | Mod: PBBFAC,,, | Performed by: OPHTHALMOLOGY

## 2023-07-13 PROCEDURE — 1160F PR REVIEW ALL MEDS BY PRESCRIBER/CLIN PHARMACIST DOCUMENTED: ICD-10-PCS | Mod: CPTII,S$GLB,, | Performed by: OPHTHALMOLOGY

## 2023-07-13 PROCEDURE — 4010F PR ACE/ARB THEARPY RXD/TAKEN: ICD-10-PCS | Mod: CPTII,S$GLB,, | Performed by: OPHTHALMOLOGY

## 2023-07-13 PROCEDURE — 99999 PR PBB SHADOW E&M-EST. PATIENT-LVL III: CPT | Mod: PBBFAC,,, | Performed by: OPHTHALMOLOGY

## 2023-07-13 PROCEDURE — 1160F RVW MEDS BY RX/DR IN RCRD: CPT | Mod: CPTII,S$GLB,, | Performed by: OPHTHALMOLOGY

## 2023-07-13 NOTE — PROGRESS NOTES
HPI    New pt CE.  vision is blurry. Was told prior to seeing Dr Recinos   that she had cataracts. States has gotten worse. OS worse than OD.     Pt  has been using steroids for several years.    Last edited by Mirna Whiteside on 7/13/2023  1:21 PM.            Assessment /Plan     For exam results, see Encounter Report.    Posterior subcapsular polar age-related cataract, bilateral  -     Ambulatory referral/consult to Ophthalmology      OS>OD  Patient with visually significant cataract impacting abiliity ADLs (reading, driving, PM driving, glare).  Discussed options, R & B, expectations, patient voices good understanding and wishes to proceed with procedure. Patient will likely benefit from sx.    Schedule CEIOL OS then OD  Monofocal dist IOL OU    RTC preop

## 2023-10-05 ENCOUNTER — OFFICE VISIT (OUTPATIENT)
Dept: FAMILY MEDICINE | Facility: CLINIC | Age: 53
End: 2023-10-05
Payer: COMMERCIAL

## 2023-10-05 ENCOUNTER — HOSPITAL ENCOUNTER (OUTPATIENT)
Dept: RADIOLOGY | Facility: HOSPITAL | Age: 53
Discharge: HOME | End: 2023-10-05
Attending: NURSE PRACTITIONER | Admitting: FAMILY MEDICINE
Payer: COMMERCIAL

## 2023-10-05 VITALS
WEIGHT: 195.81 LBS | SYSTOLIC BLOOD PRESSURE: 130 MMHG | BODY MASS INDEX: 33.43 KG/M2 | DIASTOLIC BLOOD PRESSURE: 79 MMHG | OXYGEN SATURATION: 97 % | HEART RATE: 64 BPM | HEIGHT: 64 IN

## 2023-10-05 DIAGNOSIS — I10 BENIGN ESSENTIAL HTN: ICD-10-CM

## 2023-10-05 DIAGNOSIS — J45.50 SEVERE PERSISTENT ASTHMA WITHOUT COMPLICATION: ICD-10-CM

## 2023-10-05 DIAGNOSIS — Z01.818 PREOPERATIVE EVALUATION TO RULE OUT SURGICAL CONTRAINDICATION: Primary | ICD-10-CM

## 2023-10-05 DIAGNOSIS — Z01.818 PREOPERATIVE EVALUATION TO RULE OUT SURGICAL CONTRAINDICATION: ICD-10-CM

## 2023-10-05 PROCEDURE — 3075F SYST BP GE 130 - 139MM HG: CPT | Mod: S$GLB,,, | Performed by: NURSE PRACTITIONER

## 2023-10-05 PROCEDURE — 3075F PR MOST RECENT SYSTOLIC BLOOD PRESS GE 130-139MM HG: ICD-10-PCS | Mod: S$GLB,,, | Performed by: NURSE PRACTITIONER

## 2023-10-05 PROCEDURE — 99999 PR PBB SHADOW E&M-EST. PATIENT-LVL IV: ICD-10-PCS | Mod: PBBFAC,,, | Performed by: NURSE PRACTITIONER

## 2023-10-05 PROCEDURE — 99999 PR PBB SHADOW E&M-EST. PATIENT-LVL IV: CPT | Mod: PBBFAC,,, | Performed by: NURSE PRACTITIONER

## 2023-10-05 PROCEDURE — 3078F PR MOST RECENT DIASTOLIC BLOOD PRESSURE < 80 MM HG: ICD-10-PCS | Mod: S$GLB,,, | Performed by: NURSE PRACTITIONER

## 2023-10-05 PROCEDURE — 3008F BODY MASS INDEX DOCD: CPT | Mod: S$GLB,,, | Performed by: NURSE PRACTITIONER

## 2023-10-05 PROCEDURE — 4010F ACE/ARB THERAPY RXD/TAKEN: CPT | Mod: S$GLB,,, | Performed by: NURSE PRACTITIONER

## 2023-10-05 PROCEDURE — 1159F MED LIST DOCD IN RCRD: CPT | Mod: S$GLB,,, | Performed by: NURSE PRACTITIONER

## 2023-10-05 PROCEDURE — 99214 PR OFFICE/OUTPT VISIT, EST, LEVL IV, 30-39 MIN: ICD-10-PCS | Mod: S$GLB,,, | Performed by: NURSE PRACTITIONER

## 2023-10-05 PROCEDURE — 3008F PR BODY MASS INDEX (BMI) DOCUMENTED: ICD-10-PCS | Mod: S$GLB,,, | Performed by: NURSE PRACTITIONER

## 2023-10-05 PROCEDURE — 1159F PR MEDICATION LIST DOCUMENTED IN MEDICAL RECORD: ICD-10-PCS | Mod: S$GLB,,, | Performed by: NURSE PRACTITIONER

## 2023-10-05 PROCEDURE — 3078F DIAST BP <80 MM HG: CPT | Mod: S$GLB,,, | Performed by: NURSE PRACTITIONER

## 2023-10-05 PROCEDURE — 99214 OFFICE O/P EST MOD 30 MIN: CPT | Mod: S$GLB,,, | Performed by: NURSE PRACTITIONER

## 2023-10-05 PROCEDURE — 4010F PR ACE/ARB THEARPY RXD/TAKEN: ICD-10-PCS | Mod: S$GLB,,, | Performed by: NURSE PRACTITIONER

## 2023-10-05 RX ORDER — MOMETASONE FUROATE 50 UG/1
SPRAY, METERED NASAL
COMMUNITY
End: 2024-01-05

## 2023-10-05 RX ORDER — HYDROCODONE POLISTIREX AND CHLORPHENIRAMINE POLISTIREX 10; 8 MG/5ML; MG/5ML
SUSPENSION, EXTENDED RELEASE ORAL
COMMUNITY
End: 2024-01-05

## 2023-10-05 RX ORDER — ALBUTEROL SULFATE 1.25 MG/3ML
SOLUTION RESPIRATORY (INHALATION)
COMMUNITY

## 2023-10-05 NOTE — PROGRESS NOTES
Subjective:       Patient ID: Sondra Beltran is a 53 y.o. female.    Chief Complaint: Pre-op Exam    Sondra Beltran presents to the [facility] today for surgery clearance for cataract surgery with Dr. Espinal at the end of the month   Patient Active Problem List  Diagnosis  · Obesity (BMI 30-39.9)  · Immune deficiency disorder  · Borderline abnormal TFTs  · Benign essential HTN  · Thyroid nodule  · Urethral diverticulum  · Vaginal lesion  · Severe persistent asthma without complication  · Lung nodules  · CVID (common variable immunodeficiency)    Dr. Espinal: 7/13/2023  Schedule CEIOL OS then OD    History of asthma. Denies any recent asthma flares or complications. On nebulizer's/inhalers.   Taking medications as prescribed    Hypertension:  Taking mediation as prescribed. Denies CP/tightness, palpitations, flutters, dizziness, tinnitus, edema       Review of Systems   Constitutional:  Negative for chills, fatigue and fever.   Eyes:  Positive for visual disturbance.   Respiratory:  Negative for chest tightness, shortness of breath and wheezing.    Cardiovascular:  Negative for chest pain, palpitations and leg swelling.   Gastrointestinal:  Negative for abdominal distention and abdominal pain.   Genitourinary:  Negative for difficulty urinating.   Allergic/Immunologic: Positive for environmental allergies.   Neurological:  Negative for dizziness, syncope, weakness and headaches.   Hematological:  Negative for adenopathy. Does not bruise/bleed easily.       Patient Active Problem List   Diagnosis    Obesity (BMI 30-39.9)    Immune deficiency disorder    Borderline abnormal TFTs    Benign essential HTN    Thyroid nodule    Urethral diverticulum    Vaginal lesion    Severe persistent asthma without complication    Lung nodules    CVID (common variable immunodeficiency)       Objective:      Physical Exam  Constitutional:       General: She is not in acute distress.     Appearance: Normal appearance.   Eyes:       "Conjunctiva/sclera: Conjunctivae normal.      Comments: Corrective lenses    Cardiovascular:      Rate and Rhythm: Normal rate and regular rhythm.      Heart sounds: Normal heart sounds. No murmur heard.  Pulmonary:      Effort: Pulmonary effort is normal. No respiratory distress.      Breath sounds: Normal breath sounds. No wheezing or rhonchi.   Abdominal:      General: Bowel sounds are normal.   Musculoskeletal:      Cervical back: Normal range of motion and neck supple.      Right lower leg: No edema.      Left lower leg: No edema.   Lymphadenopathy:      Cervical: No cervical adenopathy.   Neurological:      Mental Status: She is alert and oriented to person, place, and time.   Psychiatric:         Mood and Affect: Mood normal.         Behavior: Behavior normal.        Lab Results   Component Value Date    WBC 6.77 12/09/2022    HGB 13.9 12/09/2022    HCT 42.8 12/09/2022     12/09/2022    CHOL 208 (H) 11/02/2022    TRIG 73 11/02/2022    HDL 64 11/02/2022    ALT 41 (H) 12/09/2022    AST 41 (H) 12/09/2022     12/09/2022    K 4.1 12/09/2022     12/09/2022    CREATININE 0.61 12/09/2022    BUN 15 12/09/2022    CO2 32 (H) 12/09/2022    TSH 0.799 11/02/2022    HGBA1C 5.2 11/02/2022     The 10-year ASCVD risk score (Patti SANTANA, et al., 2019) is: 1.8%    Values used to calculate the score:      Age: 53 years      Sex: Female      Is Non- : No      Diabetic: No      Tobacco smoker: No      Systolic Blood Pressure: 130 mmHg      Is BP treated: Yes      HDL Cholesterol: 64 mg/dL      Total Cholesterol: 208 mg/dL  Visit Vitals  /79 (BP Location: Left arm, Patient Position: Sitting, BP Method: Medium (Manual))   Pulse 64   Ht 5' 4" (1.626 m)   Wt 88.8 kg (195 lb 12.8 oz)   SpO2 97%   BMI 33.61 kg/m²      Assessment:       1. Preoperative evaluation to rule out surgical contraindication    2. Severe persistent asthma without complication    3. Benign essential HTN        Plan: "       1. Preoperative evaluation to rule out surgical contraindication  -     IN OFFICE EKG 12-LEAD (to Muse)  -     X-Ray Chest PA And Lateral; Future; Expected date: 10/05/2023  -     Comprehensive Metabolic Panel; Future; Expected date: 10/05/2023  Jimenez Cardiac Risk: 3.9%, patient is low risk for complications.    2. Severe persistent asthma without complication  -     X-Ray Chest PA And Lateral; Future; Expected date: 10/05/2023  X ray reviewed- normal  3. Benign essential HTN  The patient was counseled on HTN education, management and recommendations. The need for weight reduction was reinforced and a BMI goal of 19 to 25 was set.  Patient was encouraged to adhere to a low sodium diet and a DASH diet was recommended. Patient was also encouraged to engage in routine exercise such as walking most days of the week greater than 30 minutes. Patient education materials were provided to the patient for home review and further reinforcement of topics discussed.         No follow-ups on file.      Future Appointments       Date Provider Specialty Appt Notes    10/5/2023  Radiology xray    10/19/2023 Suhail Espinal MD Ophthalmology 10/25 OS cat sx 11/15 OD    10/26/2023 Suhail Espinal MD Ophthalmology 1 day po OS     11/2/2023 Suhail Espinal MD Ophthalmology 1 week PO     11/16/2023 Suhail Espinal MD Ophthalmology 1 day PO OD     11/30/2023 Amrita Gutierrez NP Family Medicine 6 month follow up    12/14/2023 Suhail Espinal MD Ophthalmology 1m OU     4/19/2024 Viviane Vergara MD Dermatology skin check

## 2023-10-06 ENCOUNTER — TELEPHONE (OUTPATIENT)
Dept: OPHTHALMOLOGY | Facility: CLINIC | Age: 53
End: 2023-10-06
Payer: COMMERCIAL

## 2023-10-06 NOTE — TELEPHONE ENCOUNTER
Spoke to pt and made aware her pre op was scheduled with all of her other appointments back in July. Pt states she did not take off work for appt and cannot make it. Offered to schedule pt on 10/12 at 10:30. Pt states she cannot make that. Explained pre op would need to be done on 10/12 or 10/19 in order to have sx on 10/25. Pt states she would call back         -TD     ----- Message from Amaris Vaughn sent at 10/6/2023  3:11 PM CDT -----  Regarding: reschedule appt request  Contact: Sondra [phone]  Type: Needs Medical Advice  Who Called:  Sondra Baldwin Call Back Number: [phone]    Additional Information: Pt has appt with Dr. Espinal on 10/19, would like to reschedule to 10/20. Please call to advise.

## 2023-10-10 ENCOUNTER — HOSPITAL ENCOUNTER (OUTPATIENT)
Dept: RADIOLOGY | Facility: HOSPITAL | Age: 53
Discharge: HOME | End: 2023-10-10
Attending: NURSE PRACTITIONER
Payer: COMMERCIAL

## 2023-10-10 PROCEDURE — 71046 XR CHEST PA AND LATERAL: ICD-10-PCS | Mod: 26,,, | Performed by: RADIOLOGY

## 2023-10-10 PROCEDURE — 71046 X-RAY EXAM CHEST 2 VIEWS: CPT | Mod: 26,,, | Performed by: RADIOLOGY

## 2023-10-10 PROCEDURE — 71046 X-RAY EXAM CHEST 2 VIEWS: CPT | Mod: TC

## 2023-10-19 ENCOUNTER — OFFICE VISIT (OUTPATIENT)
Dept: OPHTHALMOLOGY | Facility: CLINIC | Age: 53
End: 2023-10-19
Payer: COMMERCIAL

## 2023-10-19 DIAGNOSIS — H25.043 POSTERIOR SUBCAPSULAR POLAR AGE-RELATED CATARACT, BILATERAL: Primary | ICD-10-CM

## 2023-10-19 PROCEDURE — 1159F MED LIST DOCD IN RCRD: CPT | Mod: CPTII,S$GLB,, | Performed by: OPHTHALMOLOGY

## 2023-10-19 PROCEDURE — 1160F RVW MEDS BY RX/DR IN RCRD: CPT | Mod: CPTII,S$GLB,, | Performed by: OPHTHALMOLOGY

## 2023-10-19 PROCEDURE — 1159F PR MEDICATION LIST DOCUMENTED IN MEDICAL RECORD: ICD-10-PCS | Mod: CPTII,S$GLB,, | Performed by: OPHTHALMOLOGY

## 2023-10-19 PROCEDURE — 1160F PR REVIEW ALL MEDS BY PRESCRIBER/CLIN PHARMACIST DOCUMENTED: ICD-10-PCS | Mod: CPTII,S$GLB,, | Performed by: OPHTHALMOLOGY

## 2023-10-19 PROCEDURE — 99999 PR PBB SHADOW E&M-EST. PATIENT-LVL III: ICD-10-PCS | Mod: PBBFAC,,, | Performed by: OPHTHALMOLOGY

## 2023-10-19 PROCEDURE — 92136 IOL MASTER - OS - LEFT EYE: ICD-10-PCS | Mod: LT,S$GLB,, | Performed by: OPHTHALMOLOGY

## 2023-10-19 PROCEDURE — 99214 PR OFFICE/OUTPT VISIT, EST, LEVL IV, 30-39 MIN: ICD-10-PCS | Mod: S$GLB,,, | Performed by: OPHTHALMOLOGY

## 2023-10-19 PROCEDURE — 92136 OPHTHALMIC BIOMETRY: CPT | Mod: LT,S$GLB,, | Performed by: OPHTHALMOLOGY

## 2023-10-19 PROCEDURE — 4010F PR ACE/ARB THEARPY RXD/TAKEN: ICD-10-PCS | Mod: CPTII,S$GLB,, | Performed by: OPHTHALMOLOGY

## 2023-10-19 PROCEDURE — 99999 PR PBB SHADOW E&M-EST. PATIENT-LVL III: CPT | Mod: PBBFAC,,, | Performed by: OPHTHALMOLOGY

## 2023-10-19 PROCEDURE — 4010F ACE/ARB THERAPY RXD/TAKEN: CPT | Mod: CPTII,S$GLB,, | Performed by: OPHTHALMOLOGY

## 2023-10-19 PROCEDURE — 99214 OFFICE O/P EST MOD 30 MIN: CPT | Mod: S$GLB,,, | Performed by: OPHTHALMOLOGY

## 2023-10-19 RX ORDER — MOXIFLOXACIN 5 MG/ML
1 SOLUTION/ DROPS OPHTHALMIC 4 TIMES DAILY
Qty: 3 ML | Refills: 1 | Status: SHIPPED | OUTPATIENT
Start: 2023-10-19 | End: 2023-11-02 | Stop reason: ALTCHOICE

## 2023-10-19 RX ORDER — TROPICAMIDE 10 MG/ML
1 SOLUTION/ DROPS OPHTHALMIC
Status: CANCELLED | OUTPATIENT
Start: 2023-10-19

## 2023-10-19 RX ORDER — SODIUM CHLORIDE 9 MG/ML
INJECTION, SOLUTION INTRAVENOUS CONTINUOUS
Status: CANCELLED | OUTPATIENT
Start: 2023-10-19

## 2023-10-19 RX ORDER — PREDNISOLONE ACETATE 10 MG/ML
1 SUSPENSION/ DROPS OPHTHALMIC 4 TIMES DAILY
Qty: 5 ML | Refills: 2 | Status: SHIPPED | OUTPATIENT
Start: 2023-10-19 | End: 2023-12-14 | Stop reason: ALTCHOICE

## 2023-10-19 RX ORDER — PROPARACAINE HYDROCHLORIDE 5 MG/ML
1 SOLUTION/ DROPS OPHTHALMIC
Status: CANCELLED | OUTPATIENT
Start: 2023-10-19

## 2023-10-19 RX ORDER — PHENYLEPHRINE HYDROCHLORIDE 25 MG/ML
1 SOLUTION/ DROPS OPHTHALMIC
Status: CANCELLED | OUTPATIENT
Start: 2023-10-19

## 2023-10-19 RX ORDER — KETOROLAC TROMETHAMINE 5 MG/ML
1 SOLUTION OPHTHALMIC 4 TIMES DAILY
Qty: 5 ML | Refills: 2 | Status: SHIPPED | OUTPATIENT
Start: 2023-10-19 | End: 2023-12-14 | Stop reason: ALTCHOICE

## 2023-10-19 NOTE — PROGRESS NOTES
HPI    Pt presents for pre op PCIOL OU     Complains of blurred vision as well as trouble driving at night due to   bright lights     Last edited by Nicol Garcia on 10/19/2023  2:11 PM.            Assessment /Plan     For exam results, see Encounter Report.    Posterior subcapsular polar age-related cataract, bilateral      Patient with visually significant cataract impacting abiliity ADLs (reading, driving, PM driving, glare).  Discussed options, R & B, expectations, patient voices good understanding and wishes to proceed with procedure. Patient will likely benefit from sx and signed consent.    Proceed with CEIOL OS  Monofocal dist IOL, pt declines toric

## 2023-10-24 ENCOUNTER — ANESTHESIA EVENT (OUTPATIENT)
Dept: SURGERY | Facility: HOSPITAL | Age: 53
End: 2023-10-24
Payer: COMMERCIAL

## 2023-10-24 RX ORDER — SODIUM CHLORIDE, SODIUM LACTATE, POTASSIUM CHLORIDE, CALCIUM CHLORIDE 600; 310; 30; 20 MG/100ML; MG/100ML; MG/100ML; MG/100ML
125 INJECTION, SOLUTION INTRAVENOUS CONTINUOUS
Status: CANCELLED | OUTPATIENT
Start: 2023-10-24

## 2023-10-24 RX ORDER — ONDANSETRON 2 MG/ML
4 INJECTION INTRAMUSCULAR; INTRAVENOUS ONCE AS NEEDED
Status: CANCELLED | OUTPATIENT
Start: 2023-10-24 | End: 2035-03-22

## 2023-10-25 ENCOUNTER — ANESTHESIA (OUTPATIENT)
Dept: SURGERY | Facility: HOSPITAL | Age: 53
End: 2023-10-25
Payer: COMMERCIAL

## 2023-10-25 ENCOUNTER — HOSPITAL ENCOUNTER (OUTPATIENT)
Facility: HOSPITAL | Age: 53
Discharge: HOME | End: 2023-10-25
Attending: OPHTHALMOLOGY | Admitting: OPHTHALMOLOGY
Payer: COMMERCIAL

## 2023-10-25 DIAGNOSIS — H25.043 POSTERIOR SUBCAPSULAR POLAR AGE-RELATED CATARACT, BILATERAL: ICD-10-CM

## 2023-10-25 PROCEDURE — 63600175 PHARM REV CODE 636 W HCPCS: Performed by: ANESTHESIOLOGY

## 2023-10-25 PROCEDURE — D9220A PRA ANESTHESIA: Mod: ANES,,, | Performed by: ANESTHESIOLOGY

## 2023-10-25 PROCEDURE — V2632 POST CHMBR INTRAOCULAR LENS: HCPCS | Performed by: OPHTHALMOLOGY

## 2023-10-25 PROCEDURE — 25000003 PHARM REV CODE 250: Performed by: OPHTHALMOLOGY

## 2023-10-25 PROCEDURE — D9220A PRA ANESTHESIA: ICD-10-PCS | Mod: CRNA,,, | Performed by: NURSE ANESTHETIST, CERTIFIED REGISTERED

## 2023-10-25 PROCEDURE — 71000033 HC RECOVERY, INTIAL HOUR: Performed by: OPHTHALMOLOGY

## 2023-10-25 PROCEDURE — 63600175 PHARM REV CODE 636 W HCPCS: Performed by: NURSE ANESTHETIST, CERTIFIED REGISTERED

## 2023-10-25 PROCEDURE — D9220A PRA ANESTHESIA: Mod: CRNA,,, | Performed by: NURSE ANESTHETIST, CERTIFIED REGISTERED

## 2023-10-25 PROCEDURE — 25000003 PHARM REV CODE 250: Performed by: ANESTHESIOLOGY

## 2023-10-25 PROCEDURE — 37000008 HC ANESTHESIA 1ST 15 MINUTES: Performed by: OPHTHALMOLOGY

## 2023-10-25 PROCEDURE — D9220A PRA ANESTHESIA: ICD-10-PCS | Mod: ANES,,, | Performed by: ANESTHESIOLOGY

## 2023-10-25 PROCEDURE — 36000707: Performed by: OPHTHALMOLOGY

## 2023-10-25 PROCEDURE — 37000009 HC ANESTHESIA EA ADD 15 MINS: Performed by: OPHTHALMOLOGY

## 2023-10-25 PROCEDURE — 63600175 PHARM REV CODE 636 W HCPCS: Performed by: OPHTHALMOLOGY

## 2023-10-25 PROCEDURE — 66984 PR REMOVAL, CATARACT, W/INSRT INTRAOC LENS, W/O ENDO CYCLO: ICD-10-PCS | Mod: LT,,, | Performed by: OPHTHALMOLOGY

## 2023-10-25 PROCEDURE — 25000003 PHARM REV CODE 250

## 2023-10-25 PROCEDURE — 36000706: Performed by: OPHTHALMOLOGY

## 2023-10-25 PROCEDURE — 66984 XCAPSL CTRC RMVL W/O ECP: CPT | Mod: LT,,, | Performed by: OPHTHALMOLOGY

## 2023-10-25 DEVICE — IMPLANTABLE DEVICE: Type: IMPLANTABLE DEVICE | Site: EYE | Status: FUNCTIONAL

## 2023-10-25 RX ORDER — LIDOCAINE HYDROCHLORIDE 40 MG/ML
INJECTION, SOLUTION RETROBULBAR
Status: DISCONTINUED | OUTPATIENT
Start: 2023-10-25 | End: 2023-10-25 | Stop reason: HOSPADM

## 2023-10-25 RX ORDER — LIDOCAINE HYDROCHLORIDE 10 MG/ML
1 INJECTION, SOLUTION EPIDURAL; INFILTRATION; INTRACAUDAL; PERINEURAL ONCE
Status: COMPLETED | OUTPATIENT
Start: 2023-10-25 | End: 2023-10-25

## 2023-10-25 RX ORDER — PROPARACAINE HYDROCHLORIDE 5 MG/ML
1 SOLUTION/ DROPS OPHTHALMIC
Status: DISCONTINUED | OUTPATIENT
Start: 2023-10-25 | End: 2023-10-25 | Stop reason: HOSPADM

## 2023-10-25 RX ORDER — SODIUM CHLORIDE 9 MG/ML
INJECTION, SOLUTION INTRAVENOUS CONTINUOUS
Status: DISCONTINUED | OUTPATIENT
Start: 2023-10-25 | End: 2023-10-25 | Stop reason: HOSPADM

## 2023-10-25 RX ORDER — SODIUM CHLORIDE, SODIUM LACTATE, POTASSIUM CHLORIDE, CALCIUM CHLORIDE 600; 310; 30; 20 MG/100ML; MG/100ML; MG/100ML; MG/100ML
INJECTION, SOLUTION INTRAVENOUS CONTINUOUS
Status: DISCONTINUED | OUTPATIENT
Start: 2023-10-25 | End: 2023-10-25 | Stop reason: HOSPADM

## 2023-10-25 RX ORDER — TROPICAMIDE 10 MG/ML
1 SOLUTION/ DROPS OPHTHALMIC
Status: DISCONTINUED | OUTPATIENT
Start: 2023-10-25 | End: 2023-10-25 | Stop reason: HOSPADM

## 2023-10-25 RX ORDER — TETRACAINE HYDROCHLORIDE 5 MG/ML
SOLUTION OPHTHALMIC
Status: DISCONTINUED | OUTPATIENT
Start: 2023-10-25 | End: 2023-10-25 | Stop reason: HOSPADM

## 2023-10-25 RX ORDER — MIDAZOLAM HYDROCHLORIDE 1 MG/ML
INJECTION INTRAMUSCULAR; INTRAVENOUS
Status: DISCONTINUED | OUTPATIENT
Start: 2023-10-25 | End: 2023-10-25

## 2023-10-25 RX ORDER — EPINEPHRINE 1 MG/ML
INJECTION, SOLUTION, CONCENTRATE INTRAVENOUS
Status: DISCONTINUED | OUTPATIENT
Start: 2023-10-25 | End: 2023-10-25 | Stop reason: HOSPADM

## 2023-10-25 RX ORDER — MOXIFLOXACIN 5 MG/ML
SOLUTION/ DROPS OPHTHALMIC
Status: DISCONTINUED | OUTPATIENT
Start: 2023-10-25 | End: 2023-10-25 | Stop reason: HOSPADM

## 2023-10-25 RX ORDER — PHENYLEPHRINE HYDROCHLORIDE 25 MG/ML
1 SOLUTION/ DROPS OPHTHALMIC
Status: DISCONTINUED | OUTPATIENT
Start: 2023-10-25 | End: 2023-10-25 | Stop reason: HOSPADM

## 2023-10-25 RX ORDER — ONDANSETRON 2 MG/ML
INJECTION INTRAMUSCULAR; INTRAVENOUS
Status: DISCONTINUED | OUTPATIENT
Start: 2023-10-25 | End: 2023-10-25

## 2023-10-25 RX ADMIN — PHENYLEPHRINE HYDROCHLORIDE 1 DROP: 25 SOLUTION/ DROPS OPHTHALMIC at 08:10

## 2023-10-25 RX ADMIN — LIDOCAINE HYDROCHLORIDE 10 MG: 10 SOLUTION INTRAVENOUS at 08:10

## 2023-10-25 RX ADMIN — MIDAZOLAM 1 MG: 1 INJECTION INTRAMUSCULAR; INTRAVENOUS at 10:10

## 2023-10-25 RX ADMIN — PROPARACAINE HYDROCHLORIDE 1 DROP: 5 SOLUTION/ DROPS OPHTHALMIC at 08:10

## 2023-10-25 RX ADMIN — ONDANSETRON 4 MG: 2 INJECTION, SOLUTION INTRAMUSCULAR; INTRAVENOUS at 10:10

## 2023-10-25 RX ADMIN — TROPICAMIDE 1 DROP: 10 SOLUTION/ DROPS OPHTHALMIC at 08:10

## 2023-10-25 RX ADMIN — SODIUM CHLORIDE, POTASSIUM CHLORIDE, SODIUM LACTATE AND CALCIUM CHLORIDE: 600; 310; 30; 20 INJECTION, SOLUTION INTRAVENOUS at 08:10

## 2023-10-25 RX ADMIN — MIDAZOLAM 2 MG: 1 INJECTION INTRAMUSCULAR; INTRAVENOUS at 10:10

## 2023-10-25 NOTE — DISCHARGE SUMMARY
[facility] - Periop Services  Discharge Note  Short Stay    Procedure(s) (LRB):  CEIOL OS (Left)      OUTCOME: Patient tolerated treatment/procedure well without complication and is now ready for discharge.    DISPOSITION: Home or Self Care    FINAL DIAGNOSIS:  cataract    FOLLOWUP: In clinic    DISCHARGE INSTRUCTIONS:  No discharge procedures on file.     TIME SPENT ON DISCHARGE: 5 minutes

## 2023-10-25 NOTE — TRANSFER OF CARE
Anesthesia Transfer of Care Note    Patient: Sondra Beltran    Procedure(s) Performed: Procedure(s) (LRB):  CEIOL OS (Left)    Patient location: PACU    Anesthesia Type: MAC    Transport from OR: Transported from OR on room air with adequate spontaneous ventilation    Post pain: adequate analgesia    Post assessment: no apparent anesthetic complications    Post vital signs: stable    Level of consciousness: awake and alert    Nausea/Vomiting: no nausea/vomiting    Complications: none    Transfer of care protocol was followed      Last vitals:   Visit Vitals  BP (!) 157/71 (BP Location: Left arm, Patient Position: Sitting)   Pulse 67   Temp 36.7 °C (98.1 °F) (Skin)   Resp 18   SpO2 96%   Breastfeeding No

## 2023-10-25 NOTE — ANESTHESIA PREPROCEDURE EVALUATION
10/25/2023  Sondra Beltran is a 53 y.o., female.    Pre-op Assessment    I have reviewed the Patient Summary Reports.    I have reviewed the Nursing Notes. I have reviewed the NPO Status.   I have reviewed the Medications.     Review of Systems  Anesthesia Hx:  No problems with previous Anesthesia    Social:  Non-Smoker    Hematology/Oncology:  Hematology Normal   Oncology Normal     EENT/Dental:EENT/Dental Normal   Cardiovascular:   Exercise tolerance: good Hypertension    Pulmonary:   Asthma    Renal/:  Renal/ Normal     Hepatic/GI:  Hepatic/GI Normal    Musculoskeletal:  Musculoskeletal Normal    Neurological:   Neuromuscular Disease,    Endocrine:  Obesity / BMI > 30  Dermatological:  Skin Normal    Psych:  Psychiatric Normal           Physical Exam  General:  Obesity      Airway/Jaw/Neck:  Airway Findings: Mouth Opening: Normal   Tongue: Normal   General Airway Assessment: Adult Mallampati: II  Improves to I with phonation.        Eyes/Ears/Nose:  EYES/EARS/NOSE FINDINGS: Normal   Dental:  DENTAL FINDINGS: Normal   Chest/Lungs:  Chest/Lungs Findings: Clear to auscultation, Normal Respiratory Rate      Heart/Vascular:  Heart Findings: Rate: Normal  Rhythm: Regular Rhythm        Mental Status:  Mental Status Findings:  Cooperative, Alert and Oriented         Anesthesia Plan  Type of Anesthesia, risks & benefits discussed:  Anesthesia Type:  MAC    Patient's Preference:   Plan Factors:          Intra-op Monitoring Plan: standard ASA monitors  Intra-op Monitoring Plan Comments:   Post Op Pain Control Plan: IV/PO Opioids PRN  Post Op Pain Control Plan Comments:     Induction:   IV  Beta Blocker:  Patient is not currently on a Beta-Blocker (No further documentation required).       Informed Consent: Informed consent signed with the Patient and all parties understand the risks and agree with  anesthesia plan.  All questions answered.  Anesthesia consent signed with patient.  ASA Score: 2     Day of Surgery Review of History & Physical: I have interviewed and examined the patient. I have reviewed the patient's H&P dated:  There are no significant changes.            Ready For Surgery From Anesthesia Perspective.           Physical Exam  General: Obesity    Airway:  Mallampati: II / I  Mouth Opening: Normal  Tongue: Normal    Chest/Lungs:  Clear to auscultation, Normal Respiratory Rate    Heart:  Rate: Normal  Rhythm: Regular Rhythm          Anesthesia Plan  Type of Anesthesia, risks & benefits discussed:    Anesthesia Type: MAC  Intra-op Monitoring Plan: standard ASA monitors  Post Op Pain Control Plan: IV/PO Opioids PRN  Induction:  IV  Informed Consent: Informed consent signed with the Patient and all parties understand the risks and agree with anesthesia plan.  All questions answered.   ASA Score: 2  Day of Surgery Review of History & Physical: I have interviewed and examined the patient. I have reviewed the patient's H&P dated:     Ready For Surgery From Anesthesia Perspective.       .

## 2023-10-25 NOTE — PLAN OF CARE
Discharge instructions given to pt, verbalized understanding.  Tolerating PO fluids.  IV removed.  Denies pain.  Sunglasses on.  Eye drops in pt's possession.  Ambulating out to father  per RN in no distress.

## 2023-10-25 NOTE — H&P
History    Chief complaint:  Painless progressive vision loss left Eye    Present Ilness/Diagnosis: Visually significant cataract, left Eye    ROS: +Eyes, otherwise no significant changes    Past Medical History: refer to chart    Family History/Social History: refer to chart    Allergies:   Review of patient's allergies indicates:   Allergen Reactions    Sulfamethoxazole-trimethoprim Rash and Hives       Current Medications: see medcard      Physical Exam    BP: Vital signs stable  General: No apparent distress  HEENT: cataract  Lungs: adequate respirations  Heart: + pulses  Abdomen: soft  Rectal/pelvic: deferred    Labs: Labs Reviewed    Lab Results   Component Value Date    WBC 6.77 12/09/2022    HGB 13.9 12/09/2022    HCT 42.8 12/09/2022    MCV 92 12/09/2022     12/09/2022           CMP  Sodium   Date Value Ref Range Status   10/05/2023 140 136 - 145 mmol/L Final     Potassium   Date Value Ref Range Status   10/05/2023 4.0 3.5 - 5.1 mmol/L Final     Chloride   Date Value Ref Range Status   10/05/2023 105 95 - 110 mmol/L Final     CO2   Date Value Ref Range Status   10/05/2023 24 23 - 29 mmol/L Final     Glucose   Date Value Ref Range Status   10/05/2023 91 70 - 110 mg/dL Final     BUN   Date Value Ref Range Status   10/05/2023 19 6 - 20 mg/dL Final     Creatinine   Date Value Ref Range Status   10/05/2023 0.7 0.5 - 1.4 mg/dL Final     Calcium   Date Value Ref Range Status   10/05/2023 9.1 8.7 - 10.5 mg/dL Final     Total Protein   Date Value Ref Range Status   10/05/2023 6.7 6.0 - 8.4 g/dL Final     Albumin   Date Value Ref Range Status   10/05/2023 3.9 3.5 - 5.2 g/dL Final     Total Bilirubin   Date Value Ref Range Status   10/05/2023 1.2 (H) 0.1 - 1.0 mg/dL Final     Comment:     For infants and newborns, interpretation of results should be based  on gestational age, weight and in agreement with clinical  observations.    Premature Infant recommended reference ranges:  Up to 24 hours.............<8.0  mg/dL  Up to 48 hours............<12.0 mg/dL  3-5 days..................<15.0 mg/dL  6-29 days.................<15.0 mg/dL       Alkaline Phosphatase   Date Value Ref Range Status   10/05/2023 87 55 - 135 U/L Final     AST   Date Value Ref Range Status   10/05/2023 30 10 - 40 U/L Final     ALT   Date Value Ref Range Status   10/05/2023 37 10 - 44 U/L Final     Anion Gap   Date Value Ref Range Status   10/05/2023 11 8 - 16 mmol/L Final     eGFR   Date Value Ref Range Status   10/05/2023 >60.0 >60 mL/min/1.73 m^2 Final       The patient has been cleared for surgery in an ambulatory surgery facility.     Impression: Visually significant Cataract left Eye    Plan: Phacoemulsification with implantation of Intraocular lens left Eye

## 2023-10-25 NOTE — OP NOTE
Operative Date:  10/25/2023    Discharge Date:  10/25/2023    Report Title: Operative Note    SURGEON: Suhail Espinal MD    ASSISTANT: None    PREOPERATIVE DIAGNOSIS: Posterior subcapsular cataract,  Left Eye    POSTOPERATIVE DIAGNOSIS: same    PROCEDURE PERFORMED: Phacoemulsification of the cataract with posterior chamber intraocular lens Left Eye    IMPLANTS: DCBOO 12.5    ANESTHESIA:  Topical with MAC    COMPLICATIONS: None    ESTIMATED BLOOD LOSS: Minimal    PROCEDURE: The patient was brought to the operating room, time out was performed and implant checked.  The patient was given light sedation, and topical anesthesia was instilled in the left eye.  The left eye was prepped and draped in the usual fashion for eye surgery and lid speculum used to retract the eyelid. The eyelashes were secluded within the drape.  A paracentesis was made inferiorly with a sideport blade. Epishugarcaine was injected in the anterior chamber and dispersive viscoelastic was injected into the anterior chamber. A temporal corneal incision was made with a steel keratome. A cystitome was used to initiate a continuous curvilinear capsulorrhexis and completed using the capsulorrhexis forceps. Hydrodissection of the lens nucleus was performed using balanced salt solution (BSS) on hydrodissection cannula. The lens nucleus was removed using phacoemulsification in the modified stop and chop technique. The lens cortex was removed using the irrigation/aspiration handpiece. The capsular bag was filled with viscoelastic, and the intraocular lens was injected into the capsular bag under direct visualization. Viscoelastic was removed using the irrigation/aspiration handpiece. The wounds were hydrated until watertight.    The wounds were rechecked and no leakage was noted.  The speculum was removed. Topical antibiotic was applied to the eye and shield was placed over the eye. The patient tolerated the procedure well and left the operating room in  good condition.

## 2023-10-26 ENCOUNTER — OFFICE VISIT (OUTPATIENT)
Dept: OPHTHALMOLOGY | Facility: CLINIC | Age: 53
End: 2023-10-26
Payer: COMMERCIAL

## 2023-10-26 VITALS
RESPIRATION RATE: 20 BRPM | SYSTOLIC BLOOD PRESSURE: 125 MMHG | OXYGEN SATURATION: 95 % | HEART RATE: 61 BPM | TEMPERATURE: 98 F | DIASTOLIC BLOOD PRESSURE: 74 MMHG

## 2023-10-26 DIAGNOSIS — Z98.42 STATUS POST CATARACT SURGERY, LEFT: Primary | ICD-10-CM

## 2023-10-26 PROCEDURE — 4010F ACE/ARB THERAPY RXD/TAKEN: CPT | Mod: CPTII,S$GLB,, | Performed by: OPHTHALMOLOGY

## 2023-10-26 PROCEDURE — 1159F PR MEDICATION LIST DOCUMENTED IN MEDICAL RECORD: ICD-10-PCS | Mod: CPTII,S$GLB,, | Performed by: OPHTHALMOLOGY

## 2023-10-26 PROCEDURE — 1160F PR REVIEW ALL MEDS BY PRESCRIBER/CLIN PHARMACIST DOCUMENTED: ICD-10-PCS | Mod: CPTII,S$GLB,, | Performed by: OPHTHALMOLOGY

## 2023-10-26 PROCEDURE — 99999 PR PBB SHADOW E&M-EST. PATIENT-LVL IV: ICD-10-PCS | Mod: PBBFAC,,, | Performed by: OPHTHALMOLOGY

## 2023-10-26 PROCEDURE — 4010F PR ACE/ARB THEARPY RXD/TAKEN: ICD-10-PCS | Mod: CPTII,S$GLB,, | Performed by: OPHTHALMOLOGY

## 2023-10-26 PROCEDURE — 1160F RVW MEDS BY RX/DR IN RCRD: CPT | Mod: CPTII,S$GLB,, | Performed by: OPHTHALMOLOGY

## 2023-10-26 PROCEDURE — 1159F MED LIST DOCD IN RCRD: CPT | Mod: CPTII,S$GLB,, | Performed by: OPHTHALMOLOGY

## 2023-10-26 PROCEDURE — 99999 PR PBB SHADOW E&M-EST. PATIENT-LVL IV: CPT | Mod: PBBFAC,,, | Performed by: OPHTHALMOLOGY

## 2023-10-26 PROCEDURE — 99024 POSTOP FOLLOW-UP VISIT: CPT | Mod: S$GLB,,, | Performed by: OPHTHALMOLOGY

## 2023-10-26 PROCEDURE — 99024 PR POST-OP FOLLOW-UP VISIT: ICD-10-PCS | Mod: S$GLB,,, | Performed by: OPHTHALMOLOGY

## 2023-10-26 NOTE — PROGRESS NOTES
HPI    1 day s/p phaco IOL OS done on 10/25/2023  Pt states OS feels well. Denies pain/ FOL/ floaters.   Compliant with post op gtts. Using pred, moxi, and keto as directed.     Last edited by Mirna Whiteside on 10/26/2023  8:28 AM.            Assessment /Plan     For exam results, see Encounter Report.    Status post cataract surgery, left      Doing well  Post op precautions and instructions reviewed, sheet given  moxi QID  PF QID  Keto qdaily    F/u 1 week, brief refract, PW for OD

## 2023-10-26 NOTE — ANESTHESIA POSTPROCEDURE EVALUATION
Anesthesia Post Evaluation    Patient: Sondra Beltran    Procedure(s) Performed: Procedure(s) (LRB):  CEIOL OS (Left)    Final Anesthesia Type: MAC      Patient location during evaluation: PACU  Patient participation: Yes- Able to Participate  Level of consciousness: awake and alert and oriented  Post-procedure vital signs: reviewed and stable  Pain management: adequate  Airway patency: patent    PONV status at discharge: No PONV  Anesthetic complications: no      Cardiovascular status: blood pressure returned to baseline  Respiratory status: unassisted, spontaneous ventilation and room air  Hydration status: euvolemic  Follow-up not needed.          Vitals Value Taken Time   /74 10/25/23 1046   Temp 36.8 °C (98.2 °F) 10/25/23 1027   Pulse 60 10/25/23 1050   Resp 20 10/25/23 1045   SpO2 95 % 10/25/23 1049   Vitals shown include unvalidated device data.      Event Time   Out of Recovery 10:55:00         Pain/Flores Score: Flores Score: 10 (10/25/2023 10:55 AM)

## 2023-11-02 ENCOUNTER — OFFICE VISIT (OUTPATIENT)
Dept: OPHTHALMOLOGY | Facility: CLINIC | Age: 53
End: 2023-11-02
Payer: COMMERCIAL

## 2023-11-02 DIAGNOSIS — H25.041 POSTERIOR SUBCAPSULAR AGE-RELATED CATARACT, RIGHT EYE: ICD-10-CM

## 2023-11-02 DIAGNOSIS — Z98.42 STATUS POST CATARACT SURGERY, LEFT: Primary | ICD-10-CM

## 2023-11-02 PROCEDURE — 99024 POSTOP FOLLOW-UP VISIT: CPT | Mod: S$GLB,,, | Performed by: OPHTHALMOLOGY

## 2023-11-02 PROCEDURE — 99024 PR POST-OP FOLLOW-UP VISIT: ICD-10-PCS | Mod: S$GLB,,, | Performed by: OPHTHALMOLOGY

## 2023-11-02 PROCEDURE — 4010F ACE/ARB THERAPY RXD/TAKEN: CPT | Mod: CPTII,S$GLB,, | Performed by: OPHTHALMOLOGY

## 2023-11-02 PROCEDURE — 1159F MED LIST DOCD IN RCRD: CPT | Mod: CPTII,S$GLB,, | Performed by: OPHTHALMOLOGY

## 2023-11-02 PROCEDURE — 1160F RVW MEDS BY RX/DR IN RCRD: CPT | Mod: CPTII,S$GLB,, | Performed by: OPHTHALMOLOGY

## 2023-11-02 PROCEDURE — 92136 IOL MASTER - OD - RIGHT EYE: ICD-10-PCS | Mod: RT,S$GLB,, | Performed by: OPHTHALMOLOGY

## 2023-11-02 PROCEDURE — 1160F PR REVIEW ALL MEDS BY PRESCRIBER/CLIN PHARMACIST DOCUMENTED: ICD-10-PCS | Mod: CPTII,S$GLB,, | Performed by: OPHTHALMOLOGY

## 2023-11-02 PROCEDURE — 92136 OPHTHALMIC BIOMETRY: CPT | Mod: RT,S$GLB,, | Performed by: OPHTHALMOLOGY

## 2023-11-02 PROCEDURE — 4010F PR ACE/ARB THEARPY RXD/TAKEN: ICD-10-PCS | Mod: CPTII,S$GLB,, | Performed by: OPHTHALMOLOGY

## 2023-11-02 PROCEDURE — 1159F PR MEDICATION LIST DOCUMENTED IN MEDICAL RECORD: ICD-10-PCS | Mod: CPTII,S$GLB,, | Performed by: OPHTHALMOLOGY

## 2023-11-02 PROCEDURE — 99999 PR PBB SHADOW E&M-EST. PATIENT-LVL IV: CPT | Mod: PBBFAC,,, | Performed by: OPHTHALMOLOGY

## 2023-11-02 PROCEDURE — 99999 PR PBB SHADOW E&M-EST. PATIENT-LVL IV: ICD-10-PCS | Mod: PBBFAC,,, | Performed by: OPHTHALMOLOGY

## 2023-11-02 RX ORDER — KETOROLAC TROMETHAMINE 5 MG/ML
1 SOLUTION OPHTHALMIC 4 TIMES DAILY
Qty: 5 ML | Refills: 2 | Status: SHIPPED | OUTPATIENT
Start: 2023-11-02 | End: 2023-12-14 | Stop reason: ALTCHOICE

## 2023-11-02 RX ORDER — SODIUM CHLORIDE 9 MG/ML
INJECTION, SOLUTION INTRAVENOUS CONTINUOUS
Status: CANCELLED | OUTPATIENT
Start: 2023-11-02

## 2023-11-02 RX ORDER — PREDNISOLONE ACETATE 10 MG/ML
1 SUSPENSION/ DROPS OPHTHALMIC 4 TIMES DAILY
Qty: 5 ML | Refills: 2 | Status: SHIPPED | OUTPATIENT
Start: 2023-11-02 | End: 2023-12-14 | Stop reason: ALTCHOICE

## 2023-11-02 RX ORDER — PROPARACAINE HYDROCHLORIDE 5 MG/ML
1 SOLUTION/ DROPS OPHTHALMIC
Status: CANCELLED | OUTPATIENT
Start: 2023-11-02

## 2023-11-02 RX ORDER — TROPICAMIDE 10 MG/ML
1 SOLUTION/ DROPS OPHTHALMIC
Status: CANCELLED | OUTPATIENT
Start: 2023-11-02

## 2023-11-02 RX ORDER — MOXIFLOXACIN 5 MG/ML
1 SOLUTION/ DROPS OPHTHALMIC 4 TIMES DAILY
Qty: 3 ML | Refills: 0 | Status: SHIPPED | OUTPATIENT
Start: 2023-11-02 | End: 2023-11-10

## 2023-11-02 RX ORDER — PHENYLEPHRINE HYDROCHLORIDE 100 MG/ML
1 SOLUTION/ DROPS OPHTHALMIC
Status: CANCELLED | OUTPATIENT
Start: 2023-11-02

## 2023-11-02 RX ORDER — PHENYLEPHRINE HYDROCHLORIDE 25 MG/ML
1 SOLUTION/ DROPS OPHTHALMIC
Status: CANCELLED | OUTPATIENT
Start: 2023-11-02

## 2023-11-02 NOTE — PROGRESS NOTES
HPI    1 week s/p phaco IOL OS done on 10/25  Pt states OS feels well. Denies pain/ FOL/ floaters.   Compliant with post op gtts. Using pred, moxi, and keto as directed.        Pre op cat sx OD scheduled for 11/15    States vision feels off with OD not having surgery yet.     Last edited by Mirna Whiteside on 11/2/2023  8:32 AM.            Assessment /Plan     For exam results, see Encounter Report.    Status post cataract surgery, left    Posterior subcapsular age-related cataract, right eye      1. Status post cataract surgery, left  POW1 CEIOL  Doing well  D/c moxi  Continue PF QID with taper  Continue keto qdaily  Post op instructions reviewed      2. Posterior subcapsular age-related cataract, right eye  Patient with visually significant cataract impacting abiliity ADLs (reading, driving, PM driving, glare).  Discussed options, R & B, expectations, patient voices good understanding and wishes to proceed with procedure. Patient will likely benefit from sx and signed consent.    Proceed with CEIOL OD  Monofocal distance IOL

## 2023-11-02 NOTE — PROGRESS NOTES
Subjective:       Patient ID: Sondra Beltran is a 53 y.o. female.    Chief Complaint: blurry vision OD    Past Medical History:   Diagnosis Date    Asthma     Last ER visit approx 2010. usually after illness    Benign essential HTN 06/19/2017    CVID (common variable immunodeficiency)     Neck strain 05/17/2016    Thyroid nodule 06/28/2017     Past Surgical History:   Procedure Laterality Date    CATARACT EXTRACTION W/  INTRAOCULAR LENS IMPLANT Left 10/25/2023    Procedure: CEIOL OS;  Surgeon: Suhail Espinal MD;  Location: [facility] OR;  Service: Ophthalmology;  Laterality: Left;    cervial cone surgery      CLOSURE OF VESICOVAGINAL FISTULA N/A 06/06/2019    Procedure: excision of vaginal lesion vs urethral diverticulectomy;  Surgeon: Sondra Cobb MD;  Location: [facility] OR;  Service: Urology;  Laterality: N/A;   to assist, first case, please put  as co-surgeon    COLONOSCOPY N/A 5/29/2023    Procedure: COLONOSCOPY;  Surgeon: Yimi Gan MD;  Location: [facility];  Service: Endoscopy;  Laterality: N/A;    CYSTOSCOPY N/A 04/22/2019    Procedure: CYSTOSCOPY;  Surgeon: Sondra Cobb MD;  [facility];  Service: Urology;  Laterality: N/A;    HYSTERECTOMY      KNEE SURGERY      nose surgery      VAGINOSCOPY N/A 04/22/2019    Procedure: VAGINOSCOPY;  Surgeon: Sondra Cobb MD;  [facility];  Service: Urology;  Laterality: N/A;     N/A  Family history non-contributory to current problem   Social History     Socioeconomic History    Marital status: Other   Tobacco Use    Smoking status: Never    Smokeless tobacco: Never   Substance and Sexual Activity    Alcohol use: No    Drug use: No       Current Outpatient Medications   Medication Sig Dispense Refill    albuterol (ACCUNEB) 1.25 mg/3 mL Nebu Inhale 6 mL 3 times a day by inhalation route.      albuterol sulfate (INV ALBUTEROL) 90 mcg inhalation Inhale into the lungs as needed. Take one puff by mouth  as directed by Physician. Investigational Medication. Patient Study#    .      ascorbic acid, vitamin C, (VITAMIN C) 500 MG tablet Take 500 mg by mouth 3 (three) times daily.      azelastine (ASTELIN) 137 mcg (0.1 %) nasal spray SMARTSIG:Both Nares  Strength: 137 mcg (0.1 %) 30 mL 3    budesonide-formoterol 160-4.5 mcg (SYMBICORT) 160-4.5 mcg/actuation HFAA Inhale 2 puffs into the lungs every 12 (twelve) hours. Controller 6 g 3    CLENPIQ 10 mg-3.5 gram -12 gram/160 mL Soln Prior to colonoscopy      fluticasone propionate (FLONASE) 50 mcg/actuation nasal spray 2 sprays (100 mcg total) by Each Nostril route 2 (two) times daily. 9.9 g 3    guaifenesin/pseudoephedrne HCl (MUCINEX D ORAL) Take by mouth.      hydrocodone-chlorpheniramine (TUSSIONEX) 10-8 mg/5 mL suspension Take 5 mL every day by oral route in the evening.      hydrOXYzine HCL (ATARAX) 25 MG tablet Take 1 tablet (25 mg total) by mouth 3 (three) times daily. (Patient not taking: Reported on 10/5/2023) 30 tablet 3    ketorolac 0.5% (ACULAR) 0.5 % Drop Place 1 drop into the left eye 4 (four) times daily. Start 3 days before surgery. 5 mL 2    ketorolac 0.5% (ACULAR) 0.5 % Drop Place 1 drop into the right eye 4 (four) times daily. Start 3 days before surgery. 5 mL 2    levocetirizine (XYZAL) 5 MG tablet Take 5 mg by mouth every evening.      mometasone (NASONEX) 50 mcg/actuation nasal spray Spray 2 sprays every day by intranasal route.      montelukast (SINGULAIR) 10 mg tablet Take 10 mg by mouth.      moxifloxacin (VIGAMOX) 0.5 % ophthalmic solution Place 1 drop into the right eye 4 (four) times daily. Start 1 day before cataract surgery. for 8 days 3 mL 0    nystatin (MYCOSTATIN) powder Apply topically 2 (two) times daily. (Patient not taking: Reported on 7/13/2023) 15 g 0    olmesartan (BENICAR) 5 MG Tab Take 1 tablet (5 mg total) by mouth once daily. 90 tablet 1    prednisoLONE acetate (PRED FORTE) 1 % DrpS Place 1 drop into the left eye 4 (four) times  daily. Start after cataract surgery. 5 mL 2    prednisoLONE acetate (PRED FORTE) 1 % DrpS Place 1 drop into the right eye 4 (four) times daily. Start after surgery 5 mL 2    selenium 50 mcg Tab Take 200 mcg by mouth once daily.      tiotropium bromide (SPIRIVA RESPIMAT) 1.25 mcg/actuation inhaler Inhale 2 puffs into the lungs once daily. Controller 4 g 0    valACYclovir (VALTREX) 1000 MG tablet Take 2 tablets (2,000 mg total) by mouth every 12 (twelve) hours. (Patient not taking: Reported on 5/24/2023) 4 tablet 2     No current facility-administered medications for this visit.     Review of patient's allergies indicates:   Allergen Reactions    Sulfamethoxazole-trimethoprim Rash and Hives       Review of Systems   All other systems reviewed and are negative.      Objective:      There were no vitals filed for this visit.  Physical Exam  Eyes:      Pupils: Pupils are equal, round, and reactive to light.   Cardiovascular:      Rate and Rhythm: Normal rate.   Pulmonary:      Effort: Pulmonary effort is normal.   Abdominal:      Palpations: Abdomen is soft.   Neurological:      General: No focal deficit present.      Mental Status: She is alert.         Lab Review: CBC:   Lab Results   Component Value Date    WBC 6.77 12/09/2022    RBC 4.67 12/09/2022    HGB 13.9 12/09/2022    HCT 42.8 12/09/2022     12/09/2022     BMP:   Lab Results   Component Value Date    GLU 91 10/05/2023     10/05/2023    K 4.0 10/05/2023     10/05/2023    CO2 24 10/05/2023    BUN 19 10/05/2023    CREATININE 0.7 10/05/2023    CALCIUM 9.1 10/05/2023          Assessment:       1. Status post cataract surgery, left    2. Posterior subcapsular age-related cataract, right eye        Plan:       Low risk for low risk surgery, see prior PCP clearance. Okay to proceed with cataract surgery.

## 2023-11-13 NOTE — PROGRESS NOTES
I have reviewed the notes, assessments, and/or procedures performed by Carol Fischer, MARIA ELENA, I concur with her documentation of Sondra Beltran

## 2023-11-14 ENCOUNTER — ANESTHESIA EVENT (OUTPATIENT)
Dept: SURGERY | Facility: HOSPITAL | Age: 53
End: 2023-11-14
Payer: COMMERCIAL

## 2023-11-14 NOTE — DISCHARGE INSTRUCTIONS
Discharge Instructions for Cataract Surgery    USE DROPS AS INSTRUCTED:     PREDNISOLONE  ONE DROP 4 TIMES A DAY  Moxifloxacin ONE DROP 4 TIMES A DAY  KETOROLAC ONE DROP 4 TIMES A DAY   WAIT 2 MINUTES BETWEEN DROPS     BRING ALL EYE DROPS TO YOUR CLINIC VISITS    Wear sunglasses during the day  Do not sleep on the affected side and wear eye shield while sleeping for 2 weeks.  No exercise or lifting greater than 10 lbs for 2 weeks.  Try not to cough. If coughing a lot, take a cough suppressent  Do not bend over for 2 weeks.  Keep water it of your eye for 2 weeks.             Wear sunglasses for comfort as needed.  It is normal to feel a slight irritation, like there is an eyelash in the eye that had surgery.   You may take Tylenol for discomfort but if pain intensifies , call Dr     If you use eye drops for glaucoma you may continue to use them.      After Surgery:  Always be aware that any surgery can cause these symptoms:    Pain- Medication can be prescribed for pain to decrease your pain but may not completely take your pain away.  Over the Counter pain medicine my be enough and you can always use Ice and rest to help ease pain.    Bleeding- a little bleeding after a surgery is usually within normal.  If there is a lot of blood you need to notify your MD.  Emergency treatments of bleeding are cold application, elevation of the bleeding site and compression.    Infection- Infection after surgery is NOT a normal occurrence.  Signs of infection are fever, swelling, hot to touch the incision.  If this occurs notify your MD immediately.    Nausea- this can be common after a surgery especially if you have had anesthesia medicine or are taking pain medicine.  Staying on clear liquids, bland foods, gingerale, or over the counter anti nausea medicines can help.  If you vomit more than once, notify your MD.  Anti Nausea medicines can be prescribed.Discharge Instructions for Cataract Surgery    USE DROPS AS INSTRUCTED:      PREDNISOLONE  ONE DROP 4 TIMES A DAY  Moxifloxacin ONE DROP 4 TIMES A DAY  KETOROLAC ONE DROP 4 TIMES A DAY   WAIT 2 MINUTES BETWEEN DROPS     BRING ALL EYE DROPS TO YOUR CLINIC VISITS    Wear sunglasses during the day  Do not sleep on the affected side and wear eye shield while sleeping for 2 weeks.  No exercise or lifting greater than 10 lbs for 2 weeks.  Try not to cough. If coughing a lot, take a cough suppressent  Do not bend over for 2 weeks.  Keep water it of your eye for 2 weeks.             Wear sunglasses for comfort as needed.  It is normal to feel a slight irritation, like there is an eyelash in the eye that had surgery.   You may take Tylenol for discomfort but if pain intensifies , call Dr     If you use eye drops for glaucoma you may continue to use them.      After Surgery:  Always be aware that any surgery can cause these symptoms:    Pain- Medication can be prescribed for pain to decrease your pain but may not completely take your pain away.  Over the Counter pain medicine my be enough and you can always use Ice and rest to help ease pain.    Bleeding- a little bleeding after a surgery is usually within normal.  If there is a lot of blood you need to notify your MD.  Emergency treatments of bleeding are cold application, elevation of the bleeding site and compression.    Infection- Infection after surgery is NOT a normal occurrence.  Signs of infection are fever, swelling, hot to touch the incision.  If this occurs notify your MD immediately.    Nausea- this can be common after a surgery especially if you have had anesthesia medicine or are taking pain medicine.  Staying on clear liquids, bland foods, gingerale, or over the counter anti nausea medicines can help.  If you vomit more than once, notify your MD.  Anti Nausea medicines can be prescribed.

## 2023-11-15 ENCOUNTER — ANESTHESIA (OUTPATIENT)
Dept: SURGERY | Facility: HOSPITAL | Age: 53
End: 2023-11-15
Payer: COMMERCIAL

## 2023-11-15 ENCOUNTER — HOSPITAL ENCOUNTER (OUTPATIENT)
Facility: HOSPITAL | Age: 53
Discharge: HOME | End: 2023-11-15
Attending: OPHTHALMOLOGY | Admitting: OPHTHALMOLOGY
Payer: COMMERCIAL

## 2023-11-15 DIAGNOSIS — H25.041 POSTERIOR SUBCAPSULAR AGE-RELATED CATARACT, RIGHT EYE: ICD-10-CM

## 2023-11-15 DIAGNOSIS — Z98.42 STATUS POST CATARACT SURGERY, LEFT: ICD-10-CM

## 2023-11-15 PROCEDURE — 25000003 PHARM REV CODE 250

## 2023-11-15 PROCEDURE — D9220A PRA ANESTHESIA: Mod: ANES,,, | Performed by: ANESTHESIOLOGY

## 2023-11-15 PROCEDURE — 63600175 PHARM REV CODE 636 W HCPCS: Performed by: OPHTHALMOLOGY

## 2023-11-15 PROCEDURE — D9220A PRA ANESTHESIA: ICD-10-PCS | Mod: CRNA,,, | Performed by: NURSE ANESTHETIST, CERTIFIED REGISTERED

## 2023-11-15 PROCEDURE — 25000003 PHARM REV CODE 250: Performed by: OPHTHALMOLOGY

## 2023-11-15 PROCEDURE — 63600175 PHARM REV CODE 636 W HCPCS: Performed by: NURSE ANESTHETIST, CERTIFIED REGISTERED

## 2023-11-15 PROCEDURE — 63600175 PHARM REV CODE 636 W HCPCS: Performed by: ANESTHESIOLOGY

## 2023-11-15 PROCEDURE — 37000009 HC ANESTHESIA EA ADD 15 MINS: Performed by: OPHTHALMOLOGY

## 2023-11-15 PROCEDURE — 36000706: Performed by: OPHTHALMOLOGY

## 2023-11-15 PROCEDURE — V2632 POST CHMBR INTRAOCULAR LENS: HCPCS | Performed by: OPHTHALMOLOGY

## 2023-11-15 PROCEDURE — D9220A PRA ANESTHESIA: Mod: CRNA,,, | Performed by: NURSE ANESTHETIST, CERTIFIED REGISTERED

## 2023-11-15 PROCEDURE — 66984 PR REMOVAL, CATARACT, W/INSRT INTRAOC LENS, W/O ENDO CYCLO: ICD-10-PCS | Mod: 79,RT,, | Performed by: OPHTHALMOLOGY

## 2023-11-15 PROCEDURE — 71000033 HC RECOVERY, INTIAL HOUR: Performed by: OPHTHALMOLOGY

## 2023-11-15 PROCEDURE — 36000707: Performed by: OPHTHALMOLOGY

## 2023-11-15 PROCEDURE — 25000003 PHARM REV CODE 250: Performed by: ANESTHESIOLOGY

## 2023-11-15 PROCEDURE — 66984 XCAPSL CTRC RMVL W/O ECP: CPT | Mod: 79,RT,, | Performed by: OPHTHALMOLOGY

## 2023-11-15 PROCEDURE — 37000008 HC ANESTHESIA 1ST 15 MINUTES: Performed by: OPHTHALMOLOGY

## 2023-11-15 PROCEDURE — D9220A PRA ANESTHESIA: ICD-10-PCS | Mod: ANES,,, | Performed by: ANESTHESIOLOGY

## 2023-11-15 DEVICE — IMPLANTABLE DEVICE: Type: IMPLANTABLE DEVICE | Site: EYE | Status: FUNCTIONAL

## 2023-11-15 RX ORDER — PHENYLEPHRINE HYDROCHLORIDE 100 MG/ML
1 SOLUTION/ DROPS OPHTHALMIC
Status: DISCONTINUED | OUTPATIENT
Start: 2023-11-15 | End: 2023-11-15 | Stop reason: HOSPADM

## 2023-11-15 RX ORDER — TETRACAINE HYDROCHLORIDE 5 MG/ML
SOLUTION OPHTHALMIC
Status: DISCONTINUED | OUTPATIENT
Start: 2023-11-15 | End: 2023-11-15 | Stop reason: HOSPADM

## 2023-11-15 RX ORDER — MOXIFLOXACIN 5 MG/ML
SOLUTION/ DROPS OPHTHALMIC
Status: DISCONTINUED | OUTPATIENT
Start: 2023-11-15 | End: 2023-11-15 | Stop reason: HOSPADM

## 2023-11-15 RX ORDER — SODIUM CHLORIDE 9 MG/ML
INJECTION, SOLUTION INTRAVENOUS CONTINUOUS
Status: DISCONTINUED | OUTPATIENT
Start: 2023-11-15 | End: 2023-11-15 | Stop reason: HOSPADM

## 2023-11-15 RX ORDER — SODIUM CHLORIDE, SODIUM LACTATE, POTASSIUM CHLORIDE, CALCIUM CHLORIDE 600; 310; 30; 20 MG/100ML; MG/100ML; MG/100ML; MG/100ML
INJECTION, SOLUTION INTRAVENOUS CONTINUOUS
Status: DISCONTINUED | OUTPATIENT
Start: 2023-11-15 | End: 2023-11-15 | Stop reason: HOSPADM

## 2023-11-15 RX ORDER — METOCLOPRAMIDE HYDROCHLORIDE 5 MG/ML
10 INJECTION INTRAMUSCULAR; INTRAVENOUS EVERY 10 MIN PRN
Status: DISCONTINUED | OUTPATIENT
Start: 2023-11-15 | End: 2023-11-15 | Stop reason: HOSPADM

## 2023-11-15 RX ORDER — PHENYLEPHRINE HYDROCHLORIDE 25 MG/ML
1 SOLUTION/ DROPS OPHTHALMIC
Status: DISCONTINUED | OUTPATIENT
Start: 2023-11-15 | End: 2023-11-15 | Stop reason: HOSPADM

## 2023-11-15 RX ORDER — MIDAZOLAM HYDROCHLORIDE 1 MG/ML
INJECTION INTRAMUSCULAR; INTRAVENOUS
Status: DISCONTINUED | OUTPATIENT
Start: 2023-11-15 | End: 2023-11-15

## 2023-11-15 RX ORDER — LIDOCAINE HYDROCHLORIDE 40 MG/ML
INJECTION, SOLUTION RETROBULBAR
Status: DISCONTINUED | OUTPATIENT
Start: 2023-11-15 | End: 2023-11-15 | Stop reason: HOSPADM

## 2023-11-15 RX ORDER — PROPARACAINE HYDROCHLORIDE 5 MG/ML
1 SOLUTION/ DROPS OPHTHALMIC
Status: DISCONTINUED | OUTPATIENT
Start: 2023-11-15 | End: 2023-11-15 | Stop reason: HOSPADM

## 2023-11-15 RX ORDER — EPINEPHRINE 1 MG/ML
INJECTION, SOLUTION, CONCENTRATE INTRAVENOUS
Status: DISCONTINUED | OUTPATIENT
Start: 2023-11-15 | End: 2023-11-15 | Stop reason: HOSPADM

## 2023-11-15 RX ORDER — ONDANSETRON 2 MG/ML
INJECTION INTRAMUSCULAR; INTRAVENOUS
Status: DISCONTINUED | OUTPATIENT
Start: 2023-11-15 | End: 2023-11-15

## 2023-11-15 RX ORDER — TROPICAMIDE 10 MG/ML
1 SOLUTION/ DROPS OPHTHALMIC
Status: DISCONTINUED | OUTPATIENT
Start: 2023-11-15 | End: 2023-11-15 | Stop reason: HOSPADM

## 2023-11-15 RX ORDER — LIDOCAINE HYDROCHLORIDE 10 MG/ML
1 INJECTION, SOLUTION EPIDURAL; INFILTRATION; INTRACAUDAL; PERINEURAL ONCE
Status: COMPLETED | OUTPATIENT
Start: 2023-11-15 | End: 2023-11-15

## 2023-11-15 RX ADMIN — PHENYLEPHRINE HYDROCHLORIDE 1 DROP: 25 SOLUTION/ DROPS OPHTHALMIC at 08:11

## 2023-11-15 RX ADMIN — LIDOCAINE HYDROCHLORIDE 0.2 MG: 10 INJECTION, SOLUTION EPIDURAL; INFILTRATION; INTRACAUDAL; PERINEURAL at 08:11

## 2023-11-15 RX ADMIN — TROPICAMIDE 1 DROP: 10 SOLUTION/ DROPS OPHTHALMIC at 08:11

## 2023-11-15 RX ADMIN — PHENYLEPHRINE HYDROCHLORIDE 1 DROP: 25 SOLUTION/ DROPS OPHTHALMIC at 09:11

## 2023-11-15 RX ADMIN — MIDAZOLAM HYDROCHLORIDE 2 MG: 1 INJECTION, SOLUTION INTRAMUSCULAR; INTRAVENOUS at 10:11

## 2023-11-15 RX ADMIN — SODIUM CHLORIDE, POTASSIUM CHLORIDE, SODIUM LACTATE AND CALCIUM CHLORIDE: 600; 310; 30; 20 INJECTION, SOLUTION INTRAVENOUS at 11:11

## 2023-11-15 RX ADMIN — PROPARACAINE HYDROCHLORIDE 1 DROP: 5 SOLUTION/ DROPS OPHTHALMIC at 09:11

## 2023-11-15 RX ADMIN — PROPARACAINE HYDROCHLORIDE 1 DROP: 5 SOLUTION/ DROPS OPHTHALMIC at 08:11

## 2023-11-15 RX ADMIN — TROPICAMIDE 1 DROP: 10 SOLUTION/ DROPS OPHTHALMIC at 09:11

## 2023-11-15 RX ADMIN — ONDANSETRON 4 MG: 2 INJECTION, SOLUTION INTRAMUSCULAR; INTRAVENOUS at 10:11

## 2023-11-15 RX ADMIN — SODIUM CHLORIDE, POTASSIUM CHLORIDE, SODIUM LACTATE AND CALCIUM CHLORIDE 500 ML: 600; 310; 30; 20 INJECTION, SOLUTION INTRAVENOUS at 08:11

## 2023-11-15 NOTE — H&P
History    Chief complaint:  Painless progressive vision loss right Eye    Present Ilness/Diagnosis: Visually significant cataract, right Eye    ROS: +Eyes, otherwise no significant changes    Past Medical History: refer to chart    Family History/Social History: refer to chart    Allergies:   Review of patient's allergies indicates:   Allergen Reactions    Sulfamethoxazole-trimethoprim Rash and Hives       Current Medications: see medcard      Physical Exam    BP: Vital signs stable  General: No apparent distress  HEENT: cataract  Lungs: adequate respirations  Heart: + pulses  Abdomen: soft  Rectal/pelvic: deferred    Labs: Labs Reviewed    Lab Results   Component Value Date    WBC 6.77 12/09/2022    HGB 13.9 12/09/2022    HCT 42.8 12/09/2022    MCV 92 12/09/2022     12/09/2022           CMP  Sodium   Date Value Ref Range Status   10/05/2023 140 136 - 145 mmol/L Final     Potassium   Date Value Ref Range Status   10/05/2023 4.0 3.5 - 5.1 mmol/L Final     Chloride   Date Value Ref Range Status   10/05/2023 105 95 - 110 mmol/L Final     CO2   Date Value Ref Range Status   10/05/2023 24 23 - 29 mmol/L Final     Glucose   Date Value Ref Range Status   10/05/2023 91 70 - 110 mg/dL Final     BUN   Date Value Ref Range Status   10/05/2023 19 6 - 20 mg/dL Final     Creatinine   Date Value Ref Range Status   10/05/2023 0.7 0.5 - 1.4 mg/dL Final     Calcium   Date Value Ref Range Status   10/05/2023 9.1 8.7 - 10.5 mg/dL Final     Total Protein   Date Value Ref Range Status   10/05/2023 6.7 6.0 - 8.4 g/dL Final     Albumin   Date Value Ref Range Status   10/05/2023 3.9 3.5 - 5.2 g/dL Final     Total Bilirubin   Date Value Ref Range Status   10/05/2023 1.2 (H) 0.1 - 1.0 mg/dL Final     Comment:     For infants and newborns, interpretation of results should be based  on gestational age, weight and in agreement with clinical  observations.    Premature Infant recommended reference ranges:  Up to 24 hours.............<8.0  mg/dL  Up to 48 hours............<12.0 mg/dL  3-5 days..................<15.0 mg/dL  6-29 days.................<15.0 mg/dL       Alkaline Phosphatase   Date Value Ref Range Status   10/05/2023 87 55 - 135 U/L Final     AST   Date Value Ref Range Status   10/05/2023 30 10 - 40 U/L Final     ALT   Date Value Ref Range Status   10/05/2023 37 10 - 44 U/L Final     Anion Gap   Date Value Ref Range Status   10/05/2023 11 8 - 16 mmol/L Final     eGFR   Date Value Ref Range Status   10/05/2023 >60.0 >60 mL/min/1.73 m^2 Final       The patient has been cleared for surgery in an ambulatory surgery facility.     Impression: Visually significant Cataract right Eye    Plan: Phacoemulsification with implantation of Intraocular lens right Eye

## 2023-11-15 NOTE — TRANSFER OF CARE
"Anesthesia Transfer of Care Note    Patient: Sondra Beltran    Procedure(s) Performed: Procedure(s) (LRB):  CEIOL OD (Right)    Patient location: PACU    Anesthesia Type: MAC    Transport from OR: Transported from OR on room air with adequate spontaneous ventilation    Post pain: adequate analgesia    Post assessment: no apparent anesthetic complications and tolerated procedure well    Post vital signs: stable    Level of consciousness: awake, alert and oriented    Nausea/Vomiting: no nausea/vomiting    Complications: none    Transfer of care protocol was followed      Last vitals: Visit Vitals  BP (!) 161/77 (BP Location: Left arm, Patient Position: Sitting)   Pulse 71   Temp 36.5 °C (97.7 °F) (Skin)   Resp 20   Ht 5' 4" (1.626 m)   Wt 88.8 kg (195 lb 12.3 oz)   SpO2 97%   Breastfeeding No   BMI 33.60 kg/m²     "

## 2023-11-15 NOTE — PLAN OF CARE
Pt DC to car by WC, father here to take pt home. Pt given shield, tape, bag and lens ID. Pt DC wearing sunglasses provided in OR. Aware to follow up tomorrow morning in office. Pt states she has all eye drops.

## 2023-11-15 NOTE — OP NOTE
Operative Date:  11/15/2023    Discharge Date:  11/15/2023    Report Title: Operative Note    SURGEON: Suhail Espinal MD    ASSISTANT: None    PREOPERATIVE DIAGNOSIS: Posterior subcapsular cataract, Right Eye    POSTOPERATIVE DIAGNOSIS: same    PROCEDURE PERFORMED: Phacoemulsification of the cataract with posterior chamber intraocular lens Right Eye    IMPLANTS: DCB00 13.5    ANESTHESIA:  Topical with MAC    COMPLICATIONS: None    ESTIMATED BLOOD LOSS: Minimal    PROCEDURE: The patient was brought to the operating room, time out was performed and implant checked.  The patient was given light sedation, and topical anesthesia was instilled in the right eye.  The right eye was prepped and draped in the usual fashion for eye surgery and lid speculum used to retract the eyelid. The eyelashes were secluded within the drape.  A paracentesis was made superiorly with a sideport blade. Epishugarcaine was injected in the anterior chamber and dispersive viscoelastic was injected into the anterior chamber. A temporal corneal incision was made with a steel keratome. A cystitome was used to initiate a continuous curvilinear capsulorrhexis and completed using the capsulorrhexis forceps. Hydrodissection of the lens nucleus was performed using balanced salt solution (BSS) on hydrodissection cannula. The lens nucleus was removed using phacoemulsification in the modified stop and chop technique. The lens cortex was removed using the irrigation/aspiration handpiece. The capsular bag was filled with viscoelastic, and the intraocular lens was injected into the capsular bag under direct visualization. Viscoelastic was removed using the irrigation/aspiration handpiece. The wounds were hydrated until watertight.    The wounds were rechecked and no leakage was noted.  The speculum was removed. Topical antibiotic was applied to the eye and shield was placed over the eye. The patient tolerated the procedure well and left the operating room in  good condition.

## 2023-11-15 NOTE — ANESTHESIA PREPROCEDURE EVALUATION
11/15/2023  Sonrda Beltran is a 53 y.o., female.      Pre-op Assessment    I have reviewed the Patient Summary Reports.     I have reviewed the Nursing Notes. I have reviewed the NPO Status.   I have reviewed the Medications.     Review of Systems  Anesthesia Hx:             Denies Family Hx of Anesthesia complications.    Denies Personal Hx of Anesthesia complications.                    Cardiovascular:     Hypertension                                        Pulmonary:    Asthma moderate and severe                   Endocrine:     CVID      Obesity / BMI > 30      Physical Exam  General: Cooperative, Alert and Oriented    Airway:  Mallampati: II / I  Mouth Opening: Normal  TM Distance: Normal  Tongue: Normal  Neck: Girth Increased        Anesthesia Plan  Type of Anesthesia, risks & benefits discussed:    Anesthesia Type: MAC  Intra-op Monitoring Plan: Standard ASA Monitors  Post Op Pain Control Plan: multimodal analgesia  Informed Consent: Informed consent signed with the Patient and all parties understand the risks and agree with anesthesia plan.  All questions answered.   ASA Score: 3    Ready For Surgery From Anesthesia Perspective.     .

## 2023-11-15 NOTE — ANESTHESIA POSTPROCEDURE EVALUATION
Anesthesia Post Evaluation    Patient: Sondra Beltran    Procedure(s) Performed: Procedure(s) (LRB):  CEIOL OD (Right)    Final Anesthesia Type: MAC      Patient location during evaluation: PACU  Patient participation: Yes- Able to Participate  Level of consciousness: awake and alert  Post-procedure vital signs: reviewed and stable  Pain management: adequate  Airway patency: patent    PONV status at discharge: No PONV  Anesthetic complications: no      Cardiovascular status: blood pressure returned to baseline  Respiratory status: unassisted  Hydration status: euvolemic  Follow-up not needed.          Vitals Value Taken Time   /74 11/15/23 1136   Temp 36.4 °C (97.5 °F) 11/15/23 1116   Pulse 66 11/15/23 1137   Resp 18 11/15/23 1135   SpO2 95 % 11/15/23 1137   Vitals shown include unvalidated device data.      Event Time   Out of Recovery 11:47:35         Pain/Flores Score: Flores Score: 10 (11/15/2023 11:45 AM)

## 2023-11-15 NOTE — DISCHARGE SUMMARY
[facility]  Discharge Note  Short Stay    Procedure(s) (LRB):  CEIOL OD (Right)      OUTCOME: Patient tolerated treatment/procedure well without complication and is now ready for discharge.    DISPOSITION: Home or Self Care    FINAL DIAGNOSIS:  cataract    FOLLOWUP: In clinic    DISCHARGE INSTRUCTIONS:  No discharge procedures on file.     TIME SPENT ON DISCHARGE: 5 minutes

## 2023-11-16 ENCOUNTER — OFFICE VISIT (OUTPATIENT)
Dept: OPHTHALMOLOGY | Facility: CLINIC | Age: 53
End: 2023-11-16
Payer: COMMERCIAL

## 2023-11-16 VITALS
RESPIRATION RATE: 18 BRPM | WEIGHT: 195.75 LBS | HEIGHT: 64 IN | OXYGEN SATURATION: 95 % | DIASTOLIC BLOOD PRESSURE: 74 MMHG | BODY MASS INDEX: 33.42 KG/M2 | SYSTOLIC BLOOD PRESSURE: 147 MMHG | HEART RATE: 71 BPM | TEMPERATURE: 98 F

## 2023-11-16 DIAGNOSIS — Z98.41 STATUS POST CATARACT SURGERY, RIGHT: Primary | ICD-10-CM

## 2023-11-16 PROCEDURE — 99024 POSTOP FOLLOW-UP VISIT: CPT | Mod: S$GLB,,, | Performed by: OPHTHALMOLOGY

## 2023-11-16 PROCEDURE — 1160F RVW MEDS BY RX/DR IN RCRD: CPT | Mod: CPTII,S$GLB,, | Performed by: OPHTHALMOLOGY

## 2023-11-16 PROCEDURE — 99999 PR PBB SHADOW E&M-EST. PATIENT-LVL III: CPT | Mod: PBBFAC,,, | Performed by: OPHTHALMOLOGY

## 2023-11-16 PROCEDURE — 99024 PR POST-OP FOLLOW-UP VISIT: ICD-10-PCS | Mod: S$GLB,,, | Performed by: OPHTHALMOLOGY

## 2023-11-16 PROCEDURE — 4010F ACE/ARB THERAPY RXD/TAKEN: CPT | Mod: CPTII,S$GLB,, | Performed by: OPHTHALMOLOGY

## 2023-11-16 PROCEDURE — 99999 PR PBB SHADOW E&M-EST. PATIENT-LVL III: ICD-10-PCS | Mod: PBBFAC,,, | Performed by: OPHTHALMOLOGY

## 2023-11-16 PROCEDURE — 1159F PR MEDICATION LIST DOCUMENTED IN MEDICAL RECORD: ICD-10-PCS | Mod: CPTII,S$GLB,, | Performed by: OPHTHALMOLOGY

## 2023-11-16 PROCEDURE — 1160F PR REVIEW ALL MEDS BY PRESCRIBER/CLIN PHARMACIST DOCUMENTED: ICD-10-PCS | Mod: CPTII,S$GLB,, | Performed by: OPHTHALMOLOGY

## 2023-11-16 PROCEDURE — 4010F PR ACE/ARB THEARPY RXD/TAKEN: ICD-10-PCS | Mod: CPTII,S$GLB,, | Performed by: OPHTHALMOLOGY

## 2023-11-16 PROCEDURE — 1159F MED LIST DOCD IN RCRD: CPT | Mod: CPTII,S$GLB,, | Performed by: OPHTHALMOLOGY

## 2023-11-16 NOTE — PROGRESS NOTES
HPI     Post-op Evaluation     Additional comments: 1 day post Phaco IOL Right eye. Denies eye pain   today. Used all gtts as directed.           Last edited by Genoveva Lipscomb on 11/16/2023  8:47 AM.            Assessment /Plan     For exam results, see Encounter Report.    Status post cataract surgery, right      Doing well  Post op precautions and instructions reviewed, sheet given  moxi QID  PF QID  Keto qdaily    F/u 1 month, refract PRN

## 2023-11-23 ENCOUNTER — HOSPITAL ENCOUNTER (EMERGENCY)
Facility: HOSPITAL | Age: 53
Discharge: HOME | End: 2023-11-23
Attending: EMERGENCY MEDICINE
Payer: COMMERCIAL

## 2023-11-23 VITALS
HEIGHT: 64 IN | DIASTOLIC BLOOD PRESSURE: 89 MMHG | HEART RATE: 86 BPM | SYSTOLIC BLOOD PRESSURE: 198 MMHG | TEMPERATURE: 99 F | OXYGEN SATURATION: 96 % | BODY MASS INDEX: 33.29 KG/M2 | RESPIRATION RATE: 18 BRPM | WEIGHT: 195 LBS

## 2023-11-23 DIAGNOSIS — R52 PAIN: Primary | ICD-10-CM

## 2023-11-23 DIAGNOSIS — S92.515A NONDISPLACED FRACTURE OF PROXIMAL PHALANX OF LEFT LESSER TOE(S), INITIAL ENCOUNTER FOR CLOSED FRACTURE: ICD-10-CM

## 2023-11-23 PROCEDURE — 25000003 PHARM REV CODE 250: Performed by: NURSE PRACTITIONER

## 2023-11-23 PROCEDURE — 99283 EMERGENCY DEPT VISIT LOW MDM: CPT

## 2023-11-23 RX ORDER — ACETAMINOPHEN 500 MG
1000 TABLET ORAL
Status: COMPLETED | OUTPATIENT
Start: 2023-11-23 | End: 2023-11-23

## 2023-11-23 RX ADMIN — ACETAMINOPHEN 1000 MG: 500 TABLET ORAL at 06:11

## 2023-11-23 NOTE — Clinical Note
"Sondra"Edison Beltran was seen and treated in our emergency department on 11/23/2023.  She may return to work on 11/24/2023.  Light duty only. Minimize time standing or walking.      If you have any questions or concerns, please don't hesitate to call.      Alex Gifford MD"

## 2023-12-07 ENCOUNTER — OFFICE VISIT (OUTPATIENT)
Dept: PODIATRY | Facility: CLINIC | Age: 53
End: 2023-12-07
Payer: COMMERCIAL

## 2023-12-07 VITALS — OXYGEN SATURATION: 96 % | WEIGHT: 195.13 LBS | HEIGHT: 64 IN | BODY MASS INDEX: 33.31 KG/M2 | HEART RATE: 82 BPM

## 2023-12-07 DIAGNOSIS — L60.2 ONYCHOGRYPHOSIS: ICD-10-CM

## 2023-12-07 DIAGNOSIS — S92.515A CLOSED NONDISPLACED FRACTURE OF PROXIMAL PHALANX OF LESSER TOE OF LEFT FOOT, INITIAL ENCOUNTER: ICD-10-CM

## 2023-12-07 DIAGNOSIS — E66.9 OBESITY (BMI 30-39.9): ICD-10-CM

## 2023-12-07 DIAGNOSIS — R26.2 DIFFICULTY IN WALKING, NOT ELSEWHERE CLASSIFIED: ICD-10-CM

## 2023-12-07 DIAGNOSIS — M79.675 PAIN OF TOE OF LEFT FOOT: Primary | ICD-10-CM

## 2023-12-07 PROCEDURE — 1160F RVW MEDS BY RX/DR IN RCRD: CPT | Mod: CPTII,S$GLB,, | Performed by: PODIATRIST

## 2023-12-07 PROCEDURE — 3008F BODY MASS INDEX DOCD: CPT | Mod: CPTII,S$GLB,, | Performed by: PODIATRIST

## 2023-12-07 PROCEDURE — 4010F ACE/ARB THERAPY RXD/TAKEN: CPT | Mod: CPTII,S$GLB,, | Performed by: PODIATRIST

## 2023-12-07 PROCEDURE — 99213 OFFICE O/P EST LOW 20 MIN: CPT | Mod: S$GLB,,, | Performed by: PODIATRIST

## 2023-12-07 PROCEDURE — 99999 PR PBB SHADOW E&M-EST. PATIENT-LVL V: CPT | Mod: PBBFAC,,, | Performed by: PODIATRIST

## 2023-12-07 PROCEDURE — 1160F PR REVIEW ALL MEDS BY PRESCRIBER/CLIN PHARMACIST DOCUMENTED: ICD-10-PCS | Mod: CPTII,S$GLB,, | Performed by: PODIATRIST

## 2023-12-07 PROCEDURE — 3008F PR BODY MASS INDEX (BMI) DOCUMENTED: ICD-10-PCS | Mod: CPTII,S$GLB,, | Performed by: PODIATRIST

## 2023-12-07 PROCEDURE — 1159F PR MEDICATION LIST DOCUMENTED IN MEDICAL RECORD: ICD-10-PCS | Mod: CPTII,S$GLB,, | Performed by: PODIATRIST

## 2023-12-07 PROCEDURE — 1159F MED LIST DOCD IN RCRD: CPT | Mod: CPTII,S$GLB,, | Performed by: PODIATRIST

## 2023-12-07 PROCEDURE — 99213 PR OFFICE/OUTPT VISIT, EST, LEVL III, 20-29 MIN: ICD-10-PCS | Mod: S$GLB,,, | Performed by: PODIATRIST

## 2023-12-07 PROCEDURE — 4010F PR ACE/ARB THEARPY RXD/TAKEN: ICD-10-PCS | Mod: CPTII,S$GLB,, | Performed by: PODIATRIST

## 2023-12-07 PROCEDURE — 99999 PR PBB SHADOW E&M-EST. PATIENT-LVL V: ICD-10-PCS | Mod: PBBFAC,,, | Performed by: PODIATRIST

## 2023-12-07 NOTE — LETTER
December 7, 2023      [facility] - Podiatry  [address]  [address]  [place] [address]  Phone: [phone]  Fax: [phone]       Patient: Sondra Beltran   YOB: 1970  Date of Visit: 12/07/2023    To Whom It May Concern:    Felicity Beltran  was at [facility] on 12/07/2023. The patient may return to work on 12/08/2023 with restrictions. Please allow patient to wear tennis shoes starting 01/01/2024.  If you have any questions or concerns, or if I can be of further assistance, please do not hesitate to contact me.    Sincerely, Electronically Signed by: GARETT Stack MA

## 2023-12-14 ENCOUNTER — TELEPHONE (OUTPATIENT)
Dept: FAMILY MEDICINE | Facility: CLINIC | Age: 53
End: 2023-12-14
Payer: COMMERCIAL

## 2023-12-14 ENCOUNTER — OFFICE VISIT (OUTPATIENT)
Dept: OPHTHALMOLOGY | Facility: CLINIC | Age: 53
End: 2023-12-14
Payer: COMMERCIAL

## 2023-12-14 DIAGNOSIS — Z98.41 STATUS POST CATARACT SURGERY, RIGHT: Primary | ICD-10-CM

## 2023-12-14 DIAGNOSIS — J45.50 SEVERE PERSISTENT ASTHMA WITHOUT COMPLICATION: ICD-10-CM

## 2023-12-14 PROCEDURE — 99024 PR POST-OP FOLLOW-UP VISIT: ICD-10-PCS | Mod: S$GLB,,, | Performed by: OPHTHALMOLOGY

## 2023-12-14 PROCEDURE — 99999 PR PBB SHADOW E&M-EST. PATIENT-LVL III: ICD-10-PCS | Mod: PBBFAC,,, | Performed by: OPHTHALMOLOGY

## 2023-12-14 PROCEDURE — 99024 POSTOP FOLLOW-UP VISIT: CPT | Mod: S$GLB,,, | Performed by: OPHTHALMOLOGY

## 2023-12-14 PROCEDURE — 92015 DETERMINE REFRACTIVE STATE: CPT | Mod: S$GLB,,, | Performed by: OPHTHALMOLOGY

## 2023-12-14 PROCEDURE — 99999 PR PBB SHADOW E&M-EST. PATIENT-LVL III: CPT | Mod: PBBFAC,,, | Performed by: OPHTHALMOLOGY

## 2023-12-14 PROCEDURE — 92015 PR REFRACTION: ICD-10-PCS | Mod: S$GLB,,, | Performed by: OPHTHALMOLOGY

## 2023-12-14 PROCEDURE — 1159F MED LIST DOCD IN RCRD: CPT | Mod: CPTII,S$GLB,, | Performed by: OPHTHALMOLOGY

## 2023-12-14 PROCEDURE — 4010F PR ACE/ARB THEARPY RXD/TAKEN: ICD-10-PCS | Mod: CPTII,S$GLB,, | Performed by: OPHTHALMOLOGY

## 2023-12-14 PROCEDURE — 1159F PR MEDICATION LIST DOCUMENTED IN MEDICAL RECORD: ICD-10-PCS | Mod: CPTII,S$GLB,, | Performed by: OPHTHALMOLOGY

## 2023-12-14 PROCEDURE — 4010F ACE/ARB THERAPY RXD/TAKEN: CPT | Mod: CPTII,S$GLB,, | Performed by: OPHTHALMOLOGY

## 2023-12-14 NOTE — TELEPHONE ENCOUNTER
----- Message from Yaw Slaughter sent at 12/14/2023  2:22 PM CST -----  Regarding: Refill request  Type:  RX Refill Request    Who Called: PT  Refill or New Rx:refill    RX Name and Strength:tiotropium bromide (SPIRIVA RESPIMAT) 1.25 mcg/actuation inhaler  How is the patient currently taking it? (ex. 1XDay):as directed  Is this a 30 day or 90 day RX:30    Preferred Pharmacy with phone number:  [facility] - [place], [place] - [address]  [address] [facility] [place] [address]  Phone: [phone] Fax: [phone]      Local or Mail Order:local  Ordering Provider:     Would the patient rather a call back or a response via [facility]? Call back    Best Call Back Number:[phone]      Additional Information: Was supposed to have an appt today and is out but the office canceled her appt.     Please advise -- Thank you

## 2023-12-14 NOTE — TELEPHONE ENCOUNTER
Called and spoke to patient regarding appointment on today with BERNARDINO Mccracken. Informed patient that Ms. Crews is gone for today (sick) and that her appointment needs to be rescheduled. Patient stated that she is at the dentist and will call back to rescheduled her appointment.

## 2023-12-14 NOTE — PROGRESS NOTES
HPI    Pt presents for one month post op PCIOL OU     States vision could be better     Keto QD OD   PF BID OD     Last edited by Nicol Garcia on 12/14/2023  9:47 AM.            Assessment /Plan     For exam results, see Encounter Report.    Status post cataract surgery, right      POM1 CEIOL OD, POM2 OS  Doing well  Stop post operative drops    ATs PRN    New glasses Rx today    F/u 1 year, routine exam  dr. Holguin

## 2023-12-18 RX ORDER — TIOTROPIUM BROMIDE INHALATION SPRAY 1.56 UG/1
2.5 SPRAY, METERED RESPIRATORY (INHALATION) DAILY
Qty: 4 G | Refills: 0 | Status: SHIPPED | OUTPATIENT
Start: 2023-12-18 | End: 2024-01-05 | Stop reason: SDUPTHER

## 2024-01-05 ENCOUNTER — OFFICE VISIT (OUTPATIENT)
Dept: FAMILY MEDICINE | Facility: CLINIC | Age: 54
End: 2024-01-05
Payer: COMMERCIAL

## 2024-01-05 VITALS
HEIGHT: 64 IN | RESPIRATION RATE: 18 BRPM | DIASTOLIC BLOOD PRESSURE: 74 MMHG | WEIGHT: 199.94 LBS | OXYGEN SATURATION: 97 % | SYSTOLIC BLOOD PRESSURE: 116 MMHG | HEART RATE: 69 BPM | BODY MASS INDEX: 34.13 KG/M2

## 2024-01-05 DIAGNOSIS — D83.9 CVID (COMMON VARIABLE IMMUNODEFICIENCY): ICD-10-CM

## 2024-01-05 DIAGNOSIS — Z00.00 ROUTINE GENERAL MEDICAL EXAMINATION AT A HEALTH CARE FACILITY: Primary | ICD-10-CM

## 2024-01-05 DIAGNOSIS — J30.9 CHRONIC ALLERGIC RHINITIS: ICD-10-CM

## 2024-01-05 DIAGNOSIS — Z12.31 ENCOUNTER FOR SCREENING MAMMOGRAM FOR MALIGNANT NEOPLASM OF BREAST: ICD-10-CM

## 2024-01-05 DIAGNOSIS — J45.50 SEVERE PERSISTENT ASTHMA WITHOUT COMPLICATION: ICD-10-CM

## 2024-01-05 DIAGNOSIS — I10 BENIGN ESSENTIAL HTN: ICD-10-CM

## 2024-01-05 PROCEDURE — 99999 PR PBB SHADOW E&M-EST. PATIENT-LVL V: CPT | Mod: PBBFAC,,, | Performed by: STUDENT IN AN ORGANIZED HEALTH CARE EDUCATION/TRAINING PROGRAM

## 2024-01-05 PROCEDURE — 3078F DIAST BP <80 MM HG: CPT | Mod: CPTII,S$GLB,, | Performed by: STUDENT IN AN ORGANIZED HEALTH CARE EDUCATION/TRAINING PROGRAM

## 2024-01-05 PROCEDURE — 3074F SYST BP LT 130 MM HG: CPT | Mod: CPTII,S$GLB,, | Performed by: STUDENT IN AN ORGANIZED HEALTH CARE EDUCATION/TRAINING PROGRAM

## 2024-01-05 PROCEDURE — 1160F RVW MEDS BY RX/DR IN RCRD: CPT | Mod: CPTII,S$GLB,, | Performed by: STUDENT IN AN ORGANIZED HEALTH CARE EDUCATION/TRAINING PROGRAM

## 2024-01-05 PROCEDURE — 1159F MED LIST DOCD IN RCRD: CPT | Mod: CPTII,S$GLB,, | Performed by: STUDENT IN AN ORGANIZED HEALTH CARE EDUCATION/TRAINING PROGRAM

## 2024-01-05 PROCEDURE — 4010F ACE/ARB THERAPY RXD/TAKEN: CPT | Mod: CPTII,S$GLB,, | Performed by: STUDENT IN AN ORGANIZED HEALTH CARE EDUCATION/TRAINING PROGRAM

## 2024-01-05 PROCEDURE — 99396 PREV VISIT EST AGE 40-64: CPT | Mod: S$GLB,,, | Performed by: STUDENT IN AN ORGANIZED HEALTH CARE EDUCATION/TRAINING PROGRAM

## 2024-01-05 PROCEDURE — 3008F BODY MASS INDEX DOCD: CPT | Mod: CPTII,S$GLB,, | Performed by: STUDENT IN AN ORGANIZED HEALTH CARE EDUCATION/TRAINING PROGRAM

## 2024-01-05 RX ORDER — OLMESARTAN MEDOXOMIL 5 MG/1
5 TABLET ORAL DAILY
Qty: 90 TABLET | Refills: 2 | Status: SHIPPED | OUTPATIENT
Start: 2024-01-05 | End: 2024-02-19

## 2024-01-05 RX ORDER — AZELASTINE 1 MG/ML
SPRAY, METERED NASAL
Qty: 30 ML | Refills: 3 | Status: SHIPPED | OUTPATIENT
Start: 2024-01-05 | End: 2024-01-05

## 2024-01-05 RX ORDER — TIOTROPIUM BROMIDE INHALATION SPRAY 1.56 UG/1
2.5 SPRAY, METERED RESPIRATORY (INHALATION) DAILY
Qty: 4 G | Refills: 3 | Status: SHIPPED | OUTPATIENT
Start: 2024-01-05

## 2024-01-05 RX ORDER — FLUTICASONE PROPIONATE 50 MCG
2 SPRAY, SUSPENSION (ML) NASAL 2 TIMES DAILY
Qty: 9.9 G | Refills: 3 | Status: SHIPPED | OUTPATIENT
Start: 2024-01-05

## 2024-01-05 RX ORDER — BUDESONIDE AND FORMOTEROL FUMARATE DIHYDRATE 160; 4.5 UG/1; UG/1
2 AEROSOL RESPIRATORY (INHALATION) EVERY 12 HOURS
Qty: 6 G | Refills: 3 | Status: SHIPPED | OUTPATIENT
Start: 2024-01-05

## 2024-01-05 RX ORDER — AZELASTINE 1 MG/ML
SPRAY, METERED NASAL
Qty: 30 ML | Refills: 3 | Status: SHIPPED | OUTPATIENT
Start: 2024-01-05

## 2024-01-05 NOTE — PROGRESS NOTES
"[facility]   OFFICE VISIT NOTE    Patient Name: Sondra Beltran  YOB: 1970    PRESENTING HISTORY     History of Present Illness:  Ms. Sondra Beltran is a 53 y.o. female here to establish care   Medical history notable for severe persistent asthma, lung nodules, HTN, CVID, obesity, thyroid nodule     Meds: albuterol, symbicort,  flonase, levocetrizine, astelin nasal spray, singulair, olmesartan, spiriva     Specialists:  - Dr. Maria Barnett -- allergy/immunology for CVID   - ob/gyn for women's care including pap smear. Next one scheduled for 2024.        Partial hysterectomy more than 10 years ago for fibroids     Patient works as a teller at a [employer]   Per patient, HTN and asthma have been well controlled   No acute complaints today     Review of Systems   Constitutional:  Negative for chills, diaphoresis, fatigue and fever.   Respiratory:  Negative for cough, shortness of breath and wheezing.    Cardiovascular:  Negative for chest pain and palpitations.   Gastrointestinal:  Negative for constipation, diarrhea, nausea and vomiting.   Genitourinary:  Negative for bladder incontinence.   Neurological:  Negative for coordination difficulties.   Psychiatric/Behavioral:  Negative for behavioral problems.           OBJECTIVE:   Vital Signs:  Vitals:    24 0920   BP: 116/74   Pulse: 69   Resp: 18   SpO2: 97%   Weight: 90.7 kg (199 lb 15.3 oz)   Height: 5' 4" (1.626 m)           Physical Exam  Constitutional:       General: She is not in acute distress.     Appearance: She is not ill-appearing or toxic-appearing.   HENT:      Head: Normocephalic and atraumatic.      Mouth/Throat:      Pharynx: Uvula midline. No pharyngeal swelling.   Cardiovascular:      Rate and Rhythm: Normal rate and regular rhythm.   Pulmonary:      Effort: Pulmonary effort is normal. No tachypnea, bradypnea, accessory muscle usage, prolonged expiration or respiratory distress. "      Breath sounds: Normal breath sounds. No stridor. No wheezing, rhonchi or rales.   Neurological:      General: No focal deficit present.      Mental Status: She is alert.   Psychiatric:         Mood and Affect: Mood normal.         Behavior: Behavior normal.         ASSESSMENT & PLAN:     Routine general medical examination at a health care facility  -     CBC Without Differential; Future; Expected date: 01/05/2024  -     Comprehensive Metabolic Panel; Future; Expected date: 01/05/2024  -     Lipid Panel; Future; Expected date: 01/05/2024  -     Hemoglobin A1C; Future; Expected date: 01/05/2024     Severe persistent asthma without complication  -     budesonide-formoterol 160-4.5 mcg (SYMBICORT) 160-4.5 mcg/actuation HFAA; Inhale 2 puffs into the lungs every 12 (twelve) hours. Controller  Dispense: 6 g; Refill: 3  -     tiotropium bromide (SPIRIVA RESPIMAT) 1.25 mcg/actuation inhaler; Inhale 2 puffs into the lungs once daily. Controller  Dispense: 4 g; Refill: 3  No wheezing or increased work of breathing noted today on exam     CVID (common variable immunodeficiency)  Continue with allergy/immunology follow up     Chronic allergic rhinitis  -     fluticasone propionate (FLONASE) 50 mcg/actuation nasal spray; 2 sprays (100 mcg total) by Each Nostril route 2 (two) times daily.  Dispense: 9.9 g; Refill: 3  -     Discontinue: azelastine (ASTELIN) 137 mcg (0.1 %) nasal spray; SMARTSIG:Both Nares  Strength: 137 mcg (0.1 %)  Dispense: 30 mL; Refill: 3  -     azelastine (ASTELIN) 137 mcg (0.1 %) nasal spray; SMARTSIG:Both Nares  Strength: 137 mcg (0.1 %)  Dispense: 30 mL; Refill: 3    Encounter for screening mammogram for malignant neoplasm of breast  -     Mammo Digital Screening Bilat w/ Toño; Future; Expected date: 03/18/2024  Last one done in March 2023     Benign essential HTN  -     olmesartan (BENICAR) 5 MG Tab; Take 1 tablet (5 mg total) by mouth once daily.  Dispense: 90 tablet; Refill: 2  Continue with  Olmesartan. Her blood pressure is well controlled today        Zahra Scott MD  Family Medicine  [facility]     This note was created using MModal voice recognition software that occasionally misinterprets phrases or words

## 2024-01-05 NOTE — PATIENT INSTRUCTIONS
Celestino Amaro,     If you are due for any health screening(s) below please notify me so we can arrange them to be ordered and scheduled. Most healthy patients at your age complete them, but you are free to accept or refuse.     If you can't do it, I'll definitely understand. If you can, I'd certainly appreciate it!    All of your core healthy metrics are met.

## 2024-02-17 DIAGNOSIS — I10 BENIGN ESSENTIAL HTN: ICD-10-CM

## 2024-02-19 RX ORDER — OLMESARTAN MEDOXOMIL 5 MG/1
5 TABLET ORAL
Qty: 90 TABLET | Refills: 0 | Status: SHIPPED | OUTPATIENT
Start: 2024-02-19

## 2024-03-14 ENCOUNTER — HOSPITAL ENCOUNTER (OUTPATIENT)
Dept: RADIOLOGY | Facility: HOSPITAL | Age: 54
Discharge: HOME | End: 2024-03-14
Attending: STUDENT IN AN ORGANIZED HEALTH CARE EDUCATION/TRAINING PROGRAM
Payer: COMMERCIAL

## 2024-03-14 VITALS — WEIGHT: 199.94 LBS | BODY MASS INDEX: 34.13 KG/M2 | HEIGHT: 64 IN

## 2024-03-14 DIAGNOSIS — Z12.31 ENCOUNTER FOR SCREENING MAMMOGRAM FOR MALIGNANT NEOPLASM OF BREAST: ICD-10-CM

## 2024-03-14 PROCEDURE — 77067 SCR MAMMO BI INCL CAD: CPT | Mod: TC,PO

## 2024-03-20 ENCOUNTER — OFFICE VISIT (OUTPATIENT)
Dept: PODIATRY | Facility: CLINIC | Age: 54
End: 2024-03-20
Payer: COMMERCIAL

## 2024-03-20 VITALS — WEIGHT: 199.94 LBS | BODY MASS INDEX: 34.13 KG/M2 | HEIGHT: 64 IN

## 2024-03-20 DIAGNOSIS — Q82.8 POROKERATOSIS: ICD-10-CM

## 2024-03-20 DIAGNOSIS — M77.42 METATARSALGIA OF LEFT FOOT: Primary | ICD-10-CM

## 2024-03-20 PROCEDURE — 99999 PR PBB SHADOW E&M-EST. PATIENT-LVL III: CPT | Mod: PBBFAC,,, | Performed by: STUDENT IN AN ORGANIZED HEALTH CARE EDUCATION/TRAINING PROGRAM

## 2024-03-20 PROCEDURE — 3008F BODY MASS INDEX DOCD: CPT | Mod: CPTII,S$GLB,, | Performed by: STUDENT IN AN ORGANIZED HEALTH CARE EDUCATION/TRAINING PROGRAM

## 2024-03-20 PROCEDURE — 99214 OFFICE O/P EST MOD 30 MIN: CPT | Mod: 57,S$GLB,, | Performed by: STUDENT IN AN ORGANIZED HEALTH CARE EDUCATION/TRAINING PROGRAM

## 2024-03-20 PROCEDURE — 4010F ACE/ARB THERAPY RXD/TAKEN: CPT | Mod: CPTII,S$GLB,, | Performed by: STUDENT IN AN ORGANIZED HEALTH CARE EDUCATION/TRAINING PROGRAM

## 2024-03-20 PROCEDURE — 1159F MED LIST DOCD IN RCRD: CPT | Mod: CPTII,S$GLB,, | Performed by: STUDENT IN AN ORGANIZED HEALTH CARE EDUCATION/TRAINING PROGRAM

## 2024-03-20 PROCEDURE — 1160F RVW MEDS BY RX/DR IN RCRD: CPT | Mod: CPTII,S$GLB,, | Performed by: STUDENT IN AN ORGANIZED HEALTH CARE EDUCATION/TRAINING PROGRAM

## 2024-03-20 NOTE — PROGRESS NOTES
Subjective:      Patient ID: Sondra Beltran is a 54 y.o. female.    Chief Complaint: Foot Pain    Ms. Beltran presents today with pain submet 5 of the L foot. She also has pain to the submet areas of the 2,3,4 of the same foot. Pain seems to have spread to the R foot as well. Pain is worse with weightbearing and she is unable to walk barefooted. She still wears the boot from time to time from her toe fracture.    3/25/24: She'd like excision of the spot.       Review of Systems   Musculoskeletal:         Pain both feet   All other systems reviewed and are negative.          Objective:      Physical Exam  Cardiovascular:      Pulses:           Dorsalis pedis pulses are 2+ on the left side.        Posterior tibial pulses are 2+ on the left side.   Feet:      Comments: Focal hyperkeratotic lesion submet 5 L foot. Painful with direct palpation. There is also pain with palpation of the 2,3,4 metatarsal heads.             Assessment:       Encounter Diagnoses   Name Primary?    Metatarsalgia of left foot Yes    Porokeratosis          Plan:       Sondra was seen today for foot pain.    Diagnoses and all orders for this visit:    Metatarsalgia of left foot    Porokeratosis      I counseled the patient on her conditions, their implications and medical management.    Plan for excision at follow up of the poro to biopsy and to see if the mass with fully resolve.    Demetrio Justice DPM

## 2024-03-28 ENCOUNTER — PROCEDURE VISIT (OUTPATIENT)
Dept: PODIATRY | Facility: CLINIC | Age: 54
End: 2024-03-28
Payer: COMMERCIAL

## 2024-03-28 VITALS — HEIGHT: 64 IN | WEIGHT: 199.94 LBS | BODY MASS INDEX: 34.13 KG/M2

## 2024-03-28 DIAGNOSIS — Q82.8 POROKERATOSIS: Primary | ICD-10-CM

## 2024-03-28 PROCEDURE — 88305 TISSUE EXAM BY PATHOLOGIST: CPT | Mod: 26,,, | Performed by: PATHOLOGY

## 2024-03-28 PROCEDURE — 88342 IMHCHEM/IMCYTCHM 1ST ANTB: CPT | Mod: PO | Performed by: PATHOLOGY

## 2024-03-28 PROCEDURE — 11420 EXC H-F-NK-SP B9+MARG 0.5/<: CPT | Mod: S$GLB,,, | Performed by: STUDENT IN AN ORGANIZED HEALTH CARE EDUCATION/TRAINING PROGRAM

## 2024-03-28 PROCEDURE — 88342 IMHCHEM/IMCYTCHM 1ST ANTB: CPT | Mod: 26,,, | Performed by: PATHOLOGY

## 2024-03-28 PROCEDURE — 88312 SPECIAL STAINS GROUP 1: CPT | Mod: 26,,, | Performed by: PATHOLOGY

## 2024-03-28 PROCEDURE — 88305 TISSUE EXAM BY PATHOLOGIST: CPT | Mod: PO | Performed by: PATHOLOGY

## 2024-03-28 PROCEDURE — 88312 SPECIAL STAINS GROUP 1: CPT | Mod: PO | Performed by: PATHOLOGY

## 2024-03-28 NOTE — PROCEDURES
Excision of Lesion    Date/Time: 3/28/2024 1:00 PM    Performed by: Demetrio Justice DPM  Authorized by: Demetrio Justice DPM    Consent Done?:  Yes (Verbal)  Timeout: prior to procedure the correct patient, procedure, and site was verified    Local anesthesia used?: Yes    Anesthesia:  Local infiltration  Anesthetic total (ml):  4  Assistants?: No     I was present for the entire procedure.  : Porokeratosis.  Body area:  Foot  Laterality:  Left  Position:  Supine  Anesthesia:  Local infiltration  Excision type:  Skin  Malignancy:  Benign  Excision size (cm):  0.3  Scalpel size: 3mm punch.  Specimens?: Yes     Specimens submitted to pathology.   Hemostasis was obtained.  Estimated blood loss (cc):  1  Wound closure:  Simple  Wound repair size (cm):  0.3  Sutures: 4-0 nylon.  Sterile dressings:  Other (comments) (triple antibiotic, mepilex, cast padding and ACE.)   Post-operative instructions were provided for the patient.   Patient was discharged and will follow up for wound check and pathology results.

## 2024-04-03 LAB
FINAL PATHOLOGIC DIAGNOSIS: NORMAL
GROSS: NORMAL
Lab: NORMAL
MICROSCOPIC EXAM: NORMAL

## 2024-04-04 ENCOUNTER — OFFICE VISIT (OUTPATIENT)
Dept: PODIATRY | Facility: CLINIC | Age: 54
End: 2024-04-04
Payer: COMMERCIAL

## 2024-04-04 VITALS — HEIGHT: 64 IN | BODY MASS INDEX: 34.13 KG/M2 | WEIGHT: 199.94 LBS

## 2024-04-04 DIAGNOSIS — Q82.8 POROKERATOSIS: Primary | ICD-10-CM

## 2024-04-04 PROCEDURE — 99999 PR PBB SHADOW E&M-EST. PATIENT-LVL III: CPT | Mod: PBBFAC,,, | Performed by: STUDENT IN AN ORGANIZED HEALTH CARE EDUCATION/TRAINING PROGRAM

## 2024-04-04 PROCEDURE — 1160F RVW MEDS BY RX/DR IN RCRD: CPT | Mod: CPTII,S$GLB,, | Performed by: STUDENT IN AN ORGANIZED HEALTH CARE EDUCATION/TRAINING PROGRAM

## 2024-04-04 PROCEDURE — 4010F ACE/ARB THERAPY RXD/TAKEN: CPT | Mod: CPTII,S$GLB,, | Performed by: STUDENT IN AN ORGANIZED HEALTH CARE EDUCATION/TRAINING PROGRAM

## 2024-04-04 PROCEDURE — 1159F MED LIST DOCD IN RCRD: CPT | Mod: CPTII,S$GLB,, | Performed by: STUDENT IN AN ORGANIZED HEALTH CARE EDUCATION/TRAINING PROGRAM

## 2024-04-04 PROCEDURE — 99024 POSTOP FOLLOW-UP VISIT: CPT | Mod: S$GLB,,, | Performed by: STUDENT IN AN ORGANIZED HEALTH CARE EDUCATION/TRAINING PROGRAM

## 2024-04-04 NOTE — PROGRESS NOTES
Patient returns today s/p skin biopsy L foot. DOS (1 week ago).    Patient is doing well.    Pain is well controlled.    Pathology returns with simple callused skin without signs of verruca. Suture removed today. Patient to return to normal activities only in supportive shoes and continued local wound care until healed.    F/u as needed.    Demetrio Justice DPM

## 2024-04-25 ENCOUNTER — TELEPHONE (OUTPATIENT)
Dept: DERMATOLOGY | Facility: CLINIC | Age: 54
End: 2024-04-25
Payer: COMMERCIAL

## 2024-04-25 NOTE — TELEPHONE ENCOUNTER
----- Message from Sandra Haskins sent at 4/25/2024 12:05 PM CDT -----  Type:  Patient Returning Call    Who Called:  pt   Who Left Message for Patient:  nurse  Does the patient know what this is regarding?:  yes   Best Call Back Number:  [phone] (home)     Additional Information:  please advise

## 2024-04-25 NOTE — TELEPHONE ENCOUNTER
----- Message from Allegra Zayas sent at 4/25/2024 11:32 AM CDT -----  Contact: patient  Type:  Sooner Apoointment Request    Caller is requesting a sooner appointment.  Caller declined first available appointment listed below.  Caller will not accept being placed on the waitlist and is requesting a message be sent to doctor.    Name of Caller:patient     When is the first available appointment?    Symptoms: skin check     Would the patient rather a call back or a response via [facility]? Call     Best Call Back Number:[phone] (home)      Additional Information:

## 2024-04-25 NOTE — TELEPHONE ENCOUNTER
----- Message from Deng Lee sent at 4/25/2024  1:31 PM CDT -----  Contact: self  Type:  Patient Returning Called  Who Called:  PT  Who Left Message for Patient:  Kyra  Does the patient know what this is regarding?:  Yes  Best Call Back Number:  [phone]    Additional Information:

## 2024-05-15 ENCOUNTER — LAB VISIT (OUTPATIENT)
Dept: LAB | Facility: HOSPITAL | Age: 54
End: 2024-05-15
Attending: STUDENT IN AN ORGANIZED HEALTH CARE EDUCATION/TRAINING PROGRAM
Payer: COMMERCIAL

## 2024-05-15 DIAGNOSIS — Z00.00 ROUTINE GENERAL MEDICAL EXAMINATION AT A HEALTH CARE FACILITY: ICD-10-CM

## 2024-05-15 LAB
ALBUMIN SERPL BCP-MCNC: 3.9 G/DL (ref 3.5–5.2)
ALP SERPL-CCNC: 91 U/L (ref 55–135)
ALT SERPL W/O P-5'-P-CCNC: 25 U/L (ref 10–44)
ANION GAP SERPL CALC-SCNC: 9 MMOL/L (ref 8–16)
AST SERPL-CCNC: 24 U/L (ref 10–40)
BILIRUB SERPL-MCNC: 0.9 MG/DL (ref 0.1–1)
BUN SERPL-MCNC: 15 MG/DL (ref 6–20)
CALCIUM SERPL-MCNC: 9.5 MG/DL (ref 8.7–10.5)
CHLORIDE SERPL-SCNC: 104 MMOL/L (ref 95–110)
CHOLEST SERPL-MCNC: 230 MG/DL (ref 120–199)
CHOLEST/HDLC SERPL: 4.2 {RATIO} (ref 2–5)
CO2 SERPL-SCNC: 27 MMOL/L (ref 23–29)
CREAT SERPL-MCNC: 0.8 MG/DL (ref 0.5–1.4)
ERYTHROCYTE [DISTWIDTH] IN BLOOD BY AUTOMATED COUNT: 12 % (ref 11.5–14.5)
EST. GFR  (NO RACE VARIABLE): >60 ML/MIN/1.73 M^2
ESTIMATED AVG GLUCOSE: 103 MG/DL (ref 68–131)
GLUCOSE SERPL-MCNC: 97 MG/DL (ref 70–110)
HBA1C MFR BLD: 5.2 % (ref 4–5.6)
HCT VFR BLD AUTO: 39.5 % (ref 37–48.5)
HDLC SERPL-MCNC: 55 MG/DL (ref 40–75)
HDLC SERPL: 23.9 % (ref 20–50)
HGB BLD-MCNC: 13.5 G/DL (ref 12–16)
LDLC SERPL CALC-MCNC: 153 MG/DL (ref 63–159)
MCH RBC QN AUTO: 31.3 PG (ref 27–31)
MCHC RBC AUTO-ENTMCNC: 34.2 G/DL (ref 32–36)
MCV RBC AUTO: 91 FL (ref 82–98)
NONHDLC SERPL-MCNC: 175 MG/DL
PLATELET # BLD AUTO: 231 K/UL (ref 150–450)
PMV BLD AUTO: 11.5 FL (ref 9.2–12.9)
POTASSIUM SERPL-SCNC: 4.1 MMOL/L (ref 3.5–5.1)
PROT SERPL-MCNC: 6.8 G/DL (ref 6–8.4)
RBC # BLD AUTO: 4.32 M/UL (ref 4–5.4)
SODIUM SERPL-SCNC: 140 MMOL/L (ref 136–145)
TRIGL SERPL-MCNC: 110 MG/DL (ref 30–150)
WBC # BLD AUTO: 4.47 K/UL (ref 3.9–12.7)

## 2024-05-15 PROCEDURE — 83036 HEMOGLOBIN GLYCOSYLATED A1C: CPT | Performed by: STUDENT IN AN ORGANIZED HEALTH CARE EDUCATION/TRAINING PROGRAM

## 2024-05-15 PROCEDURE — 80061 LIPID PANEL: CPT | Performed by: STUDENT IN AN ORGANIZED HEALTH CARE EDUCATION/TRAINING PROGRAM

## 2024-05-15 PROCEDURE — 36415 COLL VENOUS BLD VENIPUNCTURE: CPT | Mod: PO | Performed by: STUDENT IN AN ORGANIZED HEALTH CARE EDUCATION/TRAINING PROGRAM

## 2024-05-15 PROCEDURE — 85027 COMPLETE CBC AUTOMATED: CPT | Performed by: STUDENT IN AN ORGANIZED HEALTH CARE EDUCATION/TRAINING PROGRAM

## 2024-05-15 PROCEDURE — 80053 COMPREHEN METABOLIC PANEL: CPT | Mod: 91 | Performed by: STUDENT IN AN ORGANIZED HEALTH CARE EDUCATION/TRAINING PROGRAM

## 2024-05-23 ENCOUNTER — TELEPHONE (OUTPATIENT)
Dept: FAMILY MEDICINE | Facility: CLINIC | Age: 54
End: 2024-05-23
Payer: COMMERCIAL

## 2024-05-23 ENCOUNTER — HOSPITAL ENCOUNTER (OUTPATIENT)
Dept: RADIOLOGY | Facility: CLINIC | Age: 54
Discharge: HOME | End: 2024-05-23
Attending: FAMILY MEDICINE
Payer: COMMERCIAL

## 2024-05-23 ENCOUNTER — OFFICE VISIT (OUTPATIENT)
Dept: FAMILY MEDICINE | Facility: CLINIC | Age: 54
End: 2024-05-23
Payer: COMMERCIAL

## 2024-05-23 VITALS
BODY MASS INDEX: 35.08 KG/M2 | SYSTOLIC BLOOD PRESSURE: 118 MMHG | DIASTOLIC BLOOD PRESSURE: 68 MMHG | HEART RATE: 96 BPM | HEIGHT: 64 IN | RESPIRATION RATE: 16 BRPM | TEMPERATURE: 97 F | WEIGHT: 205.5 LBS | OXYGEN SATURATION: 98 %
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
## 2024-05-23 DIAGNOSIS — S60.222A CONTUSION OF LEFT HAND, INITIAL ENCOUNTER: ICD-10-CM

## 2024-05-23 DIAGNOSIS — S93.402A SPRAIN OF LEFT ANKLE, UNSPECIFIED LIGAMENT, INITIAL ENCOUNTER: ICD-10-CM
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
## 2024-05-23 PROCEDURE — 1160F RVW MEDS BY RX/DR IN RCRD: CPT | Mod: CPTII,S$GLB,, | Performed by: FAMILY MEDICINE

## 2024-05-23 PROCEDURE — 3008F BODY MASS INDEX DOCD: CPT | Mod: CPTII,S$GLB,, | Performed by: FAMILY MEDICINE

## 2024-05-23 PROCEDURE — 3078F DIAST BP <80 MM HG: CPT | Mod: CPTII,S$GLB,, | Performed by: FAMILY MEDICINE

## 2024-05-23 PROCEDURE — 73130 X-RAY EXAM OF HAND: CPT | Mod: 26,LT,S$GLB, | Performed by: RADIOLOGY

## 2024-05-23 PROCEDURE — 3074F SYST BP LT 130 MM HG: CPT | Mod: CPTII,S$GLB,, | Performed by: FAMILY MEDICINE

## 2024-05-23 PROCEDURE — 3044F HG A1C LEVEL LT 7.0%: CPT | Mod: CPTII,S$GLB,, | Performed by: FAMILY MEDICINE

## 2024-05-23 PROCEDURE — 73610 X-RAY EXAM OF ANKLE: CPT | Mod: 26,LT,S$GLB, | Performed by: RADIOLOGY

## 2024-05-23 PROCEDURE — 99999 PR PBB SHADOW E&M-EST. PATIENT-LVL IV: CPT | Mod: PBBFAC,,, | Performed by: FAMILY MEDICINE

## 2024-05-23 PROCEDURE — 73130 X-RAY EXAM OF HAND: CPT | Mod: TC,FY,PO,LT

## 2024-05-23 PROCEDURE — 1159F MED LIST DOCD IN RCRD: CPT | Mod: CPTII,S$GLB,, | Performed by: FAMILY MEDICINE

## 2024-05-23 PROCEDURE — 4010F ACE/ARB THERAPY RXD/TAKEN: CPT | Mod: CPTII,S$GLB,, | Performed by: FAMILY MEDICINE

## 2024-05-23 PROCEDURE — 73610 X-RAY EXAM OF ANKLE: CPT | Mod: TC,FY,PO,LT

## 2024-05-23 PROCEDURE — 99213 OFFICE O/P EST LOW 20 MIN: CPT | Mod: S$GLB,,, | Performed by: FAMILY MEDICINE

## 2024-05-23 RX ORDER — TRAMADOL HYDROCHLORIDE 50 MG/1
50 TABLET ORAL EVERY 6 HOURS PRN
Qty: 20 EACH | Refills: 0 | Status: SHIPPED | OUTPATIENT
Start: 2024-05-23

## 2024-05-23 NOTE — TELEPHONE ENCOUNTER
Spoke with patient   She will come in as early as she can    Her appt was rescheduled to 3:00 with Dr Nolasco      ----- Message from Silvia You sent at 5/23/2024  8:25 AM CDT -----  Type: Needs Medical Advice  Who Called:  pt  Symptoms (please be specific):  pt said she need to speak to the office--said she need to know about what she was told about her appt today--said she was spoke to very rude and she need someone other than Keyanna--please call and advise  Best Call Back Number: [phone] (home)     Additional Information: thank you

## 2024-05-23 NOTE — PATIENT INSTRUCTIONS
Rest the areas- use the boot.      Warm compresses to both areas twice a day.    Further advice pending results of Xray.    Contact me or your PCP if any worsening or for any new concerns as we discussed.

## 2024-05-23 NOTE — PROGRESS NOTES
"-- DO NOT REPLY / DO NOT REPLY ALL --  -- Message is from the [facility]--      Patient is requesting a medication refill - medication is on active list    Was Medication Pended? Yes. Rx Name and Dose:  clonazePAM (KlonoPIN) 0.5 MG tablet. Patient would like a callback from office regarding this refill. Patient stated if doctor does not respond he will go to office for written prescription for 90 day supply. Duration: 90 days    [facility] [address] [facility]    Patient confirmed the above pharmacy as correct? Yes    Does this request need an existing or new prescription at a pharmacy to be sent to a new pharmacy location? No    Caller Information       Type Contact Phone    12/16/2021 01:26 PM CST Phone (Incoming) Henrique Taylor (Self) [phone] (M)          Alternative phone number: None     Turnaround time given to caller: ""This message will be sent to [facility] Provider's name]. The clinical team will fulfill your request as soon as they review your message. \""  " Subjective:       Patient ID: Sondra Beltran is a 54 y.o. female.    Chief Complaint: Hand Pain and Ankle Pain    New to me patient here for UC visit.  Fall 2 weeks ago - was yanked by two dogs on lease from doorway out over 5 steps into her yard.  Pain and swelling still at left hand/MC area - can be severe with use.  Goor ROM and feeling in fingers. Left ankle also is swollen and painful.  Has a walking boot at home; has not use it.      Hand Pain   Pertinent negatives include no chest pain.   Ankle Pain     Review of Systems   Constitutional:  Negative for fever.   Respiratory:  Negative for shortness of breath.    Cardiovascular:  Negative for chest pain.   Gastrointestinal:  Negative for abdominal pain and nausea.   Skin:  Negative for rash.   All other systems reviewed and are negative.      Objective:      Physical Exam  Vitals reviewed.   Constitutional:       General: She is not in acute distress.     Appearance: She is well-developed.   Cardiovascular:      Rate and Rhythm: Normal rate and regular rhythm.      Heart sounds: No murmur heard.  Pulmonary:      Effort: Pulmonary effort is normal.      Breath sounds: Normal breath sounds.   Musculoskeletal:      Left hand: Swelling and tenderness present. Decreased strength (2T pain). Normal sensation. Normal capillary refill. Normal pulse.      Left ankle: Swelling present. Tenderness present. Normal pulse.      Left Achilles Tendon: Normal.      Comments: TTP ant ant-lat area and ant-medial area   Neurological:      Mental Status: She is alert.         Assessment:       1. Fall, initial encounter    2. Contusion of left hand, initial encounter    3. Sprain of left ankle, unspecified ligament, initial encounter        Plan:       Fall, initial encounter  -     X-Ray Ankle Complete Left; Future; Expected date: 05/23/2024  -     X-Ray Hand Complete Left; Future; Expected date: 05/23/2024  -     traMADoL (ULTRAM) 50 mg tablet; Take 1 tablet (50 mg total)  by mouth every 6 (six) hours as needed for Pain.  Dispense: 20 each; Refill: 0    Contusion of left hand, initial encounter  -     X-Ray Hand Complete Left; Future; Expected date: 05/23/2024  -     traMADoL (ULTRAM) 50 mg tablet; Take 1 tablet (50 mg total) by mouth every 6 (six) hours as needed for Pain.  Dispense: 20 each; Refill: 0    Sprain of left ankle, unspecified ligament, initial encounter  -     X-Ray Ankle Complete Left; Future; Expected date: 05/23/2024  -     traMADoL (ULTRAM) 50 mg tablet; Take 1 tablet (50 mg total) by mouth every 6 (six) hours as needed for Pain.  Dispense: 20 each; Refill: 0      Patient Instructions   Rest the areas- use the boot.      Warm compresses to both areas twice a day.    Further advice pending results of Xray.    Contact me or your PCP if any worsening or for any new concerns as we discussed.

## 2024-05-23 NOTE — TELEPHONE ENCOUNTER
"Phoned patient to request her to come for OV earlier as provider is not in the office Friday nor Monday to interpret any lab/imaging that may be ordered r/t fall et injury to hand, foot as described. Patient states she is on her way to another Dr. Appt & then had to pick dogs up from [facility], take them back to [place] & drive back to [place] for scheduled OV. States she would TRY to make it for 1430 but couldn't make any promises. Advised patient that another option would be to seek care at [facility] in [place] to alleviate the distance/drive time & allow her to be seen sooner. Patient became very upset exclaiming that this nurse was trying to pass her off to an urgent care when she wanted to be seen at her Dr.'s office. Explained to patient that Dr. Nolasco is an urgent care MD but that due to his schedule & her late appointment for the day, her results may end up w/the on-call MD-not addressed by Dr. Nolasco. Patient screaming in phone that she does not like this nurse "attitude" & to cancel her appt. This nurse then asked if patient would like to be returned to GO Salcedo NP schedule as she was originally planned to see & who could follow up w/patient? Patient declined & again exclaims to cancel her appointment. Appt canceled as requested.   "

## 2024-05-24 ENCOUNTER — PATIENT MESSAGE (OUTPATIENT)
Dept: DERMATOLOGY | Facility: CLINIC | Age: 54
End: 2024-05-24
Payer: COMMERCIAL

## 2024-06-04 ENCOUNTER — OFFICE VISIT (OUTPATIENT)
Dept: DERMATOLOGY | Facility: CLINIC | Age: 54
End: 2024-06-04
Payer: COMMERCIAL

## 2024-06-04 VITALS — WEIGHT: 205.5 LBS | BODY MASS INDEX: 35.08 KG/M2 | HEIGHT: 64 IN

## 2024-06-04 DIAGNOSIS — L81.4 SOLAR LENTIGO: ICD-10-CM

## 2024-06-04 DIAGNOSIS — L82.1 SEBORRHEIC KERATOSES: ICD-10-CM

## 2024-06-04 DIAGNOSIS — D22.9 MULTIPLE BENIGN NEVI: Primary | ICD-10-CM

## 2024-06-04 DIAGNOSIS — L30.4 INTERTRIGO: ICD-10-CM

## 2024-06-04 DIAGNOSIS — H02.60 XANTHELASMA: ICD-10-CM

## 2024-06-04 DIAGNOSIS — Z86.018 HISTORY OF DYSPLASTIC NEVUS: ICD-10-CM

## 2024-06-04 PROCEDURE — 1160F RVW MEDS BY RX/DR IN RCRD: CPT | Mod: CPTII,S$GLB,, | Performed by: DERMATOLOGY

## 2024-06-04 PROCEDURE — 99204 OFFICE O/P NEW MOD 45 MIN: CPT | Mod: S$GLB,,, | Performed by: DERMATOLOGY

## 2024-06-04 PROCEDURE — 3044F HG A1C LEVEL LT 7.0%: CPT | Mod: CPTII,S$GLB,, | Performed by: DERMATOLOGY

## 2024-06-04 PROCEDURE — 4010F ACE/ARB THERAPY RXD/TAKEN: CPT | Mod: CPTII,S$GLB,, | Performed by: DERMATOLOGY

## 2024-06-04 PROCEDURE — 1159F MED LIST DOCD IN RCRD: CPT | Mod: CPTII,S$GLB,, | Performed by: DERMATOLOGY

## 2024-06-04 PROCEDURE — 3008F BODY MASS INDEX DOCD: CPT | Mod: CPTII,S$GLB,, | Performed by: DERMATOLOGY

## 2024-06-04 RX ORDER — KETOCONAZOLE 20 MG/G
CREAM TOPICAL
Qty: 60 G | Refills: 3 | Status: SHIPPED | OUTPATIENT
Start: 2024-06-04

## 2024-06-04 NOTE — PROGRESS NOTES
"  Subjective:      Patient ID:  Sondra Beltran is a 54 y.o. female who presents for   Chief Complaint   Patient presents with    Skin Check     UBSE     LOV: 7/18/18 ([facility]ahul) NUB, skin tag, nevi, SK    FINAL PATHOLOGIC DIAGNOSIS  1. 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  - MARGINS ARE NEGATIVE IN THE PLANES OF SECTION.  MICROSCOPIC DESCRIPTION: Sections show a circumscribed, symmetric compound nevus characterized by  nests of melanocytes with scattered single cells located predominantly along the dermoepidermal junction. There is  architectural disorder consisting of irregularity in the size, shape and distribution of the nests at the  dermal-epidermal junction and "bridging" between rete ridges. There is no significant cytologic atypia or pagetoid  growth. Nests and cords of melanocytes are present within the papillary dermis and show maturation with dermal  descent. Papillary dermal fibrosis, a mild lymphocytic infiltrate and melanophages are noted.    Patient here for UBSE    Derm Hx:  Denies phx NMSC/MM  Denies fhx MM    Current Outpatient Medications:   ·  albuterol (ACCUNEB) 1.25 mg/3 mL Nebu, Inhale 6 mL 3 times a day by inhalation route., Disp: , Rfl:   ·  albuterol sulfate (INV ALBUTEROL) 90 mcg inhalation, Inhale into the lungs as needed. Take one puff by mouth as directed by Physician. Investigational Medication. Patient Study#    ., Disp: , Rfl:   ·  ascorbic acid, vitamin C, (VITAMIN C) 500 MG tablet, Take 500 mg by mouth 3 (three) times daily., Disp: , Rfl:   ·  azelastine (ASTELIN) 137 mcg (0.1 %) nasal spray, SMARTSIG:Both Nares Strength: 137 mcg (0.1 %), Disp: 30 mL, Rfl: 3  ·  budesonide-formoterol 160-4.5 mcg (SYMBICORT) 160-4.5 mcg/actuation HFAA, Inhale 2 puffs into the lungs every 12 (twelve) hours. Controller, Disp: 6 g, Rfl: 3  ·  fluticasone propionate (FLONASE) 50 " mcg/actuation nasal spray, 2 sprays (100 mcg total) by Each Nostril route 2 (two) times daily., Disp: 9.9 g, Rfl: 3  ·  levocetirizine (XYZAL) 5 MG tablet, Take 5 mg by mouth every evening., Disp: , Rfl:   ·  montelukast (SINGULAIR) 10 mg tablet, Take 10 mg by mouth., Disp: , Rfl:   ·  nystatin (MYCOSTATIN) powder, Apply topically 2 (two) times daily., Disp: 15 g, Rfl: 0  ·  olmesartan (BENICAR) 5 MG Tab, Take 1 tablet by mouth once daily, Disp: 90 tablet, Rfl: 0  ·  selenium 50 mcg Tab, Take 200 mcg by mouth once daily., Disp: , Rfl:   ·  tiotropium bromide (SPIRIVA RESPIMAT) 1.25 mcg/actuation inhaler, Inhale 2 puffs into the lungs once daily. Controller, Disp: 4 g, Rfl: 3  ·  traMADoL (ULTRAM) 50 mg tablet, Take 1 tablet (50 mg total) by mouth every 6 (six) hours as needed for Pain., Disp: 20 each, Rfl: 0  ·  valACYclovir (VALTREX) 1000 MG tablet, Take 2 tablets (2,000 mg total) by mouth every 12 (twelve) hours., Disp: 4 tablet, Rfl: 2          Review of Systems   Constitutional:  Negative for fever, chills and fatigue.   Skin:  Positive for activity-related sunscreen use and wears hat (sometimes). Negative for itching, rash, dry skin and daily sunscreen use.   Hematologic/Lymphatic: Bruises/bleeds easily.       Objective:   Physical Exam   Constitutional: She appears well-developed and well-nourished. No distress.   HENT:   Mouth/Throat: Lips normal.    Eyes: Lids are normal.  No conjunctival no injection.   Cardiovascular:  There is no local extremity swelling and no dependent edema.             Neurological: She is alert and oriented to person, place, and time. She is not disoriented.   Psychiatric: She has a normal mood and affect.   Skin:   Areas Examined (abnormalities noted in diagram):   Head / Face Inspection Performed  Neck Inspection Performed  Chest / Axilla Inspection Performed  Abdomen Inspection Performed  Back Inspection Performed  RUE Inspected  LUE Inspection Performed  RLE Inspected  LLE  Inspection Performed                 Diagram Legend     Erythematous scaling macule/papule c/w actinic keratosis       Vascular papule c/w angioma      Pigmented verrucoid papule/plaque c/w seborrheic keratosis      Yellow umbilicated papule c/w sebaceous hyperplasia      Irregularly shaped tan macule c/w lentigo     1-2 mm smooth white papules consistent with Milia      Movable subcutaneous cyst with punctum c/w epidermal inclusion cyst      Subcutaneous movable cyst c/w pilar cyst      Firm pink to brown papule c/w dermatofibroma      Pedunculated fleshy papule(s) c/w skin tag(s)      Evenly pigmented macule c/w junctional nevus     Mildly variegated pigmented, slightly irregular-bordered macule c/w mildly atypical nevus      Flesh colored to evenly pigmented papule c/w intradermal nevus       Pink pearly papule/plaque c/w basal cell carcinoma      Erythematous hyperkeratotic cursted plaque c/w SCC      Surgical scar with no sign of skin cancer recurrence      Open and closed comedones      Inflammatory papules and pustules      Verrucoid papule consistent consistent with wart     Erythematous eczematous patches and plaques     Dystrophic onycholytic nail with subungual debris c/w onychomycosis     Umbilicated papule    Erythematous-base heme-crusted tan verrucoid plaque consistent with inflamed seborrheic keratosis     Erythematous Silvery Scaling Plaque c/w Psoriasis     See annotation      Assessment / Plan:        Multiple benign nevi  Careful dermoscopy evaluation of nevi performed with none identified as needing biopsy today  Monitor for new mole or moles that are becoming bigger, darker, irritated, or developing irregular borders.     History of dysplastic nevus  Area of previous DN (back) examined. Site well healed with no signs of recurrence.    Total body skin examination performed today including at least 12 points as noted in physical examination. No lesions suspicious for malignancy  noted.    Seborrheic keratoses  These are benign inherited growths without a malignant potential. Reassurance given to patient. No treatment is necessary.     Solar lentigo  This is a benign hyperpigmented sun induced lesion. Daily sun protection will reduce the number of new lesions. Treatment of these benign lesions are considered cosmetic.    Xanthelasma  B medial upper lids  PCP monitoring lipidemia, mild elevation of TC noted  cosmetic issue, difficult to treat    Intertrigo, infra mammary  -     ketoconazole (NIZORAL) 2 % cream; AAA bid  Dispense: 60 g; Refill: 3  Cool blow dry after showering. Once clear, use baby powder, corn starch, or Zeasorb AF powder for maintenance to affected area.            No follow-ups on file.

## 2024-07-06 ENCOUNTER — OFFICE VISIT (OUTPATIENT)
Dept: URGENT CARE | Facility: CLINIC | Age: 54
End: 2024-07-06
Payer: COMMERCIAL

## 2024-07-06 VITALS
HEIGHT: 64 IN | OXYGEN SATURATION: 97 % | TEMPERATURE: 99 F | BODY MASS INDEX: 35 KG/M2 | HEART RATE: 84 BPM | WEIGHT: 205 LBS | RESPIRATION RATE: 18 BRPM | SYSTOLIC BLOOD PRESSURE: 150 MMHG | DIASTOLIC BLOOD PRESSURE: 78 MMHG

## 2024-07-06 DIAGNOSIS — J06.9 UPPER RESPIRATORY TRACT INFECTION, UNSPECIFIED TYPE: Primary | ICD-10-CM

## 2024-07-06 DIAGNOSIS — J40 BRONCHITIS: ICD-10-CM

## 2024-07-06 DIAGNOSIS — J45.901 EXACERBATION OF ASTHMA, UNSPECIFIED ASTHMA SEVERITY, UNSPECIFIED WHETHER PERSISTENT: ICD-10-CM

## 2024-07-06 LAB
CTP QC/QA: YES
FLUAV AG NPH QL: NEGATIVE
FLUBV AG NPH QL: NEGATIVE
S PYO RRNA THROAT QL PROBE: NEGATIVE
SARS-COV-2 AG RESP QL IA.RAPID: NEGATIVE

## 2024-07-06 PROCEDURE — 87811 SARS-COV-2 COVID19 W/OPTIC: CPT | Mod: QW,S$GLB,, | Performed by: STUDENT IN AN ORGANIZED HEALTH CARE EDUCATION/TRAINING PROGRAM

## 2024-07-06 PROCEDURE — 87804 INFLUENZA ASSAY W/OPTIC: CPT | Mod: QW,,, | Performed by: STUDENT IN AN ORGANIZED HEALTH CARE EDUCATION/TRAINING PROGRAM

## 2024-07-06 PROCEDURE — 99214 OFFICE O/P EST MOD 30 MIN: CPT | Mod: S$GLB,,, | Performed by: STUDENT IN AN ORGANIZED HEALTH CARE EDUCATION/TRAINING PROGRAM

## 2024-07-06 PROCEDURE — 87880 STREP A ASSAY W/OPTIC: CPT | Mod: QW,,, | Performed by: STUDENT IN AN ORGANIZED HEALTH CARE EDUCATION/TRAINING PROGRAM

## 2024-07-06 RX ORDER — GUAIFENESIN 600 MG/1
1200 TABLET, EXTENDED RELEASE ORAL 2 TIMES DAILY
Qty: 30 TABLET | Refills: 0 | Status: SHIPPED | OUTPATIENT
Start: 2024-07-06

## 2024-07-06 RX ORDER — AZITHROMYCIN 250 MG/1
TABLET, FILM COATED ORAL
Qty: 6 TABLET | Refills: 0 | Status: ON HOLD | OUTPATIENT
Start: 2024-07-06 | End: 2024-07-10 | Stop reason: HOSPADM

## 2024-07-06 RX ORDER — PROMETHAZINE HYDROCHLORIDE AND DEXTROMETHORPHAN HYDROBROMIDE 6.25; 15 MG/5ML; MG/5ML
SYRUP ORAL
COMMUNITY
Start: 2024-01-12

## 2024-07-06 RX ORDER — PROMETHAZINE HYDROCHLORIDE AND DEXTROMETHORPHAN HYDROBROMIDE 6.25; 15 MG/5ML; MG/5ML
5 SYRUP ORAL EVERY 4 HOURS PRN
Qty: 118 ML | Refills: 0 | Status: SHIPPED | OUTPATIENT
Start: 2024-07-06 | End: 2024-07-16

## 2024-07-06 RX ORDER — LEVOCETIRIZINE DIHYDROCHLORIDE 5 MG/1
5 TABLET, FILM COATED ORAL NIGHTLY
Qty: 30 TABLET | Refills: 0 | Status: SHIPPED | OUTPATIENT
Start: 2024-07-06 | End: 2025-07-06

## 2024-07-06 RX ORDER — PREDNISONE 20 MG/1
40 TABLET ORAL DAILY
Qty: 10 TABLET | Refills: 0 | Status: ON HOLD | OUTPATIENT
Start: 2024-07-06 | End: 2024-07-10

## 2024-07-06 RX ORDER — CEFDINIR 300 MG/1
600 CAPSULE ORAL
COMMUNITY
Start: 2024-06-24 | End: 2024-07-08

## 2024-07-06 NOTE — PATIENT INSTRUCTIONS
Thankyou for the opportunity to care for you today.  Please take all medications as directed, continue any previous prescribed medications unless we specifically discussed holding them.  If your symptoms do not resolve or worsen please return to the clinic for re-evaluation, if your situation becomes emergent please present to to the nearest emergency department.  Follow-up with your PCP for continued evaluation and management.    Use you nebs as we discussed

## 2024-07-06 NOTE — PROGRESS NOTES
"Subjective:      Patient ID: Sondra Beltran is a 54 y.o. female.    Vitals:  height is 5' 4" (1.626 m) and weight is 93 kg (205 lb). Her oral temperature is 98.8 °F (37.1 °C). Her blood pressure is 150/78 (abnormal) and her pulse is 84. Her respiration is 18 and oxygen saturation is 97%.     Chief Complaint: URI    Ambulatory to room with complaint of cough and congestion times several days.  Denies subjective fevers.  Admits to a history of asthma.    URI   This is a new problem. The current episode started in the past 7 days. The problem has been gradually worsening. There has been no fever. The cough is Non-productive. Associated symptoms include congestion, coughing, ear pain and sinus pain.       Constitution: Negative for fever.   HENT:  Positive for ear pain, congestion and sinus pain.    Respiratory:  Positive for cough.       Objective:     Physical Exam   Constitutional: She is oriented to person, place, and time. She appears well-developed. She is cooperative.  Non-toxic appearance. She does not appear ill. No distress.   HENT:   Head: Normocephalic and atraumatic.   Ears:   Right Ear: Hearing, tympanic membrane, external ear and ear canal normal.   Left Ear: Hearing, tympanic membrane, external ear and ear canal normal.   Nose: Nose normal. No mucosal edema, rhinorrhea or nasal deformity. No epistaxis. Right sinus exhibits no maxillary sinus tenderness and no frontal sinus tenderness. Left sinus exhibits no maxillary sinus tenderness and no frontal sinus tenderness.   Mouth/Throat: Uvula is midline, oropharynx is clear and moist and mucous membranes are normal. No trismus in the jaw. Normal dentition. No uvula swelling. No oropharyngeal exudate, posterior oropharyngeal edema or posterior oropharyngeal erythema.   Eyes: Conjunctivae and lids are normal. No scleral icterus.   Neck: Trachea normal and phonation normal. Neck supple. No edema present. No erythema present. No neck rigidity present. "   Cardiovascular: Normal rate, regular rhythm, normal heart sounds and normal pulses.   Pulmonary/Chest: Effort normal. No respiratory distress. She has no decreased breath sounds. She has no wheezes. She has rhonchi. She has no rales.         Comments: Reduced aeration, rhonchorous respirations.    Abdominal: Normal appearance.   Musculoskeletal: Normal range of motion.         General: No deformity. Normal range of motion.   Neurological: She is alert and oriented to person, place, and time. She exhibits normal muscle tone. Coordination normal.   Skin: Skin is warm, dry, intact, not diaphoretic and not pale.   Psychiatric: Her speech is normal and behavior is normal. Judgment and thought content normal.   Nursing note and vitals reviewed.      Assessment:     1. Upper respiratory tract infection, unspecified type    2. Bronchitis    3. Exacerbation of asthma, unspecified asthma severity, unspecified whether persistent        Plan:       Upper respiratory tract infection, unspecified type  -     SARS Coronavirus 2 Antigen, POCT Manual Read  -     POCT rapid strep A  -     POCT Influenza A/B Rapid Antigen    Bronchitis    Exacerbation of asthma, unspecified asthma severity, unspecified whether persistent    Other orders  -     promethazine-dextromethorphan (PROMETHAZINE-DM) 6.25-15 mg/5 mL Syrp; Take 5 mLs by mouth every 4 (four) hours as needed.  Dispense: 118 mL; Refill: 0  -     guaiFENesin (MUCINEX) 600 mg 12 hr tablet; Take 2 tablets (1,200 mg total) by mouth 2 (two) times daily.  Dispense: 30 tablet; Refill: 0  -     levocetirizine (XYZAL) 5 MG tablet; Take 1 tablet (5 mg total) by mouth every evening.  Dispense: 30 tablet; Refill: 0  -     predniSONE (DELTASONE) 20 MG tablet; Take 2 tablets (40 mg total) by mouth once daily. for 5 days  Dispense: 10 tablet; Refill: 0  -     azithromycin (Z-KEO) 250 MG tablet; Take 2 tablets by mouth on day 1; Take 1 tablet by mouth on days 2-5  Dispense: 6 tablet; Refill:  0      COVID flu and strep negative in clinic.     Admits to using her rescue inhaler multiple times a week within the last week or so, has DuoNebs at home, instructed to use DuoNebs q.4 to 6 hours for a few days.  - To ED for any new or acutely worsening symptoms including but not limited to chest pain, palpitations, shortness of breath, or fever greater than 103° F.  Patient in agreement with plan of care.                - The diagnosis, treatment plan, instructions for follow-up and reevaluation as well as ED precautions were discussed and understanding was verbalized. All questions or concerns have been addressed.

## 2024-07-08 ENCOUNTER — OFFICE VISIT (OUTPATIENT)
Dept: FAMILY MEDICINE | Facility: CLINIC | Age: 54
End: 2024-07-08
Payer: COMMERCIAL

## 2024-07-08 ENCOUNTER — HOSPITAL ENCOUNTER (OUTPATIENT)
Facility: HOSPITAL | Age: 54
Discharge: HOME | End: 2024-07-10
Attending: EMERGENCY MEDICINE | Admitting: HOSPITALIST
Payer: COMMERCIAL

## 2024-07-08 VITALS
TEMPERATURE: 98 F | OXYGEN SATURATION: 97 % | WEIGHT: 205 LBS | DIASTOLIC BLOOD PRESSURE: 80 MMHG | BODY MASS INDEX: 35 KG/M2 | HEIGHT: 64 IN | SYSTOLIC BLOOD PRESSURE: 140 MMHG | HEART RATE: 108 BPM

## 2024-07-08 DIAGNOSIS — J45.51 SEVERE PERSISTENT ASTHMA WITH (ACUTE) EXACERBATION: Primary | ICD-10-CM

## 2024-07-08 DIAGNOSIS — J45.41 MODERATE PERSISTENT ASTHMA WITH EXACERBATION: Primary | ICD-10-CM

## 2024-07-08 DIAGNOSIS — I10 BENIGN ESSENTIAL HTN: ICD-10-CM

## 2024-07-08 PROBLEM — J96.01 ACUTE HYPOXEMIC RESPIRATORY FAILURE: Status: ACTIVE | Noted: 2024-07-08

## 2024-07-08 LAB
ALBUMIN SERPL BCP-MCNC: 3.7 G/DL (ref 3.5–5.2)
ALP SERPL-CCNC: 86 U/L (ref 55–135)
ALT SERPL W/O P-5'-P-CCNC: 28 U/L (ref 10–44)
ANION GAP SERPL CALC-SCNC: 13 MMOL/L (ref 8–16)
AST SERPL-CCNC: 20 U/L (ref 10–40)
BASOPHILS # BLD AUTO: 0.01 K/UL (ref 0–0.2)
BASOPHILS NFR BLD: 0.2 % (ref 0–1.9)
BILIRUB SERPL-MCNC: 0.4 MG/DL (ref 0.1–1)
BUN SERPL-MCNC: 15 MG/DL (ref 6–20)
CALCIUM SERPL-MCNC: 9.6 MG/DL (ref 8.7–10.5)
CHLORIDE SERPL-SCNC: 108 MMOL/L (ref 95–110)
CO2 SERPL-SCNC: 19 MMOL/L (ref 23–29)
CREAT SERPL-MCNC: 0.8 MG/DL (ref 0.5–1.4)
DIFFERENTIAL METHOD BLD: ABNORMAL
EOSINOPHIL # BLD AUTO: 0 K/UL (ref 0–0.5)
EOSINOPHIL NFR BLD: 0 % (ref 0–8)
ERYTHROCYTE [DISTWIDTH] IN BLOOD BY AUTOMATED COUNT: 12.4 % (ref 11.5–14.5)
EST. GFR  (NO RACE VARIABLE): >60 ML/MIN/1.73 M^2
GLUCOSE SERPL-MCNC: 203 MG/DL (ref 70–110)
HCT VFR BLD AUTO: 41.4 % (ref 37–48.5)
HGB BLD-MCNC: 13.4 G/DL (ref 12–16)
IMM GRANULOCYTES # BLD AUTO: 0.04 K/UL (ref 0–0.04)
IMM GRANULOCYTES NFR BLD AUTO: 0.6 % (ref 0–0.5)
LYMPHOCYTES # BLD AUTO: 0.8 K/UL (ref 1–4.8)
LYMPHOCYTES NFR BLD: 12.4 % (ref 18–48)
MCH RBC QN AUTO: 29.7 PG (ref 27–31)
MCHC RBC AUTO-ENTMCNC: 32.4 G/DL (ref 32–36)
MCV RBC AUTO: 92 FL (ref 82–98)
MONOCYTES # BLD AUTO: 0.1 K/UL (ref 0.3–1)
MONOCYTES NFR BLD: 2.1 % (ref 4–15)
NEUTROPHILS # BLD AUTO: 5.5 K/UL (ref 1.8–7.7)
NEUTROPHILS NFR BLD: 84.7 % (ref 38–73)
NRBC BLD-RTO: 0 /100 WBC
PLATELET # BLD AUTO: 185 K/UL (ref 150–450)
PMV BLD AUTO: 11.7 FL (ref 9.2–12.9)
POTASSIUM SERPL-SCNC: 4.2 MMOL/L (ref 3.5–5.1)
PROT SERPL-MCNC: 6.9 G/DL (ref 6–8.4)
RBC # BLD AUTO: 4.51 M/UL (ref 4–5.4)
SARS-COV-2 RDRP RESP QL NAA+PROBE: NEGATIVE
SODIUM SERPL-SCNC: 140 MMOL/L (ref 136–145)
WBC # BLD AUTO: 6.53 K/UL (ref 3.9–12.7)

## 2024-07-08 PROCEDURE — G0378 HOSPITAL OBSERVATION PER HR: HCPCS

## 2024-07-08 PROCEDURE — 25000242 PHARM REV CODE 250 ALT 637 W/ HCPCS: Performed by: EMERGENCY MEDICINE

## 2024-07-08 PROCEDURE — U0002 COVID-19 LAB TEST NON-CDC: HCPCS | Performed by: EMERGENCY MEDICINE

## 2024-07-08 PROCEDURE — 27000221 HC OXYGEN, UP TO 24 HOURS

## 2024-07-08 PROCEDURE — 99285 EMERGENCY DEPT VISIT HI MDM: CPT | Mod: 25

## 2024-07-08 PROCEDURE — 63600175 PHARM REV CODE 636 W HCPCS: Performed by: NURSE PRACTITIONER

## 2024-07-08 PROCEDURE — 96366 THER/PROPH/DIAG IV INF ADDON: CPT

## 2024-07-08 PROCEDURE — 94640 AIRWAY INHALATION TREATMENT: CPT | Mod: XB

## 2024-07-08 PROCEDURE — 1160F RVW MEDS BY RX/DR IN RCRD: CPT | Mod: CPTII,S$GLB,,

## 2024-07-08 PROCEDURE — 25000242 PHARM REV CODE 250 ALT 637 W/ HCPCS: Performed by: NURSE PRACTITIONER

## 2024-07-08 PROCEDURE — 99999 PR PBB SHADOW E&M-EST. PATIENT-LVL IV: CPT | Mod: PBBFAC,,,

## 2024-07-08 PROCEDURE — 4010F ACE/ARB THERAPY RXD/TAKEN: CPT | Mod: CPTII,S$GLB,,

## 2024-07-08 PROCEDURE — 94799 UNLISTED PULMONARY SVC/PX: CPT

## 2024-07-08 PROCEDURE — 3077F SYST BP >= 140 MM HG: CPT | Mod: CPTII,S$GLB,,

## 2024-07-08 PROCEDURE — 36415 COLL VENOUS BLD VENIPUNCTURE: CPT | Performed by: EMERGENCY MEDICINE

## 2024-07-08 PROCEDURE — 63600175 PHARM REV CODE 636 W HCPCS

## 2024-07-08 PROCEDURE — 85025 COMPLETE CBC W/AUTO DIFF WBC: CPT | Performed by: EMERGENCY MEDICINE

## 2024-07-08 PROCEDURE — 3008F BODY MASS INDEX DOCD: CPT | Mod: CPTII,S$GLB,,

## 2024-07-08 PROCEDURE — 3079F DIAST BP 80-89 MM HG: CPT | Mod: CPTII,S$GLB,,

## 2024-07-08 PROCEDURE — 94760 N-INVAS EAR/PLS OXIMETRY 1: CPT

## 2024-07-08 PROCEDURE — 80053 COMPREHEN METABOLIC PANEL: CPT | Performed by: EMERGENCY MEDICINE

## 2024-07-08 PROCEDURE — 63600175 PHARM REV CODE 636 W HCPCS: Performed by: EMERGENCY MEDICINE

## 2024-07-08 PROCEDURE — 99900035 HC TECH TIME PER 15 MIN (STAT)

## 2024-07-08 PROCEDURE — 99214 OFFICE O/P EST MOD 30 MIN: CPT | Mod: S$GLB,,,

## 2024-07-08 PROCEDURE — 25000003 PHARM REV CODE 250: Performed by: NURSE PRACTITIONER

## 2024-07-08 PROCEDURE — 1159F MED LIST DOCD IN RCRD: CPT | Mod: CPTII,S$GLB,,

## 2024-07-08 PROCEDURE — 3044F HG A1C LEVEL LT 7.0%: CPT | Mod: CPTII,S$GLB,,

## 2024-07-08 PROCEDURE — 96375 TX/PRO/DX INJ NEW DRUG ADDON: CPT

## 2024-07-08 PROCEDURE — 96361 HYDRATE IV INFUSION ADD-ON: CPT

## 2024-07-08 PROCEDURE — 96365 THER/PROPH/DIAG IV INF INIT: CPT

## 2024-07-08 PROCEDURE — 96376 TX/PRO/DX INJ SAME DRUG ADON: CPT

## 2024-07-08 PROCEDURE — 25000003 PHARM REV CODE 250: Performed by: EMERGENCY MEDICINE

## 2024-07-08 RX ORDER — SODIUM CHLORIDE 9 MG/ML
INJECTION, SOLUTION INTRAVENOUS ONCE
Status: COMPLETED | OUTPATIENT
Start: 2024-07-08 | End: 2024-07-09

## 2024-07-08 RX ORDER — ONDANSETRON HYDROCHLORIDE 2 MG/ML
4 INJECTION, SOLUTION INTRAVENOUS EVERY 6 HOURS PRN
Status: DISCONTINUED | OUTPATIENT
Start: 2024-07-08 | End: 2024-07-10 | Stop reason: HOSPADM

## 2024-07-08 RX ORDER — ARFORMOTEROL TARTRATE 15 UG/2ML
15 SOLUTION RESPIRATORY (INHALATION) 2 TIMES DAILY
Status: DISCONTINUED | OUTPATIENT
Start: 2024-07-08 | End: 2024-07-10 | Stop reason: HOSPADM

## 2024-07-08 RX ORDER — IPRATROPIUM BROMIDE AND ALBUTEROL SULFATE 2.5; .5 MG/3ML; MG/3ML
3 SOLUTION RESPIRATORY (INHALATION)
Status: COMPLETED | OUTPATIENT
Start: 2024-07-08 | End: 2024-07-08

## 2024-07-08 RX ORDER — FLUTICASONE FUROATE AND VILANTEROL 100; 25 UG/1; UG/1
1 POWDER RESPIRATORY (INHALATION) DAILY
Status: DISCONTINUED | OUTPATIENT
Start: 2024-07-09 | End: 2024-07-08 | Stop reason: SDUPTHER

## 2024-07-08 RX ORDER — IPRATROPIUM BROMIDE AND ALBUTEROL SULFATE 2.5; .5 MG/3ML; MG/3ML
3 SOLUTION RESPIRATORY (INHALATION)
Status: DISCONTINUED | OUTPATIENT
Start: 2024-07-08 | End: 2024-07-09

## 2024-07-08 RX ORDER — MONTELUKAST SODIUM 10 MG/1
10 TABLET ORAL DAILY
Status: DISCONTINUED | OUTPATIENT
Start: 2024-07-09 | End: 2024-07-10 | Stop reason: HOSPADM

## 2024-07-08 RX ORDER — BENZONATATE 100 MG/1
100 CAPSULE ORAL
Status: COMPLETED | OUTPATIENT
Start: 2024-07-08 | End: 2024-07-08

## 2024-07-08 RX ORDER — OXYCODONE HYDROCHLORIDE 5 MG/1
5 TABLET ORAL EVERY 6 HOURS PRN
Status: DISCONTINUED | OUTPATIENT
Start: 2024-07-08 | End: 2024-07-10 | Stop reason: HOSPADM

## 2024-07-08 RX ORDER — ACETAMINOPHEN 325 MG/1
650 TABLET ORAL EVERY 8 HOURS PRN
Status: DISCONTINUED | OUTPATIENT
Start: 2024-07-08 | End: 2024-07-10 | Stop reason: HOSPADM

## 2024-07-08 RX ORDER — TALC
6 POWDER (GRAM) TOPICAL NIGHTLY PRN
Status: DISCONTINUED | OUTPATIENT
Start: 2024-07-08 | End: 2024-07-10 | Stop reason: HOSPADM

## 2024-07-08 RX ORDER — METHYLPREDNISOLONE SOD SUCC 125 MG
125 VIAL (EA) INJECTION
Status: COMPLETED | OUTPATIENT
Start: 2024-07-08 | End: 2024-07-08

## 2024-07-08 RX ORDER — IBUPROFEN 200 MG
24 TABLET ORAL
Status: DISCONTINUED | OUTPATIENT
Start: 2024-07-08 | End: 2024-07-10 | Stop reason: HOSPADM

## 2024-07-08 RX ORDER — BUDESONIDE 0.5 MG/2ML
0.5 INHALANT ORAL EVERY 12 HOURS
Status: DISCONTINUED | OUTPATIENT
Start: 2024-07-08 | End: 2024-07-10 | Stop reason: HOSPADM

## 2024-07-08 RX ORDER — GLUCAGON 1 MG
1 KIT INJECTION
Status: DISCONTINUED | OUTPATIENT
Start: 2024-07-08 | End: 2024-07-10 | Stop reason: HOSPADM

## 2024-07-08 RX ORDER — MAGNESIUM SULFATE HEPTAHYDRATE 40 MG/ML
2 INJECTION, SOLUTION INTRAVENOUS
Status: COMPLETED | OUTPATIENT
Start: 2024-07-08 | End: 2024-07-08

## 2024-07-08 RX ORDER — SODIUM CHLORIDE 0.9 % (FLUSH) 0.9 %
3 SYRINGE (ML) INJECTION
Status: DISCONTINUED | OUTPATIENT
Start: 2024-07-08 | End: 2024-07-10 | Stop reason: HOSPADM

## 2024-07-08 RX ORDER — DOXYCYCLINE HYCLATE 100 MG
100 TABLET ORAL EVERY 12 HOURS
Status: DISCONTINUED | OUTPATIENT
Start: 2024-07-08 | End: 2024-07-10 | Stop reason: HOSPADM

## 2024-07-08 RX ORDER — IBUPROFEN 200 MG
16 TABLET ORAL
Status: DISCONTINUED | OUTPATIENT
Start: 2024-07-08 | End: 2024-07-10 | Stop reason: HOSPADM

## 2024-07-08 RX ORDER — MAGNESIUM SULFATE HEPTAHYDRATE 40 MG/ML
INJECTION, SOLUTION INTRAVENOUS
Status: COMPLETED
Start: 2024-07-08 | End: 2024-07-08

## 2024-07-08 RX ORDER — ACETAMINOPHEN 325 MG/1
650 TABLET ORAL EVERY 4 HOURS PRN
Status: DISCONTINUED | OUTPATIENT
Start: 2024-07-08 | End: 2024-07-10 | Stop reason: HOSPADM

## 2024-07-08 RX ORDER — INSULIN ASPART 100 [IU]/ML
0-10 INJECTION, SOLUTION INTRAVENOUS; SUBCUTANEOUS
Status: DISCONTINUED | OUTPATIENT
Start: 2024-07-08 | End: 2024-07-10 | Stop reason: HOSPADM

## 2024-07-08 RX ADMIN — MAGNESIUM SULFATE HEPTAHYDRATE 2 G: 40 INJECTION, SOLUTION INTRAVENOUS at 03:07

## 2024-07-08 RX ADMIN — IPRATROPIUM BROMIDE AND ALBUTEROL SULFATE 3 ML: .5; 3 SOLUTION RESPIRATORY (INHALATION) at 12:07

## 2024-07-08 RX ADMIN — BENZONATATE 100 MG: 100 CAPSULE ORAL at 03:07

## 2024-07-08 RX ADMIN — BUDESONIDE INHALATION 0.5 MG: 0.5 SUSPENSION RESPIRATORY (INHALATION) at 07:07

## 2024-07-08 RX ADMIN — ACETAMINOPHEN 650 MG: 325 TABLET ORAL at 06:07

## 2024-07-08 RX ADMIN — METHYLPREDNISOLONE SODIUM SUCCINATE 80 MG: 40 INJECTION, POWDER, FOR SOLUTION INTRAMUSCULAR; INTRAVENOUS at 09:07

## 2024-07-08 RX ADMIN — SODIUM CHLORIDE: 9 INJECTION, SOLUTION INTRAVENOUS at 06:07

## 2024-07-08 RX ADMIN — METHYLPREDNISOLONE SODIUM SUCCINATE 125 MG: 125 INJECTION, POWDER, FOR SOLUTION INTRAMUSCULAR; INTRAVENOUS at 12:07

## 2024-07-08 RX ADMIN — IPRATROPIUM BROMIDE AND ALBUTEROL SULFATE 3 ML: .5; 3 SOLUTION RESPIRATORY (INHALATION) at 07:07

## 2024-07-08 RX ADMIN — ARFORMOTEROL TARTRATE 15 MCG: 15 SOLUTION RESPIRATORY (INHALATION) at 07:07

## 2024-07-08 RX ADMIN — DOXYCYCLINE HYCLATE 100 MG: 100 TABLET, COATED ORAL at 08:07

## 2024-07-08 NOTE — H&P
[facility] Medicine  History & Physical    Patient Name: Sondra Beltran  MRN: [id number]  Patient Class: OP- Observation  Admission Date: 7/8/2024  Attending Physician: Shelli Oden MD   Primary Care Provider: Zahra Scott MD         Patient information was obtained from patient, past medical records, and ER records.     Subjective:     Principal Problem:Moderate asthma with exacerbation    Chief Complaint:   Chief Complaint   Patient presents with    Shortness of Breath    Asthma     Started Friday , presently on steroids         HPI: Sondra Beltran 54 year female who presents emergency room for evaluation of shortness a breath and wheezing.  She also endorses a nonproductive cough over the past several days.  She was seen in urgent care several days ago where she received steroids and a Z-Matt.  She has been hospitalized in the past for asthma but has never been intubated for asthma.  She has been using albuterol nebs without improvement.  Previous medical history includes immune deficiency, obesity, hypertension, and asthma.  She does not smoke.  ER workup: CBC unremarkable.  CMP with glucose of 204 otherwise unremarkable.  COVID test pending.  Chest x-ray was unremarkable.  Patient was given Solu-Medrol and DuoNebs with some improvement in symptoms.  Patient admitted to Hospital Medicine for treatment and management.  Patient placed on COPD pathway.                Past Medical History:   Diagnosis Date    Asthma     Last ER visit approx 2010. usually after illness    Benign essential HTN 06/19/2017    CVID (common variable immunodeficiency)     Neck strain 05/17/2016    Thyroid nodule 06/28/2017       Past Surgical History:   Procedure Laterality Date    CATARACT EXTRACTION W/  INTRAOCULAR LENS IMPLANT Left 10/25/2023    Procedure: CEIOL OS;  Surgeon: Suhail Espinal MD;  Location: [facility] OR;  Service: Ophthalmology;  Laterality: Left;    CATARACT EXTRACTION W/   INTRAOCULAR LENS IMPLANT Right 11/15/2023    Procedure: CEIOL OD;  Surgeon: Suhail Espinal MD;  Location: [facility] OR;  Service: Ophthalmology;  Laterality: Right;    cervial cone surgery      CLOSURE OF VESICOVAGINAL FISTULA N/A 06/06/2019    Procedure: excision of vaginal lesion vs urethral diverticulectomy;  Surgeon: Sondra Cobb MD;  Location: [facility] OR;  Service: Urology;  Laterality: N/A;   to assist, first case, please put  as co-surgeon    COLONOSCOPY N/A 5/29/2023    Procedure: COLONOSCOPY;  Surgeon: Yimi Gan MD;  Location: [facility] ENDO;  Service: Endoscopy;  Laterality: N/A;    CYSTOSCOPY N/A 04/22/2019    Procedure: CYSTOSCOPY;  Surgeon: Sondra Cobb MD;  Location: [facility] OR;  Service: Urology;  Laterality: N/A;    HYSTERECTOMY      KNEE SURGERY      nose surgery      VAGINOSCOPY N/A 04/22/2019    Procedure: VAGINOSCOPY;  Surgeon: Sondra Cobb MD;  Location: [facility] OR;  Service: Urology;  Laterality: N/A;       Review of patient's allergies indicates:   Allergen Reactions    Sulfamethoxazole-trimethoprim Rash and Hives       No current facility-administered medications on file prior to encounter.     Current Outpatient Medications on File Prior to Encounter   Medication Sig    albuterol (ACCUNEB) 1.25 mg/3 mL Nebu Inhale 6 mL 3 times a day by inhalation route.    albuterol sulfate (INV ALBUTEROL) 90 mcg inhalation Inhale into the lungs as needed. Take one puff by mouth as directed by Physician. Investigational Medication. Patient Study#    .    ascorbic acid, vitamin C, (VITAMIN C) 500 MG tablet Take 500 mg by mouth 3 (three) times daily.    azelastine (ASTELIN) 137 mcg (0.1 %) nasal spray SMARTSIG:Both Nares  Strength: 137 mcg (0.1 %)    azithromycin (Z-KEO) 250 MG tablet Take 2 tablets by mouth on day 1; Take 1 tablet by mouth on days 2-5    budesonide-formoterol 160-4.5 mcg (SYMBICORT) 160-4.5 mcg/actuation HFAA Inhale 2 puffs into the lungs every  12 (twelve) hours. Controller    fluticasone propionate (FLONASE) 50 mcg/actuation nasal spray 2 sprays (100 mcg total) by Each Nostril route 2 (two) times daily.    guaiFENesin (MUCINEX) 600 mg 12 hr tablet Take 2 tablets (1,200 mg total) by mouth 2 (two) times daily.    ketoconazole (NIZORAL) 2 % cream AAA bid    levocetirizine (XYZAL) 5 MG tablet Take 5 mg by mouth every evening.    levocetirizine (XYZAL) 5 MG tablet Take 1 tablet (5 mg total) by mouth every evening.    montelukast (SINGULAIR) 10 mg tablet Take 10 mg by mouth.    nystatin (MYCOSTATIN) powder Apply topically 2 (two) times daily.    olmesartan (BENICAR) 5 MG Tab Take 1 tablet by mouth once daily    predniSONE (DELTASONE) 20 MG tablet Take 2 tablets (40 mg total) by mouth once daily. for 5 days    promethazine-dextromethorphan (PROMETHAZINE-DM) 6.25-15 mg/5 mL Syrp SMARTSI-10 Milliliter(s) By Mouth Every 6 Hours PRN    promethazine-dextromethorphan (PROMETHAZINE-DM) 6.25-15 mg/5 mL Syrp Take 5 mLs by mouth every 4 (four) hours as needed.    selenium 50 mcg Tab Take 200 mcg by mouth once daily.    tiotropium bromide (SPIRIVA RESPIMAT) 1.25 mcg/actuation inhaler Inhale 2 puffs into the lungs once daily. Controller    traMADoL (ULTRAM) 50 mg tablet Take 1 tablet (50 mg total) by mouth every 6 (six) hours as needed for Pain.    valACYclovir (VALTREX) 1000 MG tablet Take 2 tablets (2,000 mg total) by mouth every 12 (twelve) hours.    [DISCONTINUED] cefdinir (OMNICEF) 300 MG capsule Take 600 mg by mouth.     Family History       Problem Relation (Age of Onset)    Bladder Cancer Father    Cancer Paternal Aunt, Paternal Grandmother    Diabetes Mother    Heart disease Mother, Father    Hyperlipidemia Mother, Father    Hypertension Mother, Father    Rheum arthritis Mother          Tobacco Use    Smoking status: Never     Passive exposure: Never    Smokeless tobacco: Never   Substance and Sexual Activity    Alcohol use: No    Drug use: No    Sexual  activity: Not Currently     Review of Systems   Constitutional:  Positive for activity change. Negative for chills, diaphoresis and fever.   HENT:  Negative for congestion, nosebleeds and tinnitus.    Eyes:  Negative for photophobia and visual disturbance.   Respiratory:  Positive for cough, shortness of breath and wheezing. Negative for chest tightness.    Cardiovascular:  Negative for chest pain, palpitations and leg swelling.   Gastrointestinal:  Negative for abdominal distention, abdominal pain, constipation, diarrhea, nausea and vomiting.   Endocrine: Negative for cold intolerance and heat intolerance.   Genitourinary:  Negative for difficulty urinating, dysuria, frequency, hematuria and urgency.   Musculoskeletal:  Negative for arthralgias, back pain and myalgias.   Skin:  Negative for pallor, rash and wound.   Allergic/Immunologic: Negative for immunocompromised state.   Neurological:  Negative for dizziness, tremors, facial asymmetry, speech difficulty and weakness.   Hematological:  Negative for adenopathy. Does not bruise/bleed easily.   Psychiatric/Behavioral:  Negative for confusion and sleep disturbance. The patient is not nervous/anxious.      Objective:     Vital Signs (Most Recent):  Temp: 97.2 °F (36.2 °C) (07/08/24 1144)  Pulse: 91 (07/08/24 1632)  Resp: (!) 24 (07/08/24 1245)  BP: (!) 143/67 (07/08/24 1632)  SpO2: 99 % (07/08/24 1632) Vital Signs (24h Range):  Temp:  [97.2 °F (36.2 °C)-98 °F (36.7 °C)] 97.2 °F (36.2 °C)  Pulse:  [] 91  Resp:  [24] 24  SpO2:  [94 %-100 %] 99 %  BP: (133-143)/(67-80) 143/67     Weight: 90.7 kg (200 lb)  Body mass index is 34.33 kg/m².     Physical Exam  Vitals and nursing note reviewed.   Constitutional:       General: She is not in acute distress.     Appearance: She is well-developed. She is ill-appearing. She is not diaphoretic.   HENT:      Head: Normocephalic.      Mouth/Throat:      Mouth: Mucous membranes are moist.      Pharynx: Oropharynx is clear.    Eyes:      General: No scleral icterus.     Conjunctiva/sclera: Conjunctivae normal.      Pupils: Pupils are equal, round, and reactive to light.   Neck:      Vascular: No JVD.   Cardiovascular:      Rate and Rhythm: Normal rate and regular rhythm.      Heart sounds: Normal heart sounds. No murmur heard.     No friction rub. No gallop.   Pulmonary:      Effort: Respiratory distress present.      Breath sounds: Wheezing present. No rales.   Abdominal:      General: Bowel sounds are normal. There is no distension.      Palpations: Abdomen is soft.      Tenderness: There is no abdominal tenderness. There is no guarding or rebound.   Musculoskeletal:         General: No tenderness. Normal range of motion.      Cervical back: Normal range of motion and neck supple.   Lymphadenopathy:      Cervical: No cervical adenopathy.   Skin:     General: Skin is warm and dry.      Capillary Refill: Capillary refill takes less than 2 seconds.      Coloration: Skin is not pale.      Findings: No erythema or rash.   Neurological:      Mental Status: She is alert and oriented to person, place, and time.      Cranial Nerves: No cranial nerve deficit.      Sensory: No sensory deficit.      Coordination: Coordination normal.      Deep Tendon Reflexes: Reflexes normal.   Psychiatric:         Behavior: Behavior normal.         Thought Content: Thought content normal.         Judgment: Judgment normal.              CRANIAL NERVES     CN III, IV, VI   Pupils are equal, round, and reactive to light.       Significant Labs: All pertinent labs within the past 24 hours have been reviewed.  CBC:   Recent Labs   Lab 07/08/24  1507   WBC 6.53   HGB 13.4   HCT 41.4        CMP:   Recent Labs   Lab 07/08/24  1507      K 4.2      CO2 19*   *   BUN 15   CREATININE 0.8   CALCIUM 9.6   PROT 6.9   ALBUMIN 3.7   BILITOT 0.4   ALKPHOS 86   AST 20   ALT 28   ANIONGAP 13       Significant Imaging: I have reviewed all pertinent imaging  results/findings within the past 24 hours.    CXR    FINDINGS:  The cardiomediastinal silhouette is within normal limits.  The lungs are well expanded without consolidation or pleural effusion.     Impression:     Negative chest.  Assessment/Plan:     * Moderate asthma with exacerbation  Acute problem   Patient's asthma is with exacerbation noted by continued dyspnea currently.  Patient is currently on COPD Pathway. Continue scheduled inhalers Steroids, Antibiotics, and Supplemental oxygen and monitor respiratory status closely.        Acute hypoxemic respiratory failure  Patient with Hypoxic Respiratory failure which is Acute.  she is not on home oxygen. Supplemental oxygen was provided and noted-      .   Signs/symptoms of respiratory failure include- tachypnea, increased work of breathing, respiratory distress, and wheezing. Contributing diagnoses includes - COPD Labs and images were reviewed. Patient Has not had a recent ABG. Will treat underlying causes and adjust management of respiratory failure as follows- COPD pathway, Solu-Medrol, doxycycline, DuoNebs.      VTE Risk Mitigation (From admission, onward)           Ordered     IP VTE HIGH RISK PATIENT  Once         07/08/24 1621     Place sequential compression device  Until discontinued         07/08/24 1621     Place RADHA hose  Until discontinued         07/08/24 1621                         On 07/08/2024, patient should be placed in hospital observation services under my care in collaboration with Dr. Oden.           Eusebio Xie NP  Department of Hospital Medicine  [facility]

## 2024-07-08 NOTE — SUBJECTIVE & OBJECTIVE
Past Medical History:   Diagnosis Date    Asthma     Last ER visit approx 2010. usually after illness    Benign essential HTN 06/19/2017    CVID (common variable immunodeficiency)     Neck strain 05/17/2016    Thyroid nodule 06/28/2017       Past Surgical History:   Procedure Laterality Date    CATARACT EXTRACTION W/  INTRAOCULAR LENS IMPLANT Left 10/25/2023    Procedure: CEIOL OS;  Surgeon: Suhail Espinal MD;  Location: [facility] OR;  Service: Ophthalmology;  Laterality: Left;    CATARACT EXTRACTION W/  INTRAOCULAR LENS IMPLANT Right 11/15/2023    Procedure: CEIOL OD;  Surgeon: Suhail Espinal MD;  Location: [facility] OR;  Service: Ophthalmology;  Laterality: Right;    cervial cone surgery      CLOSURE OF VESICOVAGINAL FISTULA N/A 06/06/2019    Procedure: excision of vaginal lesion vs urethral diverticulectomy;  Surgeon: Sondra Cobb MD;  Location: [facility] OR;  Service: Urology;  Laterality: N/A;   to assist, first case, please put  as co-surgeon    COLONOSCOPY N/A 5/29/2023    Procedure: COLONOSCOPY;  Surgeon: Yimi Gan MD;  Location: [facility];  Service: Endoscopy;  Laterality: N/A;    CYSTOSCOPY N/A 04/22/2019    Procedure: CYSTOSCOPY;  Surgeon: Sondra Cobb MD;  Location: [facility] OR;  Service: Urology;  Laterality: N/A;    HYSTERECTOMY      KNEE SURGERY      nose surgery      VAGINOSCOPY N/A 04/22/2019    Procedure: VAGINOSCOPY;  Surgeon: Sondra Cobb MD;  Location: [facility] OR;  Service: Urology;  Laterality: N/A;       Review of patient's allergies indicates:   Allergen Reactions    Sulfamethoxazole-trimethoprim Rash and Hives       No current facility-administered medications on file prior to encounter.     Current Outpatient Medications on File Prior to Encounter   Medication Sig    albuterol (ACCUNEB) 1.25 mg/3 mL Nebu Inhale 6 mL 3 times a day by inhalation route.    albuterol sulfate (INV ALBUTEROL) 90 mcg inhalation Inhale into the lungs as needed.  Take one puff by mouth as directed by Physician. Investigational Medication. Patient Study#    .    ascorbic acid, vitamin C, (VITAMIN C) 500 MG tablet Take 500 mg by mouth 3 (three) times daily.    azelastine (ASTELIN) 137 mcg (0.1 %) nasal spray SMARTSIG:Both Nares  Strength: 137 mcg (0.1 %)    azithromycin (Z-KEO) 250 MG tablet Take 2 tablets by mouth on day 1; Take 1 tablet by mouth on days 2-5    budesonide-formoterol 160-4.5 mcg (SYMBICORT) 160-4.5 mcg/actuation HFAA Inhale 2 puffs into the lungs every 12 (twelve) hours. Controller    fluticasone propionate (FLONASE) 50 mcg/actuation nasal spray 2 sprays (100 mcg total) by Each Nostril route 2 (two) times daily.    guaiFENesin (MUCINEX) 600 mg 12 hr tablet Take 2 tablets (1,200 mg total) by mouth 2 (two) times daily.    ketoconazole (NIZORAL) 2 % cream AAA bid    levocetirizine (XYZAL) 5 MG tablet Take 5 mg by mouth every evening.    levocetirizine (XYZAL) 5 MG tablet Take 1 tablet (5 mg total) by mouth every evening.    montelukast (SINGULAIR) 10 mg tablet Take 10 mg by mouth.    nystatin (MYCOSTATIN) powder Apply topically 2 (two) times daily.    olmesartan (BENICAR) 5 MG Tab Take 1 tablet by mouth once daily    predniSONE (DELTASONE) 20 MG tablet Take 2 tablets (40 mg total) by mouth once daily. for 5 days    promethazine-dextromethorphan (PROMETHAZINE-DM) 6.25-15 mg/5 mL Syrp SMARTSI-10 Milliliter(s) By Mouth Every 6 Hours PRN    promethazine-dextromethorphan (PROMETHAZINE-DM) 6.25-15 mg/5 mL Syrp Take 5 mLs by mouth every 4 (four) hours as needed.    selenium 50 mcg Tab Take 200 mcg by mouth once daily.    tiotropium bromide (SPIRIVA RESPIMAT) 1.25 mcg/actuation inhaler Inhale 2 puffs into the lungs once daily. Controller    traMADoL (ULTRAM) 50 mg tablet Take 1 tablet (50 mg total) by mouth every 6 (six) hours as needed for Pain.    valACYclovir (VALTREX) 1000 MG tablet Take 2 tablets (2,000 mg total) by mouth every 12 (twelve) hours.     [DISCONTINUED] cefdinir (OMNICEF) 300 MG capsule Take 600 mg by mouth.     Family History       Problem Relation (Age of Onset)    Bladder Cancer Father    Cancer Paternal Aunt, Paternal Grandmother    Diabetes Mother    Heart disease Mother, Father    Hyperlipidemia Mother, Father    Hypertension Mother, Father    Rheum arthritis Mother          Tobacco Use    Smoking status: Never     Passive exposure: Never    Smokeless tobacco: Never   Substance and Sexual Activity    Alcohol use: No    Drug use: No    Sexual activity: Not Currently     Review of Systems   Constitutional:  Positive for activity change. Negative for chills, diaphoresis and fever.   HENT:  Negative for congestion, nosebleeds and tinnitus.    Eyes:  Negative for photophobia and visual disturbance.   Respiratory:  Positive for cough, shortness of breath and wheezing. Negative for chest tightness.    Cardiovascular:  Negative for chest pain, palpitations and leg swelling.   Gastrointestinal:  Negative for abdominal distention, abdominal pain, constipation, diarrhea, nausea and vomiting.   Endocrine: Negative for cold intolerance and heat intolerance.   Genitourinary:  Negative for difficulty urinating, dysuria, frequency, hematuria and urgency.   Musculoskeletal:  Negative for arthralgias, back pain and myalgias.   Skin:  Negative for pallor, rash and wound.   Allergic/Immunologic: Negative for immunocompromised state.   Neurological:  Negative for dizziness, tremors, facial asymmetry, speech difficulty and weakness.   Hematological:  Negative for adenopathy. Does not bruise/bleed easily.   Psychiatric/Behavioral:  Negative for confusion and sleep disturbance. The patient is not nervous/anxious.      Objective:     Vital Signs (Most Recent):  Temp: 97.2 °F (36.2 °C) (07/08/24 1144)  Pulse: 91 (07/08/24 1632)  Resp: (!) 24 (07/08/24 1245)  BP: (!) 143/67 (07/08/24 1632)  SpO2: 99 % (07/08/24 1632) Vital Signs (24h Range):  Temp:  [97.2 °F (36.2 °C)-98  °F (36.7 °C)] 97.2 °F (36.2 °C)  Pulse:  [] 91  Resp:  [24] 24  SpO2:  [94 %-100 %] 99 %  BP: (133-143)/(67-80) 143/67     Weight: 90.7 kg (200 lb)  Body mass index is 34.33 kg/m².     Physical Exam  Vitals and nursing note reviewed.   Constitutional:       General: She is not in acute distress.     Appearance: She is well-developed. She is ill-appearing. She is not diaphoretic.   HENT:      Head: Normocephalic.      Mouth/Throat:      Mouth: Mucous membranes are moist.      Pharynx: Oropharynx is clear.   Eyes:      General: No scleral icterus.     Conjunctiva/sclera: Conjunctivae normal.      Pupils: Pupils are equal, round, and reactive to light.   Neck:      Vascular: No JVD.   Cardiovascular:      Rate and Rhythm: Normal rate and regular rhythm.      Heart sounds: Normal heart sounds. No murmur heard.     No friction rub. No gallop.   Pulmonary:      Effort: Respiratory distress present.      Breath sounds: Wheezing present. No rales.   Abdominal:      General: Bowel sounds are normal. There is no distension.      Palpations: Abdomen is soft.      Tenderness: There is no abdominal tenderness. There is no guarding or rebound.   Musculoskeletal:         General: No tenderness. Normal range of motion.      Cervical back: Normal range of motion and neck supple.   Lymphadenopathy:      Cervical: No cervical adenopathy.   Skin:     General: Skin is warm and dry.      Capillary Refill: Capillary refill takes less than 2 seconds.      Coloration: Skin is not pale.      Findings: No erythema or rash.   Neurological:      Mental Status: She is alert and oriented to person, place, and time.      Cranial Nerves: No cranial nerve deficit.      Sensory: No sensory deficit.      Coordination: Coordination normal.      Deep Tendon Reflexes: Reflexes normal.   Psychiatric:         Behavior: Behavior normal.         Thought Content: Thought content normal.         Judgment: Judgment normal.              CRANIAL NERVES      CN III, IV, VI   Pupils are equal, round, and reactive to light.       Significant Labs: All pertinent labs within the past 24 hours have been reviewed.  CBC:   Recent Labs   Lab 07/08/24  1507   WBC 6.53   HGB 13.4   HCT 41.4        CMP:   Recent Labs   Lab 07/08/24  1507      K 4.2      CO2 19*   *   BUN 15   CREATININE 0.8   CALCIUM 9.6   PROT 6.9   ALBUMIN 3.7   BILITOT 0.4   ALKPHOS 86   AST 20   ALT 28   ANIONGAP 13       Significant Imaging: I have reviewed all pertinent imaging results/findings within the past 24 hours.    CXR    FINDINGS:  The cardiomediastinal silhouette is within normal limits.  The lungs are well expanded without consolidation or pleural effusion.     Impression:     Negative chest.

## 2024-07-08 NOTE — ED PROVIDER NOTES
Encounter Date: 7/8/2024       History     Chief Complaint   Patient presents with    Shortness of Breath    Asthma     Started Friday , presently on steroids      HPI  patient is a 54-year-old woman who presents emergency department for evaluation of shortness of breath and wheezing with associated cough that began Friday.  She has a history of asthma has been taking steroids and doing breathing treatments every few hours without improvement.  No history of intubation.  No fever.  Review of patient's allergies indicates:   Allergen Reactions    Sulfamethoxazole-trimethoprim Rash and Hives     Past Medical History:   Diagnosis Date    Asthma     Last ER visit approx 2010. usually after illness    Benign essential HTN 06/19/2017    CVID (common variable immunodeficiency)     Neck strain 05/17/2016    Thyroid nodule 06/28/2017     Past Surgical History:   Procedure Laterality Date    CATARACT EXTRACTION W/  INTRAOCULAR LENS IMPLANT Left 10/25/2023    Procedure: CEIOL OS;  Surgeon: Suhail Espinal MD;  Location: [facility] OR;  Service: Ophthalmology;  Laterality: Left;    CATARACT EXTRACTION W/  INTRAOCULAR LENS IMPLANT Right 11/15/2023    Procedure: CEIOL OD;  Surgeon: Suhail Espinal MD;  Location: [facility] OR;  Service: Ophthalmology;  Laterality: Right;    cervial cone surgery      CLOSURE OF VESICOVAGINAL FISTULA N/A 06/06/2019    Procedure: excision of vaginal lesion vs urethral diverticulectomy;  Surgeon: Sondra Cobb MD;  Location: [facility] OR;  Service: Urology;  Laterality: N/A;   to assist, first case, please put  as co-surgeon    COLONOSCOPY N/A 5/29/2023    Procedure: COLONOSCOPY;  Surgeon: Yimi Gan MD;  Location: [facility];  Service: Endoscopy;  Laterality: N/A;    CYSTOSCOPY N/A 04/22/2019    Procedure: CYSTOSCOPY;  Surgeon: Sondra Cobb MD;  Location: [facility] OR;  Service: Urology;  Laterality: N/A;    HYSTERECTOMY      KNEE SURGERY      nose surgery       VAGINOSCOPY N/A 04/22/2019    Procedure: VAGINOSCOPY;  Surgeon: Sondra Cobb MD;  [facility];  Service: Urology;  Laterality: N/A;     Family History   Problem Relation Name Age of Onset    Diabetes Mother      Hypertension Mother      Heart disease Mother      Rheum arthritis Mother      Hyperlipidemia Mother      Heart disease Father      Hypertension Father      Hyperlipidemia Father      Bladder Cancer Father      Cancer Paternal Aunt      Cancer Paternal Grandmother      Glaucoma Neg Hx      Macular degeneration Neg Hx       Social History     Tobacco Use    Smoking status: Never     Passive exposure: Never    Smokeless tobacco: Never   Substance Use Topics    Alcohol use: No    Drug use: No     Review of Systems   Constitutional:  Negative for fever.   HENT:  Negative for sore throat.    Respiratory:  Positive for cough, shortness of breath and wheezing.    Cardiovascular:  Negative for chest pain.   Gastrointestinal:  Negative for nausea.   Genitourinary:  Negative for dysuria.   Musculoskeletal:  Negative for back pain.   Skin:  Negative for rash.   Neurological:  Negative for weakness.   Hematological:  Does not bruise/bleed easily.       Physical Exam     Initial Vitals [07/08/24 1144]   BP Pulse Resp Temp SpO2   133/67 95 (!) 24 97.2 °F (36.2 °C) 96 %      MAP       --         Physical Exam    Constitutional: Vital signs are normal. She appears well-developed and well-nourished.  Non-toxic appearance. She appears distressed.   HENT:   Head: Normocephalic and atraumatic.   Eyes: EOM are normal. Pupils are equal, round, and reactive to light.   Neck: Neck supple. No JVD present.   Normal range of motion.  Cardiovascular:  Normal rate, regular rhythm, normal heart sounds and intact distal pulses.     Exam reveals no gallop and no friction rub.       No murmur heard.  Pulmonary/Chest: Tachypnea noted. She has wheezes. She has no rhonchi. She has no rales.   Abdominal: Abdomen is soft. Bowel  sounds are normal. There is no abdominal tenderness. There is no rebound and no guarding.   Musculoskeletal:         General: Normal range of motion.      Cervical back: Normal range of motion and neck supple. No rigidity.     Neurological: She is alert and oriented to person, place, and time. She has normal strength and normal reflexes. No cranial nerve deficit or sensory deficit. She exhibits normal muscle tone. Coordination normal. GCS eye subscore is 4. GCS verbal subscore is 5. GCS motor subscore is 6.   Skin: Skin is warm and dry.   Psychiatric: She has a normal mood and affect. Her speech is normal and behavior is normal. She is not actively hallucinating.         ED Course   Procedures  Labs Reviewed   CBC W/ AUTO DIFFERENTIAL - Abnormal; Notable for the following components:       Result Value    Immature Granulocytes 0.6 (*)     Lymph # 0.8 (*)     Mono # 0.1 (*)     Gran % 84.7 (*)     Lymph % 12.4 (*)     Mono % 2.1 (*)     All other components within normal limits   COMPREHENSIVE METABOLIC PANEL   SARS-COV-2 RNA AMPLIFICATION, QUAL          Imaging Results              X-Ray Chest AP Portable (Final result)  Result time 07/08/24 12:55:48      Final result by Eusebio Villarreal MD (07/08/24 12:55:48)                   Impression:      Negative chest.      Electronically signed by: Eusebio Villarreal MD  Date:    07/08/2024  Time:    12:55               Narrative:    EXAMINATION:  XR CHEST AP PORTABLE    CLINICAL HISTORY:  Asthma;    TECHNIQUE:  Single frontal view of the chest was performed.    COMPARISON:  10/10/2023    FINDINGS:  The cardiomediastinal silhouette is within normal limits.  The lungs are well expanded without consolidation or pleural effusion.                                       Medications   magnesium sulfate 2g in water 50mL IVPB (premix) (2 g Intravenous New Bag 7/8/24 1506)   albuterol-ipratropium 2.5 mg-0.5 mg/3 mL nebulizer solution 3 mL (3 mLs Nebulization Given 7/8/24 1255)    methylPREDNISolone sodium succinate injection 125 mg (125 mg Intravenous Given 7/8/24 1237)   benzonatate capsule 100 mg (100 mg Oral Given 7/8/24 2544)     Medical Decision Making  54-year-old woman with history of asthma presents with cough, wheezing, shortness of breath since Friday.  No improvement with outpatient steroids and breathing treatments.  Audibly wheezing, actively coughing tachypneic on examination.  Physical exam consistent with asthma exacerbation.  No hypoxia however patient appears uncomfortable with significant symptoms.  She is started on IV steroids and 3 rounds DuoNebs without improvement.  Chest x-ray shows no evidence of pneumonia or pneumothorax.  Discussed Hospital Medicine who will admit the patient for further steroids and breathing treatments for treatment of asthma exacerbation.    Amount and/or Complexity of Data Reviewed  Labs: ordered.  Radiology: ordered.    Risk  Prescription drug management.                                      Clinical Impression:  Final diagnoses:  [J45.41] Moderate persistent asthma with exacerbation (Primary)                 Ralph Truong MD  07/08/24 [id number]

## 2024-07-08 NOTE — ASSESSMENT & PLAN NOTE
Patient with Hypoxic Respiratory failure which is Acute.  she is not on home oxygen. Supplemental oxygen was provided and noted-      .   Signs/symptoms of respiratory failure include- tachypnea, increased work of breathing, respiratory distress, and wheezing. Contributing diagnoses includes - COPD Labs and images were reviewed. Patient Has not had a recent ABG. Will treat underlying causes and adjust management of respiratory failure as follows- COPD pathway, Solu-Medrol, doxycycline, DuoNebs.

## 2024-07-08 NOTE — PROGRESS NOTES
Automatic Inhaler to Nebulizer Interchange    fluticasone/vilanterol (Breo Ellipta) 100 mcg/25 mcg changed to budesonide 0.5 mg twice daily AND arformoterol 15 mcg twice daily per [facility] Automatic Therapeutic Substitutions Protocol.    Please contact pharmacy at extension [phone] with any questions.     Thank you,   Ramesh Quijano

## 2024-07-08 NOTE — ED NOTES
Presents to the ED d/t SOB x 5 days. States she went to , received ABX and steroid but experienced minimal relief. Pt is SOB, labored breathing, horse, denies resp compromise and is 98% on RA. Placed on 2L of O2 via NC for comfort. Pt is AAOx4 with a GCS 15. Hx of asthma

## 2024-07-08 NOTE — PROGRESS NOTES
Subjective:       Patient ID: Sondra Beltran is a 54 y.o. female.    Chief Complaint: shortness of breath       Sondra Beltran is a 54 y.o. female with asthma, HTN who presents to clinic for evaluation of shortness of breath x 5 days. Associated symptoms include nasal congestion, ear pain, cough, chest tightness and wheezing. She was seen at urgent care on 7/6 for similar symptoms and provided with Zpak, Prednisone and cough medications. She reports no improvement in symptoms, noting she remains short of breath. Last nebulized treatment at 0900 this morning. She has been using maintenance inhalers as well.         Review of Systems   Constitutional:  Negative for chills, fatigue and fever.   HENT:  Positive for congestion and ear pain. Negative for sore throat.    Respiratory:  Positive for cough, chest tightness, shortness of breath and wheezing.    Cardiovascular:  Negative for chest pain.   Neurological:  Negative for dizziness and headaches.         Past Medical History:   Diagnosis Date    Asthma     Last ER visit approx 2010. usually after illness    Benign essential HTN 06/19/2017    CVID (common variable immunodeficiency)     Neck strain 05/17/2016    Thyroid nodule 06/28/2017       Review of patient's allergies indicates:   Allergen Reactions    Sulfamethoxazole-trimethoprim Rash and Hives         Current Outpatient Medications:     albuterol (ACCUNEB) 1.25 mg/3 mL Nebu, Inhale 6 mL 3 times a day by inhalation route., Disp: , Rfl:     albuterol sulfate (INV ALBUTEROL) 90 mcg inhalation, Inhale into the lungs as needed. Take one puff by mouth as directed by Physician. Investigational Medication. Patient Study#    ., Disp: , Rfl:     ascorbic acid, vitamin C, (VITAMIN C) 500 MG tablet, Take 500 mg by mouth 3 (three) times daily., Disp: , Rfl:     azelastine (ASTELIN) 137 mcg (0.1 %) nasal spray, SMARTSIG:Both Nares Strength: 137 mcg (0.1 %), Disp: 30 mL, Rfl: 3    azithromycin (Z-KEO) 250 MG  tablet, Take 2 tablets by mouth on day 1; Take 1 tablet by mouth on days 2-5, Disp: 6 tablet, Rfl: 0    budesonide-formoterol 160-4.5 mcg (SYMBICORT) 160-4.5 mcg/actuation HFAA, Inhale 2 puffs into the lungs every 12 (twelve) hours. Controller, Disp: 6 g, Rfl: 3    fluticasone propionate (FLONASE) 50 mcg/actuation nasal spray, 2 sprays (100 mcg total) by Each Nostril route 2 (two) times daily., Disp: 9.9 g, Rfl: 3    guaiFENesin (MUCINEX) 600 mg 12 hr tablet, Take 2 tablets (1,200 mg total) by mouth 2 (two) times daily., Disp: 30 tablet, Rfl: 0    ketoconazole (NIZORAL) 2 % cream, AAA bid, Disp: 60 g, Rfl: 3    levocetirizine (XYZAL) 5 MG tablet, Take 5 mg by mouth every evening., Disp: , Rfl:     levocetirizine (XYZAL) 5 MG tablet, Take 1 tablet (5 mg total) by mouth every evening., Disp: 30 tablet, Rfl: 0    montelukast (SINGULAIR) 10 mg tablet, Take 10 mg by mouth., Disp: , Rfl:     nystatin (MYCOSTATIN) powder, Apply topically 2 (two) times daily., Disp: 15 g, Rfl: 0    olmesartan (BENICAR) 5 MG Tab, Take 1 tablet by mouth once daily, Disp: 90 tablet, Rfl: 0    predniSONE (DELTASONE) 20 MG tablet, Take 2 tablets (40 mg total) by mouth once daily. for 5 days, Disp: 10 tablet, Rfl: 0    promethazine-dextromethorphan (PROMETHAZINE-DM) 6.25-15 mg/5 mL Syrp, SMARTSI-10 Milliliter(s) By Mouth Every 6 Hours PRN, Disp: , Rfl:     promethazine-dextromethorphan (PROMETHAZINE-DM) 6.25-15 mg/5 mL Syrp, Take 5 mLs by mouth every 4 (four) hours as needed., Disp: 118 mL, Rfl: 0    selenium 50 mcg Tab, Take 200 mcg by mouth once daily., Disp: , Rfl:     tiotropium bromide (SPIRIVA RESPIMAT) 1.25 mcg/actuation inhaler, Inhale 2 puffs into the lungs once daily. Controller, Disp: 4 g, Rfl: 3    traMADoL (ULTRAM) 50 mg tablet, Take 1 tablet (50 mg total) by mouth every 6 (six) hours as needed for Pain., Disp: 20 each, Rfl: 0    valACYclovir (VALTREX) 1000 MG tablet, Take 2 tablets (2,000 mg total) by mouth every 12 (twelve)  "hours., Disp: 4 tablet, Rfl: 2    Objective:        Physical Exam  Vitals reviewed.   Constitutional:       Appearance: Normal appearance.   HENT:      Head: Normocephalic and atraumatic.      Right Ear: Tympanic membrane and ear canal normal.      Left Ear: Tympanic membrane and ear canal normal.   Eyes:      Conjunctiva/sclera: Conjunctivae normal.   Cardiovascular:      Rate and Rhythm: Regular rhythm. Tachycardia present.      Heart sounds: Normal heart sounds.   Pulmonary:      Breath sounds: Rhonchi present.      Comments: Increased work of breathing  Musculoskeletal:         General: Normal range of motion.      Cervical back: Normal range of motion and neck supple.   Skin:     General: Skin is warm and dry.   Neurological:      Mental Status: She is alert and oriented to person, place, and time.   Psychiatric:         Behavior: Behavior normal.           Visit Vitals  BP (!) 140/80 (BP Location: Right arm, Patient Position: Sitting, BP Method: Small (Manual))   Pulse 108   Temp 98 °F (36.7 °C) (Oral)   Ht 5' 4" (1.626 m)   Wt 93 kg (205 lb 0.4 oz)   SpO2 97%   BMI 35.19 kg/m²      Assessment:         1. Severe persistent asthma with (acute) exacerbation    2. Benign essential HTN        Plan:         Diagnoses and all orders for this visit:    Severe persistent asthma with (acute) exacerbation    Benign essential HTN     Recommend the patient go to ED for further evaluation and management due to acute worsening of symptoms despite previous therapies.     Called and spoke to Grace at [facility].         Family Medicine Physician Assistant     Future Appointments       Date Provider Specialty Appt Notes    7/11/2024 Zahra Scott MD Family Medicine 6 month f/u             Tests to Keep You Healthy    Mammogram: Met on 3/14/2024  Colon Cancer Screening: Met on 5/29/2023  Last Blood Pressure <= 139/89 (7/8/2024): NO       I spent a total of 20 minutes on the day of the visit.This includes face to face time and " non-face to face time preparing to see the patient (eg, review of tests), obtaining and/or reviewing separately obtained history, documenting clinical information in the electronic or other health record, independently interpreting results and communicating results to the patient/family/caregiver, or care coordinator.

## 2024-07-08 NOTE — NURSING
Nurses Note -- 4 Eyes      7/8/2024   6:30 PM      Skin assessed during: Admit      [x] No Altered Skin Integrity Present    []Prevention Measures Documented      [] Yes- Altered Skin Integrity Present or Discovered   [] LDA Added if Not in Epic (Describe Wound)   [] New Altered Skin Integrity was Present on Admit and Documented in LDA   [] Wound Image Taken    Wound Care Consulted? No    Attending Nurse:  Aylin Lujan RN/Staff Member:   Adelaide

## 2024-07-08 NOTE — LETTER
July 10, 2024         [address] [place] [address] DR LISETTE MOORE [address]       Patient: Sondra Beltran   YOB: 1970  Date of Visit: 07/10/2024    To Whom It May Concern:    Felicity Beltran  was at [facility] from 07/08/2024 - 07/10/2024. The patient may return to work/school on 07/17/2024 with no restrictions. If you have any questions or concerns, or if I can be of further assistance, please do not hesitate to contact me.    Sincerely,    Anthony Childers RN

## 2024-07-08 NOTE — ASSESSMENT & PLAN NOTE
Acute problem   Patient's asthma is with exacerbation noted by continued dyspnea currently.  Patient is currently on COPD Pathway. Continue scheduled inhalers Steroids, Antibiotics, and Supplemental oxygen and monitor respiratory status closely.

## 2024-07-08 NOTE — HPI
Sondra Beltran 54 year female who presents emergency room for evaluation of shortness a breath and wheezing.  She also endorses a nonproductive cough over the past several days.  She was seen in urgent care several days ago where she received steroids and a Z-Matt.  She has been hospitalized in the past for asthma but has never been intubated for asthma.  She has been using albuterol nebs without improvement.  Previous medical history includes immune deficiency, obesity, hypertension, and asthma.  She does not smoke.  ER workup: CBC unremarkable.  CMP with glucose of 204 otherwise unremarkable.  COVID test pending.  Chest x-ray was unremarkable.  Patient was given Solu-Medrol and DuoNebs with some improvement in symptoms.  Patient admitted to Hospital Medicine for treatment and management.  Patient placed on COPD pathway.

## 2024-07-09 LAB
ANION GAP SERPL CALC-SCNC: 11 MMOL/L (ref 8–16)
BASOPHILS # BLD AUTO: 0.01 K/UL (ref 0–0.2)
BASOPHILS NFR BLD: 0.1 % (ref 0–1.9)
BUN SERPL-MCNC: 13 MG/DL (ref 6–20)
CALCIUM SERPL-MCNC: 9.4 MG/DL (ref 8.7–10.5)
CHLORIDE SERPL-SCNC: 111 MMOL/L (ref 95–110)
CO2 SERPL-SCNC: 20 MMOL/L (ref 23–29)
CREAT SERPL-MCNC: 0.7 MG/DL (ref 0.5–1.4)
DIFFERENTIAL METHOD BLD: ABNORMAL
EOSINOPHIL # BLD AUTO: 0 K/UL (ref 0–0.5)
EOSINOPHIL NFR BLD: 0 % (ref 0–8)
ERYTHROCYTE [DISTWIDTH] IN BLOOD BY AUTOMATED COUNT: 12.2 % (ref 11.5–14.5)
EST. GFR  (NO RACE VARIABLE): >60 ML/MIN/1.73 M^2
GLUCOSE SERPL-MCNC: 162 MG/DL (ref 70–110)
HCT VFR BLD AUTO: 39.9 % (ref 37–48.5)
HGB BLD-MCNC: 13.1 G/DL (ref 12–16)
IMM GRANULOCYTES # BLD AUTO: 0.03 K/UL (ref 0–0.04)
IMM GRANULOCYTES NFR BLD AUTO: 0.4 % (ref 0–0.5)
LYMPHOCYTES # BLD AUTO: 1.2 K/UL (ref 1–4.8)
LYMPHOCYTES NFR BLD: 14 % (ref 18–48)
MAGNESIUM SERPL-MCNC: 2.1 MG/DL (ref 1.6–2.6)
MCH RBC QN AUTO: 30 PG (ref 27–31)
MCHC RBC AUTO-ENTMCNC: 32.8 G/DL (ref 32–36)
MCV RBC AUTO: 91 FL (ref 82–98)
MONOCYTES # BLD AUTO: 0.2 K/UL (ref 0.3–1)
MONOCYTES NFR BLD: 1.8 % (ref 4–15)
NEUTROPHILS # BLD AUTO: 6.9 K/UL (ref 1.8–7.7)
NEUTROPHILS NFR BLD: 83.7 % (ref 38–73)
NRBC BLD-RTO: 0 /100 WBC
PHOSPHATE SERPL-MCNC: 3.5 MG/DL (ref 2.7–4.5)
PLATELET # BLD AUTO: 187 K/UL (ref 150–450)
PMV BLD AUTO: 11.3 FL (ref 9.2–12.9)
POCT GLUCOSE: 130 MG/DL (ref 70–110)
POCT GLUCOSE: 153 MG/DL (ref 70–110)
POCT GLUCOSE: 187 MG/DL (ref 70–110)
POTASSIUM SERPL-SCNC: 4.5 MMOL/L (ref 3.5–5.1)
RBC # BLD AUTO: 4.37 M/UL (ref 4–5.4)
SODIUM SERPL-SCNC: 142 MMOL/L (ref 136–145)
WBC # BLD AUTO: 8.23 K/UL (ref 3.9–12.7)

## 2024-07-09 PROCEDURE — 94761 N-INVAS EAR/PLS OXIMETRY MLT: CPT

## 2024-07-09 PROCEDURE — G0378 HOSPITAL OBSERVATION PER HR: HCPCS

## 2024-07-09 PROCEDURE — 96376 TX/PRO/DX INJ SAME DRUG ADON: CPT

## 2024-07-09 PROCEDURE — 96361 HYDRATE IV INFUSION ADD-ON: CPT

## 2024-07-09 PROCEDURE — 83735 ASSAY OF MAGNESIUM: CPT | Performed by: NURSE PRACTITIONER

## 2024-07-09 PROCEDURE — 85025 COMPLETE CBC W/AUTO DIFF WBC: CPT | Performed by: NURSE PRACTITIONER

## 2024-07-09 PROCEDURE — 96372 THER/PROPH/DIAG INJ SC/IM: CPT | Performed by: NURSE PRACTITIONER

## 2024-07-09 PROCEDURE — 27000221 HC OXYGEN, UP TO 24 HOURS

## 2024-07-09 PROCEDURE — 94640 AIRWAY INHALATION TREATMENT: CPT | Mod: XB

## 2024-07-09 PROCEDURE — 25000242 PHARM REV CODE 250 ALT 637 W/ HCPCS: Performed by: NURSE PRACTITIONER

## 2024-07-09 PROCEDURE — 25000242 PHARM REV CODE 250 ALT 637 W/ HCPCS: Performed by: INTERNAL MEDICINE

## 2024-07-09 PROCEDURE — 84100 ASSAY OF PHOSPHORUS: CPT | Performed by: NURSE PRACTITIONER

## 2024-07-09 PROCEDURE — 25000003 PHARM REV CODE 250

## 2024-07-09 PROCEDURE — 25000003 PHARM REV CODE 250: Performed by: NURSE PRACTITIONER

## 2024-07-09 PROCEDURE — 63600175 PHARM REV CODE 636 W HCPCS: Performed by: NURSE PRACTITIONER

## 2024-07-09 PROCEDURE — 80048 BASIC METABOLIC PNL TOTAL CA: CPT | Performed by: NURSE PRACTITIONER

## 2024-07-09 PROCEDURE — 36415 COLL VENOUS BLD VENIPUNCTURE: CPT | Performed by: NURSE PRACTITIONER

## 2024-07-09 RX ORDER — GUAIFENESIN 100 MG/5ML
200 SOLUTION ORAL EVERY 4 HOURS PRN
Status: DISCONTINUED | OUTPATIENT
Start: 2024-07-09 | End: 2024-07-10 | Stop reason: HOSPADM

## 2024-07-09 RX ORDER — IPRATROPIUM BROMIDE AND ALBUTEROL SULFATE 2.5; .5 MG/3ML; MG/3ML
3 SOLUTION RESPIRATORY (INHALATION) EVERY 6 HOURS
Status: DISCONTINUED | OUTPATIENT
Start: 2024-07-09 | End: 2024-07-09

## 2024-07-09 RX ORDER — BENZONATATE 100 MG/1
100 CAPSULE ORAL 3 TIMES DAILY PRN
Status: DISCONTINUED | OUTPATIENT
Start: 2024-07-09 | End: 2024-07-10 | Stop reason: HOSPADM

## 2024-07-09 RX ORDER — IPRATROPIUM BROMIDE AND ALBUTEROL SULFATE 2.5; .5 MG/3ML; MG/3ML
3 SOLUTION RESPIRATORY (INHALATION) EVERY 4 HOURS
Status: DISCONTINUED | OUTPATIENT
Start: 2024-07-09 | End: 2024-07-10 | Stop reason: HOSPADM

## 2024-07-09 RX ADMIN — GUAIFENESIN 200 MG: 200 SOLUTION ORAL at 09:07

## 2024-07-09 RX ADMIN — METHYLPREDNISOLONE SODIUM SUCCINATE 80 MG: 40 INJECTION, POWDER, FOR SOLUTION INTRAMUSCULAR; INTRAVENOUS at 05:07

## 2024-07-09 RX ADMIN — BUDESONIDE INHALATION 0.5 MG: 0.5 SUSPENSION RESPIRATORY (INHALATION) at 07:07

## 2024-07-09 RX ADMIN — GUAIFENESIN 200 MG: 200 SOLUTION ORAL at 08:07

## 2024-07-09 RX ADMIN — ARFORMOTEROL TARTRATE 15 MCG: 15 SOLUTION RESPIRATORY (INHALATION) at 07:07

## 2024-07-09 RX ADMIN — GUAIFENESIN 200 MG: 200 SOLUTION ORAL at 05:07

## 2024-07-09 RX ADMIN — INSULIN ASPART 1 UNITS: 100 INJECTION, SOLUTION INTRAVENOUS; SUBCUTANEOUS at 08:07

## 2024-07-09 RX ADMIN — MELATONIN TAB 3 MG 6 MG: 3 TAB at 08:07

## 2024-07-09 RX ADMIN — IPRATROPIUM BROMIDE AND ALBUTEROL SULFATE 3 ML: .5; 3 SOLUTION RESPIRATORY (INHALATION) at 12:07

## 2024-07-09 RX ADMIN — IPRATROPIUM BROMIDE AND ALBUTEROL SULFATE 3 ML: .5; 3 SOLUTION RESPIRATORY (INHALATION) at 07:07

## 2024-07-09 RX ADMIN — METHYLPREDNISOLONE SODIUM SUCCINATE 80 MG: 40 INJECTION, POWDER, FOR SOLUTION INTRAMUSCULAR; INTRAVENOUS at 01:07

## 2024-07-09 RX ADMIN — BENZONATATE 100 MG: 100 CAPSULE ORAL at 08:07

## 2024-07-09 RX ADMIN — MONTELUKAST 10 MG: 10 TABLET, FILM COATED ORAL at 08:07

## 2024-07-09 RX ADMIN — BENZONATATE 100 MG: 100 CAPSULE ORAL at 01:07

## 2024-07-09 RX ADMIN — DOXYCYCLINE HYCLATE 100 MG: 100 TABLET, COATED ORAL at 08:07

## 2024-07-09 RX ADMIN — METHYLPREDNISOLONE SODIUM SUCCINATE 80 MG: 40 INJECTION, POWDER, FOR SOLUTION INTRAMUSCULAR; INTRAVENOUS at 09:07

## 2024-07-09 RX ADMIN — ARFORMOTEROL TARTRATE 15 MCG: 15 SOLUTION RESPIRATORY (INHALATION) at 08:07

## 2024-07-09 RX ADMIN — GUAIFENESIN 200 MG: 200 SOLUTION ORAL at 01:07

## 2024-07-09 RX ADMIN — BENZONATATE 100 MG: 100 CAPSULE ORAL at 05:07

## 2024-07-09 RX ADMIN — ACETAMINOPHEN 650 MG: 325 TABLET ORAL at 05:07

## 2024-07-09 NOTE — HOSPITAL COURSE
Patient is a 54 year female with history of asthma. Patient was admitted with acute hypoxic respiratory failure due to acute asthma exacerbation. Patient was started on steroids and breathing Rx. Patient is weaned off to RA today. She is feeling improved. No wheezing was heard. Home oxygen eval was done. DC with PO Prednisone 40 mg daily for 5 days, PO Doxycycline to complete to complete total 10 days.

## 2024-07-09 NOTE — RESPIRATORY THERAPY
Patient receiving aerosol tx via duoneb now and q6hr w/a follow by 0.5mg pulmicort and 15mcg Brovana now and bid.  Hr 80 and 02 saturation 98% on nc at 2.5lpm.  Increased tx Q6hr around the clock.  Nurse notified.

## 2024-07-09 NOTE — PROGRESS NOTES
[facility] Medicine  Progress Note    Patient Name: Sondra Beltran  MRN: [id number]  Patient Class: OP- Observation   Admission Date: 7/8/2024  Length of Stay: 0 days  Attending Physician: Betsey Adams MD  Primary Care Provider: Zahra Scott MD        Subjective:     Principal Problem:Moderate asthma with exacerbation        HPI:  Sondra Beltran 54 year female who presents emergency room for evaluation of shortness a breath and wheezing.  She also endorses a nonproductive cough over the past several days.  She was seen in urgent care several days ago where she received steroids and a Z-Matt.  She has been hospitalized in the past for asthma but has never been intubated for asthma.  She has been using albuterol nebs without improvement.  Previous medical history includes immune deficiency, obesity, hypertension, and asthma.  She does not smoke.  ER workup: CBC unremarkable.  CMP with glucose of 204 otherwise unremarkable.  COVID test pending.  Chest x-ray was unremarkable.  Patient was given Solu-Medrol and DuoNebs with some improvement in symptoms.  Patient admitted to Hospital Medicine for treatment and management.  Patient placed on COPD pathway.                Overview/Hospital Course:  Patient is a 54 year female with history of asthma. Patient was admitted with acute hypoxic respiratory failure due to acute asthma exacerbation. Patient was started on steroids and breathing Rx.     Interval History: Patient is still having some difficulty of breathing, she is afebrile. No other overnight events.     Review of Systems  Objective:     Vital Signs (Most Recent):  Temp: 98 °F (36.7 °C) (07/09/24 1250)  Pulse: 110 (07/09/24 1250)  Resp: 20 (07/09/24 1250)  BP: 124/62 (07/09/24 1250)  SpO2: 98 % (07/09/24 1250) Vital Signs (24h Range):  Temp:  [97.5 °F (36.4 °C)-98.5 °F (36.9 °C)] 98 °F (36.7 °C)  Pulse:  [] 110  Resp:  [18-22] 20  SpO2:  [95 %-99 %] 98 %  BP:  (124-148)/(60-77) 124/62     Weight: 90.7 kg (200 lb)  Body mass index is 34.33 kg/m².    Intake/Output Summary (Last 24 hours) at 7/9/2024 1407  Last data filed at 7/9/2024 0800  Gross per 24 hour   Intake 663.27 ml   Output 1000 ml   Net -336.73 ml         Physical Exam  Vitals and nursing note reviewed.   Constitutional:       General: She is not in acute distress.     Appearance: She is well-developed. She is ill-appearing. She is not diaphoretic.   HENT:      Head: Normocephalic.      Mouth/Throat:      Mouth: Mucous membranes are moist.      Pharynx: Oropharynx is clear.   Eyes:      General: No scleral icterus.     Conjunctiva/sclera: Conjunctivae normal.      Pupils: Pupils are equal, round, and reactive to light.   Neck:      Vascular: No JVD.   Cardiovascular:      Rate and Rhythm: Normal rate and regular rhythm.      Heart sounds: Normal heart sounds. No murmur heard.     No friction rub. No gallop.   Pulmonary:      Effort: Respiratory distress present.      Breath sounds: scattered Wheezing present. No rales.   Abdominal:      General: Bowel sounds are normal. There is no distension.      Palpations: Abdomen is soft.      Tenderness: There is no abdominal tenderness. There is no guarding or rebound.   Musculoskeletal:         General: No tenderness. Normal range of motion.      Cervical back: Normal range of motion and neck supple.   Lymphadenopathy:      Cervical: No cervical adenopathy.   Skin:     General: Skin is warm and dry.      Capillary Refill: Capillary refill takes less than 2 seconds.      Coloration: Skin is not pale.      Findings: No erythema or rash.   Neurological:      Mental Status: She is alert and oriented to person, place, and time.      Cranial Nerves: No cranial nerve deficit.      Sensory: No sensory deficit.      Coordination: Coordination normal.      Deep Tendon Reflexes: Reflexes normal.   Psychiatric:         Behavior: Behavior normal.         Thought Content: Thought  content normal.         Judgment: Judgment normal.         Significant Labs: All pertinent labs within the past 24 hours have been reviewed.  CBC:   Recent Labs   Lab 07/08/24  1507 07/09/24  0411   WBC 6.53 8.23   HGB 13.4 13.1   HCT 41.4 39.9    187     CMP:   Recent Labs   Lab 07/08/24  1507 07/09/24  0411    142   K 4.2 4.5    111*   CO2 19* 20*   * 162*   BUN 15 13   CREATININE 0.8 0.7   CALCIUM 9.6 9.4   PROT 6.9  --    ALBUMIN 3.7  --    BILITOT 0.4  --    ALKPHOS 86  --    AST 20  --    ALT 28  --    ANIONGAP 13 11       Significant Imaging: I have reviewed all pertinent imaging results/findings within the past 24 hours.    Assessment/Plan:      * Moderate asthma with exacerbation  Acute problem   Patient's asthma is with exacerbation noted by continued dyspnea currently.  Patient is currently on COPD Pathway. Continue scheduled inhalers Steroids, Antibiotics, and Supplemental oxygen and monitor respiratory status closely.        Acute hypoxemic respiratory failure  Due to above   Wean oxygen       VTE Risk Mitigation (From admission, onward)           Ordered     IP VTE HIGH RISK PATIENT  Once         07/08/24 1621     Place sequential compression device  Until discontinued         07/08/24 1621     Place RADHA hose  Until discontinued         07/08/24 1621                    Discharge Planning   KHADIJAH: 7/11/2024     Code Status: Full Code   Is the patient medically ready for discharge?:     Reason for patient still in hospital (select all that apply): Treatment  Discharge Plan A: Home                  Betsey Adams MD  Department of Hospital Medicine   [facility]/Surg

## 2024-07-09 NOTE — SUBJECTIVE & OBJECTIVE
Interval History: Patient is still having some difficulty of breathing, she is afebrile. No other overnight events.     Review of Systems  Objective:     Vital Signs (Most Recent):  Temp: 98 °F (36.7 °C) (07/09/24 1250)  Pulse: 110 (07/09/24 1250)  Resp: 20 (07/09/24 1250)  BP: 124/62 (07/09/24 1250)  SpO2: 98 % (07/09/24 1250) Vital Signs (24h Range):  Temp:  [97.5 °F (36.4 °C)-98.5 °F (36.9 °C)] 98 °F (36.7 °C)  Pulse:  [] 110  Resp:  [18-22] 20  SpO2:  [95 %-99 %] 98 %  BP: (124-148)/(60-77) 124/62     Weight: 90.7 kg (200 lb)  Body mass index is 34.33 kg/m².    Intake/Output Summary (Last 24 hours) at 7/9/2024 1407  Last data filed at 7/9/2024 0800  Gross per 24 hour   Intake 663.27 ml   Output 1000 ml   Net -336.73 ml         Physical Exam  Vitals and nursing note reviewed.   Constitutional:       General: She is not in acute distress.     Appearance: She is well-developed. She is ill-appearing. She is not diaphoretic.   HENT:      Head: Normocephalic.      Mouth/Throat:      Mouth: Mucous membranes are moist.      Pharynx: Oropharynx is clear.   Eyes:      General: No scleral icterus.     Conjunctiva/sclera: Conjunctivae normal.      Pupils: Pupils are equal, round, and reactive to light.   Neck:      Vascular: No JVD.   Cardiovascular:      Rate and Rhythm: Normal rate and regular rhythm.      Heart sounds: Normal heart sounds. No murmur heard.     No friction rub. No gallop.   Pulmonary:      Effort: Respiratory distress present.      Breath sounds: scattered Wheezing present. No rales.   Abdominal:      General: Bowel sounds are normal. There is no distension.      Palpations: Abdomen is soft.      Tenderness: There is no abdominal tenderness. There is no guarding or rebound.   Musculoskeletal:         General: No tenderness. Normal range of motion.      Cervical back: Normal range of motion and neck supple.   Lymphadenopathy:      Cervical: No cervical adenopathy.   Skin:     General: Skin is warm  and dry.      Capillary Refill: Capillary refill takes less than 2 seconds.      Coloration: Skin is not pale.      Findings: No erythema or rash.   Neurological:      Mental Status: She is alert and oriented to person, place, and time.      Cranial Nerves: No cranial nerve deficit.      Sensory: No sensory deficit.      Coordination: Coordination normal.      Deep Tendon Reflexes: Reflexes normal.   Psychiatric:         Behavior: Behavior normal.         Thought Content: Thought content normal.         Judgment: Judgment normal.         Significant Labs: All pertinent labs within the past 24 hours have been reviewed.  CBC:   Recent Labs   Lab 07/08/24  1507 07/09/24  0411   WBC 6.53 8.23   HGB 13.4 13.1   HCT 41.4 39.9    187     CMP:   Recent Labs   Lab 07/08/24  1507 07/09/24  0411    142   K 4.2 4.5    111*   CO2 19* 20*   * 162*   BUN 15 13   CREATININE 0.8 0.7   CALCIUM 9.6 9.4   PROT 6.9  --    ALBUMIN 3.7  --    BILITOT 0.4  --    ALKPHOS 86  --    AST 20  --    ALT 28  --    ANIONGAP 13 11       Significant Imaging: I have reviewed all pertinent imaging results/findings within the past 24 hours.

## 2024-07-09 NOTE — CARE UPDATE
07/08/24 1915   Patient Assessment/Suction   Level of Consciousness (AVPU) alert   Respiratory Effort Mild;Labored   Expansion/Accessory Muscles/Retractions accessory muscle use   All Lung Fields Breath Sounds clear   Cough Frequency frequent   Cough Type nonproductive   PRE-TX-O2   Device (Oxygen Therapy) nasal cannula   $ Is the patient on Low Flow Oxygen? Yes   Flow (L/min) (Oxygen Therapy) 2.5   SpO2 98 %   Pulse Oximetry Type Intermittent   Pulse 92   Resp (!) 21   Aerosol Therapy   $ Aerosol Therapy Charges Aerosol Treatment   Respiratory Treatment Status (SVN) given   Treatment Route (SVN) mask;oxygen   Patient Position HOB elevated   Signs of Intolerance (SVN) none   Breath Sounds Post-Respiratory Treatment   Throughout All Fields Post-Treatment All Fields   Throughout All Fields Post-Treatment no change   Post-treatment Heart Rate (beats/min) 91   Post-treatment Resp Rate (breaths/min) 18

## 2024-07-09 NOTE — PLAN OF CARE
Problem: COPD (Chronic Obstructive Pulmonary Disease)  Goal: Optimal Chronic Illness Coping  Outcome: Progressing  Goal: Optimal Level of Functional [place]  Outcome: Progressing  Goal: Absence of Infection Signs and Symptoms  Outcome: Progressing  Goal: Improved Oral Intake  Outcome: Progressing  Goal: Effective Oxygenation and Ventilation  Outcome: Progressing     Problem: Adult Inpatient Plan of Care  Goal: Plan of Care Review  Outcome: Progressing  Goal: Patient-Specific Goal (Individualized)  Outcome: Progressing  Goal: Absence of Hospital-Acquired Illness or Injury  Outcome: Progressing  Goal: Optimal Comfort and Wellbeing  Outcome: Progressing  Goal: Readiness for Transition of Care  Outcome: Progressing     Problem: Fall Injury Risk  Goal: Absence of Fall and Fall-Related Injury  Outcome: Progressing

## 2024-07-09 NOTE — PLAN OF CARE
[facility] - Med/Surg  Initial Discharge Assessment       Primary Care Provider: Zahra Scott MD    Admission Diagnosis: Moderate persistent asthma with exacerbation [J45.41]    Admission Date: 7/8/2024  Expected Discharge Date: 7/11/2024    Spoke to pt at bedside to complete dc assessment. Verified facesheet. Pt lives alone, independent, drives self. PCP [place]. Pharmacy [facility]. DME neb. Denies hd/hh/dm/coumadin. Pt without recent admit. Pt to drive self home. CM to follow for dc needs      Transition of Care Barriers: None    Payor: [employer] / Plan: [facility] CHOICE PLUS / Product Type: Commercial /     Extended Emergency Contact Information  Primary Emergency Contact: Sandra Beltran  Address: [address] [place] [address]           [place] [address] [facility]  Mobile Phone: [phone]  Relation: Daughter  Preferred language: English   needed? No    Discharge Plan A: Home  Discharge Plan B: Home      [id number] - [place], [place] - [address]  [address]  [place] [address]  Phone: [phone] Fax: [phone]    [facility]/pharmacy #[id number] - [place], [place] - [address] AT [facility]  [address]  [place] [address]  Phone: [phone] Fax: [phone]      Initial Assessment (most recent)       Adult Discharge Assessment - 07/09/24 1210          Discharge Assessment    Assessment Type Discharge Planning Assessment     Confirmed/corrected address, phone number and insurance Yes     Confirmed Demographics Correct on Facesheet     Source of Information patient     People in Home alone     Do you expect to return to your current living situation? Yes     Prior to hospitilization cognitive status: Alert/Oriented     Current cognitive status: Alert/Oriented     Walking or Climbing Stairs Difficulty no     Dressing/Bathing Difficulty no     Equipment Currently Used at Home nebulizer     Readmission within 30 days? No     Patient currently being  followed by outpatient case management? No     Do you currently have service(s) that help you manage your care at home? No     Do you take prescription medications? Yes     Do you have prescription coverage? Yes     Do you have any problems affording any of your prescribed medications? No     Is the patient taking medications as prescribed? yes     How do you get to doctors appointments? car, drives self     Are you on dialysis? No     Do you take coumadin? No     Discharge Plan A Home     Discharge Plan B Home     DME Needed Upon Discharge  none     Discharge Plan discussed with: Patient     Transition of Care Barriers None

## 2024-07-09 NOTE — NURSING
Nurses Note -- 4 Eyes      7/8/2024 1920        Skin assessed during: Q Shift Change      [x] No Altered Skin Integrity Present    []Prevention Measures Documented      [] Yes- Altered Skin Integrity Present or Discovered   [] LDA Added if Not in Epic (Describe Wound)   [] New Altered Skin Integrity was Present on Admit and Documented in LDA   [] Wound Image Taken    Wound Care Consulted? No    Attending Nurse:  Wendi Lujan RN/Staff Member:  Adelaide

## 2024-07-10 VITALS
TEMPERATURE: 98 F | RESPIRATION RATE: 20 BRPM | BODY MASS INDEX: 34.15 KG/M2 | SYSTOLIC BLOOD PRESSURE: 146 MMHG | DIASTOLIC BLOOD PRESSURE: 70 MMHG | WEIGHT: 200 LBS | HEART RATE: 106 BPM | OXYGEN SATURATION: 94 % | HEIGHT: 64 IN

## 2024-07-10 LAB
ANION GAP SERPL CALC-SCNC: 12 MMOL/L (ref 8–16)
BASOPHILS # BLD AUTO: 0.01 K/UL (ref 0–0.2)
BASOPHILS NFR BLD: 0.1 % (ref 0–1.9)
BUN SERPL-MCNC: 21 MG/DL (ref 6–20)
CALCIUM SERPL-MCNC: 9.7 MG/DL (ref 8.7–10.5)
CHLORIDE SERPL-SCNC: 108 MMOL/L (ref 95–110)
CO2 SERPL-SCNC: 21 MMOL/L (ref 23–29)
CREAT SERPL-MCNC: 0.8 MG/DL (ref 0.5–1.4)
DIFFERENTIAL METHOD BLD: ABNORMAL
EOSINOPHIL # BLD AUTO: 0 K/UL (ref 0–0.5)
EOSINOPHIL NFR BLD: 0 % (ref 0–8)
ERYTHROCYTE [DISTWIDTH] IN BLOOD BY AUTOMATED COUNT: 12.6 % (ref 11.5–14.5)
EST. GFR  (NO RACE VARIABLE): >60 ML/MIN/1.73 M^2
GLUCOSE SERPL-MCNC: 159 MG/DL (ref 70–110)
HCT VFR BLD AUTO: 39.5 % (ref 37–48.5)
HGB BLD-MCNC: 13 G/DL (ref 12–16)
IMM GRANULOCYTES # BLD AUTO: 0.08 K/UL (ref 0–0.04)
IMM GRANULOCYTES NFR BLD AUTO: 0.6 % (ref 0–0.5)
LYMPHOCYTES # BLD AUTO: 1.2 K/UL (ref 1–4.8)
LYMPHOCYTES NFR BLD: 8.9 % (ref 18–48)
MAGNESIUM SERPL-MCNC: 2 MG/DL (ref 1.6–2.6)
MCH RBC QN AUTO: 30.2 PG (ref 27–31)
MCHC RBC AUTO-ENTMCNC: 32.9 G/DL (ref 32–36)
MCV RBC AUTO: 92 FL (ref 82–98)
MONOCYTES # BLD AUTO: 0.3 K/UL (ref 0.3–1)
MONOCYTES NFR BLD: 2 % (ref 4–15)
NEUTROPHILS # BLD AUTO: 12.1 K/UL (ref 1.8–7.7)
NEUTROPHILS NFR BLD: 88.4 % (ref 38–73)
NRBC BLD-RTO: 0 /100 WBC
PHOSPHATE SERPL-MCNC: 3.5 MG/DL (ref 2.7–4.5)
PLATELET # BLD AUTO: 213 K/UL (ref 150–450)
PMV BLD AUTO: 11.8 FL (ref 9.2–12.9)
POCT GLUCOSE: 169 MG/DL (ref 70–110)
POCT GLUCOSE: 176 MG/DL (ref 70–110)
POTASSIUM SERPL-SCNC: 4.1 MMOL/L (ref 3.5–5.1)
RBC # BLD AUTO: 4.31 M/UL (ref 4–5.4)
SODIUM SERPL-SCNC: 141 MMOL/L (ref 136–145)
WBC # BLD AUTO: 13.63 K/UL (ref 3.9–12.7)

## 2024-07-10 PROCEDURE — 25000003 PHARM REV CODE 250: Performed by: NURSE PRACTITIONER

## 2024-07-10 PROCEDURE — 80048 BASIC METABOLIC PNL TOTAL CA: CPT | Performed by: NURSE PRACTITIONER

## 2024-07-10 PROCEDURE — 94640 AIRWAY INHALATION TREATMENT: CPT | Mod: XB

## 2024-07-10 PROCEDURE — 25000003 PHARM REV CODE 250

## 2024-07-10 PROCEDURE — 36415 COLL VENOUS BLD VENIPUNCTURE: CPT | Performed by: NURSE PRACTITIONER

## 2024-07-10 PROCEDURE — 84100 ASSAY OF PHOSPHORUS: CPT | Performed by: NURSE PRACTITIONER

## 2024-07-10 PROCEDURE — 85025 COMPLETE CBC W/AUTO DIFF WBC: CPT | Performed by: NURSE PRACTITIONER

## 2024-07-10 PROCEDURE — G0378 HOSPITAL OBSERVATION PER HR: HCPCS

## 2024-07-10 PROCEDURE — 83735 ASSAY OF MAGNESIUM: CPT | Performed by: NURSE PRACTITIONER

## 2024-07-10 PROCEDURE — 99900031 HC PATIENT EDUCATION (STAT)

## 2024-07-10 PROCEDURE — 25000242 PHARM REV CODE 250 ALT 637 W/ HCPCS: Performed by: INTERNAL MEDICINE

## 2024-07-10 PROCEDURE — 96376 TX/PRO/DX INJ SAME DRUG ADON: CPT

## 2024-07-10 PROCEDURE — 94761 N-INVAS EAR/PLS OXIMETRY MLT: CPT

## 2024-07-10 PROCEDURE — 25000242 PHARM REV CODE 250 ALT 637 W/ HCPCS: Performed by: NURSE PRACTITIONER

## 2024-07-10 PROCEDURE — 63600175 PHARM REV CODE 636 W HCPCS: Performed by: NURSE PRACTITIONER

## 2024-07-10 PROCEDURE — 99900035 HC TECH TIME PER 15 MIN (STAT)

## 2024-07-10 RX ORDER — DOXYCYCLINE HYCLATE 100 MG
100 TABLET ORAL EVERY 12 HOURS
Qty: 16 TABLET | Refills: 0 | Status: SHIPPED | OUTPATIENT
Start: 2024-07-10 | End: 2024-07-18

## 2024-07-10 RX ORDER — BENZONATATE 100 MG/1
100 CAPSULE ORAL 3 TIMES DAILY PRN
Qty: 30 CAPSULE | Refills: 0 | Status: SHIPPED | OUTPATIENT
Start: 2024-07-10 | End: 2024-07-20

## 2024-07-10 RX ORDER — PREDNISONE 20 MG/1
40 TABLET ORAL DAILY
Qty: 10 TABLET | Refills: 0 | Status: SHIPPED | OUTPATIENT
Start: 2024-07-10 | End: 2024-07-15

## 2024-07-10 RX ADMIN — BENZONATATE 100 MG: 100 CAPSULE ORAL at 12:07

## 2024-07-10 RX ADMIN — INSULIN ASPART 2 UNITS: 100 INJECTION, SOLUTION INTRAVENOUS; SUBCUTANEOUS at 11:07

## 2024-07-10 RX ADMIN — IPRATROPIUM BROMIDE AND ALBUTEROL SULFATE 3 ML: .5; 3 SOLUTION RESPIRATORY (INHALATION) at 04:07

## 2024-07-10 RX ADMIN — GUAIFENESIN 200 MG: 200 SOLUTION ORAL at 08:07

## 2024-07-10 RX ADMIN — DOXYCYCLINE HYCLATE 100 MG: 100 TABLET, COATED ORAL at 08:07

## 2024-07-10 RX ADMIN — ARFORMOTEROL TARTRATE 15 MCG: 15 SOLUTION RESPIRATORY (INHALATION) at 07:07

## 2024-07-10 RX ADMIN — INSULIN ASPART 2 UNITS: 100 INJECTION, SOLUTION INTRAVENOUS; SUBCUTANEOUS at 08:07

## 2024-07-10 RX ADMIN — ACETAMINOPHEN 650 MG: 325 TABLET ORAL at 06:07

## 2024-07-10 RX ADMIN — GUAIFENESIN 200 MG: 200 SOLUTION ORAL at 03:07

## 2024-07-10 RX ADMIN — BENZONATATE 100 MG: 100 CAPSULE ORAL at 03:07

## 2024-07-10 RX ADMIN — BUDESONIDE INHALATION 0.5 MG: 0.5 SUSPENSION RESPIRATORY (INHALATION) at 07:07

## 2024-07-10 RX ADMIN — METHYLPREDNISOLONE SODIUM SUCCINATE 80 MG: 40 INJECTION, POWDER, FOR SOLUTION INTRAMUSCULAR; INTRAVENOUS at 06:07

## 2024-07-10 RX ADMIN — MONTELUKAST 10 MG: 10 TABLET, FILM COATED ORAL at 08:07

## 2024-07-10 RX ADMIN — IPRATROPIUM BROMIDE AND ALBUTEROL SULFATE 3 ML: .5; 3 SOLUTION RESPIRATORY (INHALATION) at 12:07

## 2024-07-10 RX ADMIN — IPRATROPIUM BROMIDE AND ALBUTEROL SULFATE 3 ML: .5; 3 SOLUTION RESPIRATORY (INHALATION) at 07:07

## 2024-07-10 NOTE — ASSESSMENT & PLAN NOTE
Resume home nebulizer and breathing Rx  PO Prednisone 40 mg daily for 5 days   PO Doxycycline to complete 10 days

## 2024-07-10 NOTE — PLAN OF CARE
Plan of care reviewed with patient. Verbalized understanding. IV intact and patent. O2 in place. Glucose monitored and covered with sliding scale insulin as needed. Tele in place and being monitored. Cough managed with prn medications. Safety maintained. Call light in reach and instructed to call for assistance. Will continue to monitor.

## 2024-07-10 NOTE — PLAN OF CARE
Patient cleared for discharge from case management standpoint.    Hospital follow up scheduled and placed on AVS.  Patient does not qualify for oxygen.       07/10/24 1200   Final Note   Assessment Type Final Discharge Note   Anticipated Discharge Disposition Home   What phone number can be called within the next 1-3 days to see how you are doing after discharge? [phone]   Hospital Resources/Appts/Education Provided Provided patient/caregiver with written discharge plan information;Provided education on problems/symptoms using teachback;Appointments scheduled and added to AVS   Post-Acute Status   Post-Acute Authorization Other   Other Status No Post-Acute Service Needs   Discharge Delays None known at this time

## 2024-07-10 NOTE — CARE UPDATE
07/10/24 0655   Patient Assessment/Suction   Level of Consciousness (AVPU) alert   Respiratory Effort Normal;Unlabored   Expansion/Accessory Muscles/Retractions no use of accessory muscles;no retractions;expansion symmetric   All Lung Fields Breath Sounds Posterior:;Anterior:;clear   OLENA Breath Sounds clear   LLL Breath Sounds clear   RUL Breath Sounds clear   RML Breath Sounds clear   RLL Breath Sounds clear   Rhythm/Pattern, Respiratory unlabored;pattern regular;depth regular   Cough Frequency frequent   Cough Type dry;nonproductive   PRE-TX-O2   Device (Oxygen Therapy) room air   SpO2 97 %   Pulse Oximetry Type Intermittent   $ Pulse Oximetry - Multiple Charge Pulse Oximetry - Multiple   Pulse 94   Resp 16   Positioning   Head of Bed (HOB) Positioning HOB elevated   Aerosol Therapy   $ Aerosol Therapy Charges Aerosol Treatment   Daily Review of Necessity (SVN) completed   Respiratory Treatment Status (SVN) given   Treatment Route (SVN) mask;oxygen   Patient Position HOB elevated   Post Treatment Assessment (SVN) breath sounds unchanged   Signs of Intolerance (SVN) none   Breath Sounds Post-Respiratory Treatment   Throughout All Fields Post-Treatment All Fields   Throughout All Fields Post-Treatment aeration increased   Post-treatment Heart Rate (beats/min) 98   Post-treatment Resp Rate (breaths/min) 18   Education   $ Education Bronchodilator;15 min

## 2024-07-10 NOTE — CARE UPDATE
07/10/24 1047   Patient Assessment/Suction   Level of Consciousness (AVPU) alert   Respiratory Effort Normal;Unlabored   Expansion/Accessory Muscles/Retractions no use of accessory muscles;no retractions;expansion symmetric   All Lung Fields Breath Sounds Anterior:;Posterior:;clear;diminished   OLENA Breath Sounds clear   LLL Breath Sounds diminished;clear   RUL Breath Sounds clear;wheezes, expiratory;diminished   RML Breath Sounds diminished;wheezes, expiratory   RLL Breath Sounds diminished;wheezes, expiratory   Rhythm/Pattern, Respiratory pattern regular;unlabored;depth regular   Cough Frequency frequent   Cough Type dry;nonproductive   PRE-TX-O2   Device (Oxygen Therapy) room air   SpO2 97 %   Pulse Oximetry Type Intermittent   Pulse 101   Resp 18   Positioning   Head of Bed (HOB) Positioning HOB elevated   Aerosol Therapy   $ Aerosol Therapy Charges Aerosol Treatment   Respiratory Treatment Status (SVN) given   Treatment Route (SVN) mask;oxygen   Patient Position HOB elevated   Post Treatment Assessment (SVN) breath sounds improved;wheezing decreased   Signs of Intolerance (SVN) none   Breath Sounds Post-Respiratory Treatment   Throughout All Fields Post-Treatment All Fields   Throughout All Fields Post-Treatment aeration increased   Post-treatment Heart Rate (beats/min) 95   Post-treatment Resp Rate (breaths/min) 18   Education   $ Education Bronchodilator;15 min   Home Oxygen Qualification   $ Home O2 Qualification Tech time 15 minutes   Room Air SpO2 At Rest 97 %   Room Air SpO2 During Ambulation 96 %   SpO2 During Ambulation on O2   (No O2 Req.)   Heart Rate on O2 100 bpm   SpO2 Post Ambulation 97 %   Post Ambulation Heart Rate 101 bpm   Post Ambulation O2 LPM   (No O2 req.)   Home O2 Eval Comments   (Patient does not qualify for home oxygen. SPO2 did not drop below 96% throughout qualification.)

## 2024-07-10 NOTE — DISCHARGE SUMMARY
[facility]  Discharge Summary      Patient Name: Sondra Beltran  MRN: [id number]  GARY: [id number]  Patient Class: OP- Observation  Admission Date: 7/8/2024  Hospital Length of Stay: 0 days  Discharge Date and Time:  07/10/2024 10:36 AM  Attending Physician: Betsey Adams MD   Discharging Provider: Betsey Adams MD  Primary Care Provider: Zahra Scott MD    Primary Care Team: Networked reference to record PCT     HPI:   Sondra Beltran 54 year female who presents emergency room for evaluation of shortness a breath and wheezing.  She also endorses a nonproductive cough over the past several days.  She was seen in urgent care several days ago where she received steroids and a Z-Matt.  She has been hospitalized in the past for asthma but has never been intubated for asthma.  She has been using albuterol nebs without improvement.  Previous medical history includes immune deficiency, obesity, hypertension, and asthma.  She does not smoke.  ER workup: CBC unremarkable.  CMP with glucose of 204 otherwise unremarkable.  COVID test pending.  Chest x-ray was unremarkable.  Patient was given Solu-Medrol and DuoNebs with some improvement in symptoms.  Patient admitted to Hospital Medicine for treatment and management.  Patient placed on COPD pathway.                * No surgery found *      Hospital Course:   Patient is a 54 year female with history of asthma. Patient was admitted with acute hypoxic respiratory failure due to acute asthma exacerbation. Patient was started on steroids and breathing Rx. Patient is weaned off to RA today. She is feeling improved. No wheezing was heard. Home oxygen eval was done. DC with PO Prednisone 40 mg daily for 5 days, PO Doxycycline to complete to complete total 10 days.      Goals of Care Treatment Preferences:  Code Status: Full Code      Physical Exam  Vitals and nursing note reviewed.   Constitutional:       General: She is not  in acute distress.     Appearance: She is well-developed. She is ill-appearing. She is not diaphoretic.   HENT:      Head: Normocephalic.      Mouth/Throat:      Mouth: Mucous membranes are moist.      Pharynx: Oropharynx is clear.   Eyes:      General: No scleral icterus.     Conjunctiva/sclera: Conjunctivae normal.      Pupils: Pupils are equal, round, and reactive to light.   Neck:      Vascular: No JVD.   Cardiovascular:      Rate and Rhythm: Normal rate and regular rhythm.      Heart sounds: Normal heart sounds. No murmur heard.     No friction rub. No gallop.   Pulmonary:      Effort: Respiratory distress present.      Breath sounds: scattered Wheezing present. No rales.   Abdominal:      General: Bowel sounds are normal. There is no distension.      Palpations: Abdomen is soft.      Tenderness: There is no abdominal tenderness. There is no guarding or rebound.   Musculoskeletal:         General: No tenderness. Normal range of motion.      Cervical back: Normal range of motion and neck supple.   Lymphadenopathy:      Cervical: No cervical adenopathy.   Skin:     General: Skin is warm and dry.      Capillary Refill: Capillary refill takes less than 2 seconds.      Coloration: Skin is not pale.      Findings: No erythema or rash.   Neurological:      Mental Status: She is alert and oriented to person, place, and time.      Cranial Nerves: No cranial nerve deficit.      Sensory: No sensory deficit.      Coordination: Coordination normal.      Deep Tendon Reflexes: Reflexes normal.   Psychiatric:         Behavior: Behavior normal.         Thought Content: Thought content normal.         Judgment: Judgment normal.     Consults:     Pulmonary  * Moderate asthma with exacerbation  Resume home nebulizer and breathing Rx  PO Prednisone 40 mg daily for 5 days   PO Doxycycline to complete 10 days       Acute hypoxemic respiratory failure  Resolved       Final Active Diagnoses:    Diagnosis Date Noted POA    PRINCIPAL  PROBLEM:  Moderate asthma with exacerbation [J45.901] 03/23/2016 Yes    Acute hypoxemic respiratory failure [J96.01] 07/08/2024 Yes      Problems Resolved During this Admission:       Discharged Condition: good    Disposition: Home or Self Care    Follow Up:   Follow-up Information       Zahra Scott MD Follow up in 1 week(s).    Specialty: Family Medicine  Contact information:  [place] [address] [phone]                           Patient Instructions:   No discharge procedures on file.    Significant Diagnostic Studies: Labs: CMP   Recent Labs   Lab 07/08/24  1507 07/09/24  0411 07/10/24  0406    142 141   K 4.2 4.5 4.1    111* 108   CO2 19* 20* 21*   * 162* 159*   BUN 15 13 21*   CREATININE 0.8 0.7 0.8   CALCIUM 9.6 9.4 9.7   PROT 6.9  --   --    ALBUMIN 3.7  --   --    BILITOT 0.4  --   --    ALKPHOS 86  --   --    AST 20  --   --    ALT 28  --   --    ANIONGAP 13 11 12    and CBC   Recent Labs   Lab 07/08/24  1507 07/09/24  0411 07/10/24  0406   WBC 6.53 8.23 13.63*   HGB 13.4 13.1 13.0   HCT 41.4 39.9 39.5    187 213       Pending Diagnostic Studies:       None           Medications:  Reconciled Home Medications:      Medication List        START taking these medications      benzonatate 100 MG capsule  Commonly known as: TESSALON  Take 1 capsule (100 mg total) by mouth 3 (three) times daily as needed for Cough.     doxycycline 100 MG tablet  Commonly known as: VIBRA-TABS  Take 1 tablet (100 mg total) by mouth every 12 (twelve) hours. for 8 days            CONTINUE taking these medications      * INV albuterol 90 mcg inhalation  Inhale into the lungs as needed. Take one puff by mouth as directed by Physician. Investigational Medication. Patient Study#    .     * albuterol 1.25 mg/3 mL Nebu  Commonly known as: ACCUNEB  Inhale 6 mL 3 times a day by inhalation route.     ascorbic acid (vitamin C) 500 MG tablet  Commonly known as: VITAMIN C  Take 500 mg by mouth 3  (three) times daily.     azelastine 137 mcg (0.1 %) nasal spray  Commonly known as: ASTELIN  SMARTSIG:Both Nares  Strength: 137 mcg (0.1 %)     budesonide-formoterol 160-4.5 mcg 160-4.5 mcg/actuation Hfaa  Commonly known as: SYMBICORT  Inhale 2 puffs into the lungs every 12 (twelve) hours. Controller     fluticasone propionate 50 mcg/actuation nasal spray  Commonly known as: FLONASE  2 sprays (100 mcg total) by Each Nostril route 2 (two) times daily.     guaiFENesin 600 mg 12 hr tablet  Commonly known as: MUCINEX  Take 2 tablets (1,200 mg total) by mouth 2 (two) times daily.     ketoconazole 2 % cream  Commonly known as: NIZORAL  AAA bid     * levocetirizine 5 MG tablet  Commonly known as: XYZAL  Take 5 mg by mouth every evening.     * levocetirizine 5 MG tablet  Commonly known as: XYZAL  Take 1 tablet (5 mg total) by mouth every evening.     montelukast 10 mg tablet  Commonly known as: SINGULAIR  Take 10 mg by mouth.     nystatin powder  Commonly known as: MYCOSTATIN  Apply topically 2 (two) times daily.     olmesartan 5 MG Tab  Commonly known as: BENICAR  Take 1 tablet by mouth once daily     predniSONE 20 MG tablet  Commonly known as: DELTASONE  Take 2 tablets (40 mg total) by mouth once daily. for 5 days     * promethazine-dextromethorphan 6.25-15 mg/5 mL Syrp  Commonly known as: PROMETHAZINE-DM  SMARTSI-10 Milliliter(s) By Mouth Every 6 Hours PRN     * promethazine-dextromethorphan 6.25-15 mg/5 mL Syrp  Commonly known as: PROMETHAZINE-DM  Take 5 mLs by mouth every 4 (four) hours as needed.     selenium 50 mcg Tab  Take 200 mcg by mouth once daily.     SPIRIVA RESPIMAT 1.25 mcg/actuation inhaler  Generic drug: tiotropium bromide  Inhale 2 puffs into the lungs once daily. Controller     traMADoL 50 mg tablet  Commonly known as: ULTRAM  Take 1 tablet (50 mg total) by mouth every 6 (six) hours as needed for Pain.     valACYclovir 1000 MG tablet  Commonly known as: VALTREX  Take 2 tablets (2,000 mg total) by  mouth every 12 (twelve) hours.           * This list has 6 medication(s) that are the same as other medications prescribed for you. Read the directions carefully, and ask your doctor or other care provider to review them with you.                STOP taking these medications      azithromycin 250 MG tablet  Commonly known as: Z-KEO              Indwelling Lines/Drains at time of discharge:   Lines/Drains/Airways       None                   Time spent on the discharge of patient: 40 minutes         Betsey Adams MD  Department of Hospital Medicine  [facility]/Surg

## 2024-07-10 NOTE — CARE UPDATE
07/09/24 1955   Patient Assessment/Suction   Level of Consciousness (AVPU) alert   Respiratory Effort Normal;Unlabored   Expansion/Accessory Muscles/Retractions no retractions;no use of accessory muscles;other (see comments)   All Lung Fields Breath Sounds equal bilaterally;diminished   Cough Frequency frequent   Cough Type nonproductive;dry;good   PRE-TX-O2   Device (Oxygen Therapy) nasal cannula   Flow (L/min) (Oxygen Therapy) 2.5   SpO2 97 %   Pulse Oximetry Type Intermittent   Pulse 98   Resp 20   Aerosol Therapy   $ Aerosol Therapy Charges Aerosol Treatment   Respiratory Treatment Status (SVN) given   Treatment Route (SVN) mask   Patient Position semi-Gant's   Signs of Intolerance (SVN) none   Breath Sounds Post-Respiratory Treatment   Throughout All Fields Post-Treatment All Fields   Throughout All Fields Post-Treatment aeration increased   Post-treatment Heart Rate (beats/min) 100   Post-treatment Resp Rate (breaths/min) 20

## 2024-07-16 ENCOUNTER — OFFICE VISIT (OUTPATIENT)
Dept: FAMILY MEDICINE | Facility: CLINIC | Age: 54
End: 2024-07-16
Payer: COMMERCIAL

## 2024-07-16 ENCOUNTER — PATIENT OUTREACH (OUTPATIENT)
Dept: ADMINISTRATIVE | Facility: CLINIC | Age: 54
End: 2024-07-16
Payer: COMMERCIAL

## 2024-07-16 VITALS
RESPIRATION RATE: 18 BRPM | HEART RATE: 92 BPM | HEIGHT: 64 IN | DIASTOLIC BLOOD PRESSURE: 68 MMHG | WEIGHT: 211.88 LBS | SYSTOLIC BLOOD PRESSURE: 130 MMHG | OXYGEN SATURATION: 97 % | BODY MASS INDEX: 36.17 KG/M2

## 2024-07-16 DIAGNOSIS — B00.1 FEVER BLISTER: ICD-10-CM

## 2024-07-16 DIAGNOSIS — J45.51 SEVERE PERSISTENT ASTHMA WITH (ACUTE) EXACERBATION: ICD-10-CM

## 2024-07-16 DIAGNOSIS — I10 BENIGN ESSENTIAL HTN: ICD-10-CM

## 2024-07-16 DIAGNOSIS — Z09 HOSPITAL DISCHARGE FOLLOW-UP: Primary | ICD-10-CM

## 2024-07-16 PROCEDURE — 3044F HG A1C LEVEL LT 7.0%: CPT | Mod: CPTII,S$GLB,,

## 2024-07-16 PROCEDURE — 3078F DIAST BP <80 MM HG: CPT | Mod: CPTII,S$GLB,,

## 2024-07-16 PROCEDURE — 3075F SYST BP GE 130 - 139MM HG: CPT | Mod: CPTII,S$GLB,,

## 2024-07-16 PROCEDURE — 1160F RVW MEDS BY RX/DR IN RCRD: CPT | Mod: CPTII,S$GLB,,

## 2024-07-16 PROCEDURE — 99999 PR PBB SHADOW E&M-EST. PATIENT-LVL V: CPT | Mod: PBBFAC,,,

## 2024-07-16 PROCEDURE — 4010F ACE/ARB THERAPY RXD/TAKEN: CPT | Mod: CPTII,S$GLB,,

## 2024-07-16 PROCEDURE — 99214 OFFICE O/P EST MOD 30 MIN: CPT | Mod: S$GLB,,,

## 2024-07-16 PROCEDURE — 1159F MED LIST DOCD IN RCRD: CPT | Mod: CPTII,S$GLB,,

## 2024-07-16 RX ORDER — VALACYCLOVIR HYDROCHLORIDE 1 G/1
2000 TABLET, FILM COATED ORAL EVERY 12 HOURS
Qty: 4 TABLET | Refills: 2 | Status: SHIPPED | OUTPATIENT
Start: 2024-07-16

## 2024-07-16 RX ORDER — OLMESARTAN MEDOXOMIL 5 MG/1
5 TABLET ORAL DAILY
Qty: 90 TABLET | Refills: 3 | Status: SHIPPED | OUTPATIENT
Start: 2024-07-16

## 2024-07-16 NOTE — PROGRESS NOTES
Transitional Care Note  Admit date: 07/08/2024  Discharge date: 07/10/2024  Date of interactive contact (2 business days post D/C): 07/16/2024  Hospitalized at: [facility]  Discharge diagnoses: Moderate asthma with exacerbation, acute hypoxemic resp failure     Family and/or Caretaker present at visit?  No.  Diagnostic tests reviewed/disposition: I have reviewed all completed as well as pending diagnostic tests at the time of discharge.  Disease/illness education: As below  Medication changes: As below  Home health/community services discussion/referrals: Patient does not have home health established from hospital visit.  They do not need home health.  If needed, we will set up home health for the patient.   Establishment or re-establishment of referral orders for community resources: No other necessary community resources.   Discussion with other health care providers: No discussion with other health care providers necessary.                       Subjective:       Patient ID: Sondra Beltran is a 54 y.o. female.    Chief Complaint: Hospital Follow Up    Hospital discharge follow up. Established with pulmonology. Dr. Sood. Plans to schedule follow up this week. Still short winded since discharge but overall stable. Taking meds as prescribed at discharge. She requests for me to complete FMLA paperwork. Plan to return next Monday.     Rx refill HTN meds and cold sore med.         HPI:   Sondra Beltran 54 year female who presents emergency room for evaluation of shortness a breath and wheezing.  She also endorses a nonproductive cough over the past several days.  She was seen in urgent care several days ago where she received steroids and a Z-Matt.  She has been hospitalized in the past for asthma but has never been intubated for asthma.  She has been using albuterol nebs without improvement.  Previous medical history includes immune deficiency, obesity, hypertension, and asthma.  She does not smoke.  ER workup: CBC  unremarkable.  CMP with glucose of 204 otherwise unremarkable.  COVID test pending.  Chest x-ray was unremarkable.  Patient was given Solu-Medrol and DuoNebs with some improvement in symptoms.  Patient admitted to Hospital Medicine for treatment and management.  Patient placed on COPD pathway.                   * No surgery found *       Hospital Course:   Patient is a 54 year female with history of asthma. Patient was admitted with acute hypoxic respiratory failure due to acute asthma exacerbation. Patient was started on steroids and breathing Rx. Patient is weaned off to RA today. She is feeling improved. No wheezing was heard. Home oxygen eval was done. DC with PO Prednisone 40 mg daily for 5 days, PO Doxycycline to complete to complete total 10 days.          Past Medical History:   Diagnosis Date    Asthma     Last ER visit approx 2010. usually after illness    Benign essential HTN 06/19/2017    CVID (common variable immunodeficiency)     Neck strain 05/17/2016    Thyroid nodule 06/28/2017       Review of patient's allergies indicates:   Allergen Reactions    Sulfamethoxazole-trimethoprim Rash and Hives         Current Outpatient Medications:     albuterol (ACCUNEB) 1.25 mg/3 mL Nebu, Inhale 6 mL 3 times a day by inhalation route., Disp: , Rfl:     albuterol sulfate (INV ALBUTEROL) 90 mcg inhalation, Inhale into the lungs as needed. Take one puff by mouth as directed by Physician. Investigational Medication. Patient Study#    ., Disp: , Rfl:     ascorbic acid, vitamin C, (VITAMIN C) 500 MG tablet, Take 500 mg by mouth 3 (three) times daily., Disp: , Rfl:     azelastine (ASTELIN) 137 mcg (0.1 %) nasal spray, SMARTSIG:Both Nares Strength: 137 mcg (0.1 %) (Patient taking differently: 1 spray Daily. SMARTSIG:Both Nares Strength: 137 mcg (0.1 %)), Disp: 30 mL, Rfl: 3    benzonatate (TESSALON) 100 MG capsule, Take 1 capsule (100 mg total) by mouth 3 (three) times daily as needed for Cough., Disp: 30 capsule, Rfl: 0     budesonide-formoterol 160-4.5 mcg (SYMBICORT) 160-4.5 mcg/actuation HFAA, Inhale 2 puffs into the lungs every 12 (twelve) hours. Controller, Disp: 6 g, Rfl: 3    doxycycline (VIBRA-TABS) 100 MG tablet, Take 1 tablet (100 mg total) by mouth every 12 (twelve) hours. for 8 days, Disp: 16 tablet, Rfl: 0    fluticasone propionate (FLONASE) 50 mcg/actuation nasal spray, 2 sprays (100 mcg total) by Each Nostril route 2 (two) times daily., Disp: 9.9 g, Rfl: 3    guaiFENesin (MUCINEX) 600 mg 12 hr tablet, Take 2 tablets (1,200 mg total) by mouth 2 (two) times daily., Disp: 30 tablet, Rfl: 0    levocetirizine (XYZAL) 5 MG tablet, Take 1 tablet (5 mg total) by mouth every evening., Disp: 30 tablet, Rfl: 0    montelukast (SINGULAIR) 10 mg tablet, Take 10 mg by mouth once daily., Disp: , Rfl:     nystatin (MYCOSTATIN) powder, Apply topically 2 (two) times daily. (Patient taking differently: Apply topically as needed.), Disp: 15 g, Rfl: 0    olmesartan (BENICAR) 5 MG Tab, Take 1 tablet by mouth once daily, Disp: 90 tablet, Rfl: 0    promethazine-dextromethorphan (PROMETHAZINE-DM) 6.25-15 mg/5 mL Syrp, Take 5 mLs by mouth every 4 (four) hours as needed., Disp: 118 mL, Rfl: 0    selenium 50 mcg Tab, Take 200 mcg by mouth once daily., Disp: , Rfl:     tiotropium bromide (SPIRIVA RESPIMAT) 1.25 mcg/actuation inhaler, Inhale 2 puffs into the lungs once daily. Controller, Disp: 4 g, Rfl: 3    valACYclovir (VALTREX) 1000 MG tablet, Take 2 tablets (2,000 mg total) by mouth every 12 (twelve) hours., Disp: 4 tablet, Rfl: 2    ketoconazole (NIZORAL) 2 % cream, AAA bid (Patient not taking: Reported on 2024), Disp: 60 g, Rfl: 3    levocetirizine (XYZAL) 5 MG tablet, Take 5 mg by mouth every evening. (Patient not taking: Reported on 2024), Disp: , Rfl:     promethazine-dextromethorphan (PROMETHAZINE-DM) 6.25-15 mg/5 mL Syrp, SMARTSI-10 Milliliter(s) By Mouth Every 6 Hours PRN (Patient not taking: Reported on 2024), Disp: ,  "Rfl:     traMADoL (ULTRAM) 50 mg tablet, Take 1 tablet (50 mg total) by mouth every 6 (six) hours as needed for Pain. (Patient not taking: Reported on 7/16/2024), Disp: 20 each, Rfl: 0    Review of Systems   Constitutional:  Positive for activity change.   Respiratory:  Positive for cough and shortness of breath.        Objective:      /68 (BP Location: Right arm, Patient Position: Sitting, BP Method: Large (Manual))   Pulse 92   Resp 18   Ht 5' 4" (1.626 m)   Wt 96.1 kg (211 lb 13.8 oz)   SpO2 97%   BMI 36.37 kg/m²   Physical Exam  Vitals reviewed.   Constitutional:       General: She is not in acute distress.     Appearance: Normal appearance. She is not ill-appearing, toxic-appearing or diaphoretic.   HENT:      Head: Normocephalic.      Right Ear: External ear normal.      Left Ear: External ear normal.      Nose: Nose normal. No congestion or rhinorrhea.      Mouth/Throat:      Mouth: Mucous membranes are moist.      Pharynx: Oropharynx is clear.   Eyes:      General: No scleral icterus.        Right eye: No discharge.         Left eye: No discharge.      Extraocular Movements: Extraocular movements intact.      Conjunctiva/sclera: Conjunctivae normal.   Cardiovascular:      Rate and Rhythm: Normal rate and regular rhythm.      Pulses: Normal pulses.      Heart sounds: Normal heart sounds. No murmur heard.     No friction rub. No gallop.   Pulmonary:      Effort: Pulmonary effort is normal. No respiratory distress.      Breath sounds: Wheezing present. No rhonchi or rales.      Comments: Significant cough present. Slightly increased respiratory effort   Chest:      Chest wall: No tenderness.   Musculoskeletal:         General: No swelling, tenderness or deformity. Normal range of motion.      Cervical back: Normal range of motion.      Right lower leg: No edema.      Left lower leg: No edema.   Skin:     General: Skin is warm and dry.      Capillary Refill: Capillary refill takes less than 2 " seconds.      Coloration: Skin is not jaundiced.      Findings: No bruising, erythema, lesion or rash.   Neurological:      Mental Status: She is alert and oriented to person, place, and time.         Assessment:       1. Hospital discharge follow-up    2. Severe persistent asthma with (acute) exacerbation    3. Fever blister    4. Benign essential HTN        Plan:       Hospital discharge follow-up        -    Continue meds. Follow up with pulmonology this week. [facility] paperwork completed. Will continue to monitor.     Severe persistent asthma with (acute) exacerbation        -    As above     Fever blister  -     valACYclovir (VALTREX) 1000 MG tablet; Take 2 tablets (2,000 mg total) by mouth every 12 (twelve) hours.  Dispense: 4 tablet; Refill: 2    Benign essential HTN  -     olmesartan (BENICAR) 5 MG Tab; Take 1 tablet (5 mg total) by mouth once daily.  Dispense: 90 tablet; Refill: 3        -    Stable. Continue meds. Will continue to monitor.                  Chava Parson PA-C  Family Medicine Physician Assistant              I spent a total of 30 minutes on the day of the visit.This includes face to face time and non-face to face time preparing to see the patient (eg, review of tests), obtaining and/or reviewing separately obtained history, documenting clinical information in the electronic or other health record, independently interpreting results and communicating results to the patient/family/caregiver, or care coordinator.      We have addressed [4] Moderate: 1 or more chronic illnesses with exacerbation, progression, or side effects of treatment / 2 or more stable chronic illnesses / 1 undiagnosed new problem with uncertain prognosis / 1 acute illness with systemic symptoms / 1 acute complicated injury  The complexity of the data reviewed and analyzed for this visit was [2] Minimal or None  The risk of complications and/or morbidity or mortality are [4] Moderate risk (I.e. prescription drug management /  decision regarding minor surgery with identified pt or procedure risk factors / decision regarding elective major surgery without identified pt or procedure risk factors / diagnosis or treatment significantly limited by social determinants of health)   The level of Medical Decision Making for this visit is [4] Moderate

## 2024-07-16 NOTE — PROGRESS NOTES
C3 nurse spoke with Sondra Beltran for a TCC post hospital discharge follow up call. The patient has a scheduled HOSFU appointment with Chava Parson PA-C on 07/16/24 @ 3793.

## 2024-07-16 NOTE — PATIENT INSTRUCTIONS
Celestino Amaro,     If you are due for any health screening(s) below please notify me so we can arrange them to be ordered and scheduled to maintain your health. Most healthy patients complete it. Don't lose out on improving your health.     Tests to Keep You Healthy    Mammogram: Met on 3/14/2024  Colon Cancer Screening: Met on 5/29/2023  Last Blood Pressure <= 139/89 (7/16/2024): NO

## 2024-10-07 PROBLEM — J96.01 ACUTE HYPOXEMIC RESPIRATORY FAILURE: Status: RESOLVED | Noted: 2024-07-08 | Resolved: 2024-10-07

## 2024-10-14 ENCOUNTER — TELEPHONE (OUTPATIENT)
Dept: OPTOMETRY | Facility: CLINIC | Age: 54
End: 2024-10-14
Payer: COMMERCIAL

## 2024-10-14 ENCOUNTER — TELEPHONE (OUTPATIENT)
Dept: OPHTHALMOLOGY | Facility: CLINIC | Age: 54
End: 2024-10-14
Payer: COMMERCIAL

## 2024-10-14 NOTE — TELEPHONE ENCOUNTER
Called to let pt know we are out of network w her [facility]- pt will see in network provider. canceled Dr Chelsie menjivart

## 2024-10-14 NOTE — TELEPHONE ENCOUNTER
----- Message from Candice sent at 10/14/2024  8:05 AM CDT -----  Type:  Patient Returning Call    Who Called:PT      Does the patient know what this is regarding?:Call    Would the patient rather a call back or a response via [facility]? Gael Baldwin Call Back Number:[phone]       Additional Information: PT want to know when to schedule her annaul. Please call back to advise. Thank you!

## 2024-11-22 ENCOUNTER — PATIENT MESSAGE (OUTPATIENT)
Dept: PODIATRY | Facility: CLINIC | Age: 54
End: 2024-11-22
Payer: COMMERCIAL

## 2024-12-10 ENCOUNTER — HOSPITAL ENCOUNTER (EMERGENCY)
Facility: HOSPITAL | Age: 54
Discharge: HOME | End: 2024-12-10
Attending: EMERGENCY MEDICINE
Payer: COMMERCIAL

## 2024-12-10 VITALS
OXYGEN SATURATION: 99 % | HEART RATE: 101 BPM | RESPIRATION RATE: 20 BRPM | SYSTOLIC BLOOD PRESSURE: 144 MMHG | BODY MASS INDEX: 33.64 KG/M2 | DIASTOLIC BLOOD PRESSURE: 63 MMHG | TEMPERATURE: 98 F | WEIGHT: 196 LBS

## 2024-12-10 DIAGNOSIS — J40 BRONCHITIS: ICD-10-CM

## 2024-12-10 DIAGNOSIS — J45.21 MILD INTERMITTENT ASTHMA WITH EXACERBATION: Primary | ICD-10-CM

## 2024-12-10 DIAGNOSIS — R07.9 CHEST PAIN: ICD-10-CM

## 2024-12-10 LAB
ADENOVIRUS: NOT DETECTED
ALBUMIN SERPL BCP-MCNC: 3.8 G/DL (ref 3.5–5.2)
ALP SERPL-CCNC: 84 U/L (ref 40–150)
ALT SERPL W/O P-5'-P-CCNC: 29 U/L (ref 10–44)
ANION GAP SERPL CALC-SCNC: 11 MMOL/L (ref 8–16)
AST SERPL-CCNC: 25 U/L (ref 10–40)
BASOPHILS # BLD AUTO: 0.02 K/UL (ref 0–0.2)
BASOPHILS NFR BLD: 0.3 % (ref 0–1.9)
BILIRUB SERPL-MCNC: 1 MG/DL (ref 0.1–1)
BNP SERPL-MCNC: 17 PG/ML (ref 0–99)
BORDETELLA PARAPERTUSSIS (IS1001): NOT DETECTED
BORDETELLA PERTUSSIS (PTXP): NOT DETECTED
BUN SERPL-MCNC: 13 MG/DL (ref 6–20)
CALCIUM SERPL-MCNC: 9.6 MG/DL (ref 8.7–10.5)
CHLAMYDIA PNEUMONIAE: NOT DETECTED
CHLORIDE SERPL-SCNC: 105 MMOL/L (ref 95–110)
CO2 SERPL-SCNC: 24 MMOL/L (ref 23–29)
CORONAVIRUS 229E, COMMON COLD VIRUS: NOT DETECTED
CORONAVIRUS HKU1, COMMON COLD VIRUS: NOT DETECTED
CORONAVIRUS NL63, COMMON COLD VIRUS: NOT DETECTED
CORONAVIRUS OC43, COMMON COLD VIRUS: NOT DETECTED
CREAT SERPL-MCNC: 0.8 MG/DL (ref 0.5–1.4)
D DIMER PPP IA.FEU-MCNC: 0.22 MG/L FEU
DIFFERENTIAL METHOD BLD: ABNORMAL
EOSINOPHIL # BLD AUTO: 0 K/UL (ref 0–0.5)
EOSINOPHIL NFR BLD: 0 % (ref 0–8)
ERYTHROCYTE [DISTWIDTH] IN BLOOD BY AUTOMATED COUNT: 12.9 % (ref 11.5–14.5)
EST. GFR  (NO RACE VARIABLE): >60 ML/MIN/1.73 M^2
FLUBV RNA NPH QL NAA+NON-PROBE: NOT DETECTED
GLUCOSE SERPL-MCNC: 179 MG/DL (ref 70–110)
HCT VFR BLD AUTO: 40.5 % (ref 37–48.5)
HGB BLD-MCNC: 13.7 G/DL (ref 12–16)
HPIV1 RNA NPH QL NAA+NON-PROBE: NOT DETECTED
HPIV2 RNA NPH QL NAA+NON-PROBE: NOT DETECTED
HPIV3 RNA NPH QL NAA+NON-PROBE: NOT DETECTED
HPIV4 RNA NPH QL NAA+NON-PROBE: NOT DETECTED
HUMAN METAPNEUMOVIRUS: NOT DETECTED
IMM GRANULOCYTES # BLD AUTO: 0.03 K/UL (ref 0–0.04)
IMM GRANULOCYTES NFR BLD AUTO: 0.5 % (ref 0–0.5)
INFLUENZA A (SUBTYPES H1,H1-2009,H3): NOT DETECTED
INFLUENZA A, MOLECULAR: NEGATIVE
INFLUENZA B, MOLECULAR: NEGATIVE
LYMPHOCYTES # BLD AUTO: 1.1 K/UL (ref 1–4.8)
LYMPHOCYTES NFR BLD: 16.2 % (ref 18–48)
MCH RBC QN AUTO: 30.6 PG (ref 27–31)
MCHC RBC AUTO-ENTMCNC: 33.8 G/DL (ref 32–36)
MCV RBC AUTO: 91 FL (ref 82–98)
MONOCYTES # BLD AUTO: 0.2 K/UL (ref 0.3–1)
MONOCYTES NFR BLD: 3.1 % (ref 4–15)
MYCOPLASMA PNEUMONIAE: NOT DETECTED
NEUTROPHILS # BLD AUTO: 5.2 K/UL (ref 1.8–7.7)
NEUTROPHILS NFR BLD: 79.9 % (ref 38–73)
NRBC BLD-RTO: 0 /100 WBC
PLATELET # BLD AUTO: 261 K/UL (ref 150–450)
PMV BLD AUTO: 11 FL (ref 9.2–12.9)
POTASSIUM SERPL-SCNC: 4.2 MMOL/L (ref 3.5–5.1)
PROT SERPL-MCNC: 7 G/DL (ref 6–8.4)
RBC # BLD AUTO: 4.47 M/UL (ref 4–5.4)
RESPIRATORY INFECTION PANEL SOURCE: NORMAL
RSV RNA NPH QL NAA+NON-PROBE: NOT DETECTED
RV+EV RNA NPH QL NAA+NON-PROBE: NOT DETECTED
SARS-COV-2 RDRP RESP QL NAA+PROBE: NEGATIVE
SARS-COV-2 RNA RESP QL NAA+PROBE: NOT DETECTED
SODIUM SERPL-SCNC: 140 MMOL/L (ref 136–145)
SPECIMEN SOURCE: NORMAL
TROPONIN I SERPL DL<=0.01 NG/ML-MCNC: <0.006 NG/ML (ref 0–0.03)
WBC # BLD AUTO: 6.47 K/UL (ref 3.9–12.7)

## 2024-12-10 PROCEDURE — 25000242 PHARM REV CODE 250 ALT 637 W/ HCPCS: Performed by: NURSE PRACTITIONER

## 2024-12-10 PROCEDURE — 25500020 PHARM REV CODE 255

## 2024-12-10 PROCEDURE — 80053 COMPREHEN METABOLIC PANEL: CPT | Performed by: NURSE PRACTITIONER

## 2024-12-10 PROCEDURE — 85025 COMPLETE CBC W/AUTO DIFF WBC: CPT | Performed by: NURSE PRACTITIONER

## 2024-12-10 PROCEDURE — 87502 INFLUENZA DNA AMP PROBE: CPT | Performed by: NURSE PRACTITIONER

## 2024-12-10 PROCEDURE — 93010 ELECTROCARDIOGRAM REPORT: CPT | Mod: ,,, | Performed by: GENERAL PRACTICE

## 2024-12-10 PROCEDURE — 85379 FIBRIN DEGRADATION QUANT: CPT | Performed by: NURSE PRACTITIONER

## 2024-12-10 PROCEDURE — 93005 ELECTROCARDIOGRAM TRACING: CPT

## 2024-12-10 PROCEDURE — 36415 COLL VENOUS BLD VENIPUNCTURE: CPT | Performed by: NURSE PRACTITIONER

## 2024-12-10 PROCEDURE — 96374 THER/PROPH/DIAG INJ IV PUSH: CPT

## 2024-12-10 PROCEDURE — 63600175 PHARM REV CODE 636 W HCPCS: Performed by: NURSE PRACTITIONER

## 2024-12-10 PROCEDURE — 99285 EMERGENCY DEPT VISIT HI MDM: CPT | Mod: 25

## 2024-12-10 PROCEDURE — 94640 AIRWAY INHALATION TREATMENT: CPT

## 2024-12-10 PROCEDURE — 94760 N-INVAS EAR/PLS OXIMETRY 1: CPT

## 2024-12-10 PROCEDURE — 84484 ASSAY OF TROPONIN QUANT: CPT | Performed by: EMERGENCY MEDICINE

## 2024-12-10 PROCEDURE — 87635 SARS-COV-2 COVID-19 AMP PRB: CPT | Performed by: NURSE PRACTITIONER

## 2024-12-10 PROCEDURE — 83880 ASSAY OF NATRIURETIC PEPTIDE: CPT | Performed by: NURSE PRACTITIONER

## 2024-12-10 PROCEDURE — 36415 COLL VENOUS BLD VENIPUNCTURE: CPT | Performed by: EMERGENCY MEDICINE

## 2024-12-10 PROCEDURE — 87798 DETECT AGENT NOS DNA AMP: CPT | Mod: 59 | Performed by: NURSE PRACTITIONER

## 2024-12-10 RX ORDER — PREDNISONE 20 MG/1
20 TABLET ORAL DAILY
Qty: 12 TABLET | Refills: 0 | Status: SHIPPED | OUTPATIENT
Start: 2024-12-11 | End: 2024-12-16

## 2024-12-10 RX ORDER — IPRATROPIUM BROMIDE AND ALBUTEROL SULFATE 2.5; .5 MG/3ML; MG/3ML
3 SOLUTION RESPIRATORY (INHALATION)
Status: COMPLETED | OUTPATIENT
Start: 2024-12-10 | End: 2024-12-10

## 2024-12-10 RX ORDER — METHYLPREDNISOLONE SOD SUCC 125 MG
125 VIAL (EA) INJECTION
Status: COMPLETED | OUTPATIENT
Start: 2024-12-10 | End: 2024-12-10

## 2024-12-10 RX ADMIN — IPRATROPIUM BROMIDE AND ALBUTEROL SULFATE 3 ML: .5; 3 SOLUTION RESPIRATORY (INHALATION) at 03:12

## 2024-12-10 RX ADMIN — METHYLPREDNISOLONE SODIUM SUCCINATE 125 MG: 125 INJECTION, POWDER, FOR SOLUTION INTRAMUSCULAR; INTRAVENOUS at 05:12

## 2024-12-10 RX ADMIN — IOHEXOL 75 ML: 350 INJECTION, SOLUTION INTRAVENOUS at 04:12

## 2024-12-10 NOTE — FIRST PROVIDER EVALUATION
Emergency Department TeleTriage Encounter Note      CHIEF COMPLAINT    Chief Complaint   Patient presents with    Shortness of Breath     Sent from MD office to rule out blood clot.        VITAL SIGNS   Initial Vitals [12/10/24 1344]   BP Pulse Resp Temp SpO2   (!) 147/69 (!) 113 (!) 24 98.1 °F (36.7 °C) 98 %      MAP       --            ALLERGIES    Review of patient's allergies indicates:   Allergen Reactions    Sulfamethoxazole-trimethoprim Rash and Hives       PROVIDER TRIAGE NOTE  Shortness of breath for the past four days and chest pain. Reports SOB increases with exertion, speaking. Sent by Dr. Sood for evaluation.      ORDERS  Labs Reviewed - No data to display    ED Orders (720h ago, onward)      Start Ordered     Status Ordering Provider    12/10/24 1647 12/10/24 1347  Troponin I High Sensitivity #2  Once Timed         Ordered JOANNEDARTJ.    12/10/24 1348 12/10/24 1347  X-Ray Chest PA And Lateral  1 time imaging         Ordered TJ BURGESS    12/10/24 1347 12/10/24 1347  Vital signs  Every 15 min         Ordered KASHRTJ.    12/10/24 1347 12/10/24 1347  Cardiac Monitoring - Adult  Continuous        Comments: Notify Physician If:    Ordered KASHRTJ.    12/10/24 1347 12/10/24 1347  Pulse Oximetry Continuous  Continuous         Ordered KASHRTJ.    12/10/24 1347 12/10/24 1347  Diet NPO  Diet effective now         Ordered TJ BURGESS    12/10/24 1347 12/10/24 1347  Saline lock IV  Once         Ordered KASHRCOBYA EMMA.    12/10/24 1347 12/10/24 1347  EKG 12-lead  Once        Comments: Do not perform if previously done during this visit/ triage    Ordered KASHRTJ.    12/10/24 1347 12/10/24 1347  CBC auto differential  STAT         Pending Collection JOANNEDARTJ.    12/10/24 1347 12/10/24 1347  Comprehensive metabolic panel  STAT         Ordered TJ BURGESS    12/10/24 1347 12/10/24 1347  Troponin I High Sensitivity #1  STAT         Ordered  TJ BURGESS    12/10/24 1347 12/10/24 1347  B-Type natriuretic peptide (BNP)  STAT         Ordered TJ BURGESS              Virtual Visit Note: The provider triage portion of this emergency department evaluation and documentation was performed via [employer]ianbo[employer], a HIPAA-compliant telemedicine application, in concert with a tele-presenter in the room. A face to face patient evaluation with one of my colleagues will occur once the patient is placed in an emergency department room.      DISCLAIMER: This note was prepared with [employer] voice recognition transcription software. Garbled syntax, mangled pronouns, and other bizarre constructions may be attributed to that software system.

## 2024-12-11 NOTE — DISCHARGE INSTRUCTIONS
Continue your current antibiotics as prescribed by your immunologist.  Continue your inhalers and nebulizers as prescribed.

## 2024-12-11 NOTE — ED PROVIDER NOTES
Encounter Date: 12/10/2024       History     Chief Complaint   Patient presents with    Shortness of Breath     Sent from MD office to rule out blood clot.      Patient is a 54 y.o. female who presents to the ED 12/10/2024 with a chief complaint of Cough for approximately a week.  She takes infusions for her chronic autoimmune deficiency regularly.  She also has a history of asthma in his followed by immunology and takes regular inhalers and albuterol nebulizers.  She has been on 10 mg of prednisone and was supposed to start a higher dose tomorrow but decided to come to the emergency department and make sure she did have a pulmonary embolism or anything else going on given her cough is getting progressively worse and making her short of breath.  She has not had any fever nausea vomiting diarrhea or chest pain.  Other history includes hypertension.  She has been on 2 different antibiotics but she is not sure what they are in his still on the 2nd 1 prescribed to her by immunology for her cough.             Review of patient's allergies indicates:   Allergen Reactions    Sulfamethoxazole-trimethoprim Rash and Hives     Past Medical History:   Diagnosis Date    Asthma     Last ER visit approx 2010. usually after illness    Benign essential HTN 06/19/2017    CVID (common variable immunodeficiency)     Neck strain 05/17/2016    Thyroid nodule 06/28/2017     Past Surgical History:   Procedure Laterality Date    CATARACT EXTRACTION W/  INTRAOCULAR LENS IMPLANT Left 10/25/2023    Procedure: CEIOL OS;  Surgeon: Suhail Espinal MD;  Location: [facility] OR;  Service: Ophthalmology;  Laterality: Left;    CATARACT EXTRACTION W/  INTRAOCULAR LENS IMPLANT Right 11/15/2023    Procedure: CEIOL OD;  Surgeon: Suhail Espinal MD;  Location: [facility] OR;  Service: Ophthalmology;  Laterality: Right;    cervial cone surgery      CLOSURE OF VESICOVAGINAL FISTULA N/A 06/06/2019    Procedure: excision of vaginal lesion vs urethral  diverticulectomy;  Surgeon: Sondra Cobb MD;  Location: [facility] OR;  Service: Urology;  Laterality: N/A;   to assist, first case, please put  as co-surgeon    COLONOSCOPY N/A 5/29/2023    Procedure: COLONOSCOPY;  Surgeon: Yimi Gan MD;  Location: [facility] ENDO;  Service: Endoscopy;  Laterality: N/A;    CYSTOSCOPY N/A 04/22/2019    Procedure: CYSTOSCOPY;  Surgeon: Sondra Cobb MD;  Location: [facility] OR;  Service: Urology;  Laterality: N/A;    HYSTERECTOMY      KNEE SURGERY      nose surgery      VAGINOSCOPY N/A 04/22/2019    Procedure: VAGINOSCOPY;  Surgeon: Sondra Cobb MD;  Location: [facility] OR;  Service: Urology;  Laterality: N/A;     Family History   Problem Relation Name Age of Onset    Diabetes Mother      Hypertension Mother      Heart disease Mother      Rheum arthritis Mother      Hyperlipidemia Mother      Heart disease Father      Hypertension Father      Hyperlipidemia Father      Bladder Cancer Father      Cancer Paternal Aunt      Cancer Paternal Grandmother      Glaucoma Neg Hx      Macular degeneration Neg Hx       Social History     Tobacco Use    Smoking status: Never     Passive exposure: Never    Smokeless tobacco: Never   Substance Use Topics    Alcohol use: No    Drug use: No     Review of Systems   Constitutional:  Negative for fever.   HENT:  Negative for sore throat.    Respiratory:  Positive for cough and shortness of breath.    Cardiovascular:  Negative for chest pain.   Gastrointestinal:  Negative for nausea.   Genitourinary:  Negative for dysuria.   Musculoskeletal:  Negative for back pain.   Skin:  Negative for rash.   Neurological:  Negative for weakness.   Hematological:  Does not bruise/bleed easily.       Physical Exam     Initial Vitals [12/10/24 1344]   BP Pulse Resp Temp SpO2   (!) 147/69 (!) 113 (!) 24 98.1 °F (36.7 °C) 98 %      MAP       --         Physical Exam    Nursing note and vitals reviewed.  Constitutional: Vital signs  are normal. She appears well-developed and well-nourished.   HENT:   Head: Normocephalic and atraumatic.   Eyes: Pupils are equal, round, and reactive to light.   Neck: Neck supple.   Cardiovascular:  Normal rate, regular rhythm, normal heart sounds and intact distal pulses.     Exam reveals no gallop and no friction rub.       No murmur heard.  Pulmonary/Chest: She has decreased breath sounds in the right lower field and the left lower field. She has no wheezes. She has no rhonchi. She has no rales.   Abdominal: Abdomen is soft. Bowel sounds are normal. There is no abdominal tenderness.   Musculoskeletal:      Cervical back: Neck supple.     Neurological: She is alert and oriented to person, place, and time. She has normal strength.   Skin: Skin is warm, dry and intact.   Psychiatric: She has a normal mood and affect. Her speech is normal and behavior is normal.         ED Course   Procedures  Labs Reviewed   CBC W/ AUTO DIFFERENTIAL - Abnormal       Result Value    WBC 6.47      RBC 4.47      Hemoglobin 13.7      Hematocrit 40.5      MCV 91      MCH 30.6      MCHC 33.8      RDW 12.9      Platelets 261      MPV 11.0      Immature Granulocytes 0.5      Gran # (ANC) 5.2      Immature Grans (Abs) 0.03      Lymph # 1.1      Mono # 0.2 (*)     Eos # 0.0      Baso # 0.02      nRBC 0      Gran % 79.9 (*)     Lymph % 16.2 (*)     Mono % 3.1 (*)     Eosinophil % 0.0      Basophil % 0.3      Differential Method Automated      Narrative:     Collection has been rescheduled by RBB at 12/10/2024 14:03 Reason:   Patient unavailable   COMPREHENSIVE METABOLIC PANEL - Abnormal    Sodium 140      Potassium 4.2      Chloride 105      CO2 24      Glucose 179 (*)     BUN 13      Creatinine 0.8      Calcium 9.6      Total Protein 7.0      Albumin 3.8      Total Bilirubin 1.0      Alkaline Phosphatase 84      AST 25      ALT 29      eGFR >60      Anion Gap 11      Narrative:     Collection has been rescheduled by RBB at 12/10/2024 14:03  Reason:   Patient unavailable   INFLUENZA A & B BY MOLECULAR    Influenza A, Molecular Negative      Influenza B, Molecular Negative      Flu A & B Source Nasal swab     RESPIRATORY INFECTION PANEL (PCR), NASOPHARYNGEAL    Respiratory Infection Panel Source NP Swab     B-TYPE NATRIURETIC PEPTIDE    BNP 17      Narrative:     Collection has been rescheduled by RBB at 12/10/2024 14:03 Reason:   Patient unavailable   D DIMER, QUANTITATIVE    D-Dimer 0.22      Narrative:     Collection has been rescheduled by RBB at 12/10/2024 14:31 Reason:   Add on   TROPONIN I    Troponin I <0.006     SARS-COV-2 RNA AMPLIFICATION, QUAL    SARS-CoV-2 RNA, Amplification, Qual Negative       EKG Readings: (Independently Interpreted)   Initial Reading: No STEMI. Rhythm: Normal Sinus Rhythm. Heart Rate: 96. Ectopy: No Ectopy. Conduction: Normal. ST Segments: Normal ST Segments. T Waves: Normal. Clinical Impression: Normal Sinus Rhythm Other Impression: no ST elevation or depression.   Reviewed by Dr. Gifford as well.        Imaging Results              CTA Chest Non-Coronary (PE Studies) (Final result)  Result time 12/10/24 17:21:39      Final result by Lucila Cruz MD (12/10/24 17:21:39)                   Impression:      No intraluminal filling defects in pulmonary artery suggesting pulmonary artery embolism.  Additional findings as detailed above including 3 mm right lower lobe nodule not significantly changed compared to prior chest CT 11/11/2019      Electronically signed by: Lucila Cruz MD  Date:    12/10/2024  Time:    17:21               Narrative:    EXAMINATION:  CTA CHEST NON CORONARY (PE STUDIES)    CLINICAL HISTORY:  Pulmonary embolism (PE) suspected, high prob;    TECHNIQUE:  Low dose axial images, sagittal and coronal reformations were obtained from the thoracic inlet to the lung bases following the IV administration of 75 mL of Omnipaque 350.  Contrast timing was optimized to evaluate the pulmonary arteries.  MIP  images were performed.    COMPARISON:  None    FINDINGS:  There is good pulmonary artery enhancement.  There are no intraluminal filling defects in pulmonary artery suggesting pulmonary artery embolism.  There is no significant hilar or mediastinal adenopathy.  There is no pleural or pericardial effusion.  The visualized structures at the base of the neck are unremarkable in appearance.  Ascending aorta is ectatic with diameter 3.9 cm.  There are vascular calcifications including coronary artery calcifications    Visualized upper abdominal structures show no acute findings.  Diffuse fatty infiltration suggested in the liver    Lung; no pneumothorax.  No subpleural bleb formation or honeycombing.  Mild dependent hypoventilatory changes.  3 mm juxtapleural nodule laterally right lung base series 3, image 276.  Not significantly changed compared to prior study 11/11/2019.  Osseous structures show degenerative changes.  Mild endplate concavity of a lower thoracic vertebral body suggesting Schmorl's node or mild old compression.                                       X-Ray Chest PA And Lateral (Final result)  Result time 12/10/24 14:06:04      Final result by Yaw Deleon Jr., MD (12/10/24 14:06:04)                   Impression:      No acute abnormality.      Electronically signed by: Yaw Deleon MD  Date:    12/10/2024  Time:    14:06               Narrative:    EXAMINATION:  XR CHEST PA AND LATERAL    CLINICAL HISTORY:  Chest Pain;    TECHNIQUE:  PA and lateral views of the chest were performed.    COMPARISON:  Chest x-ray of July 8, 2024    FINDINGS:  The lungs are clear, with normal appearance of pulmonary vasculature and no pleural effusion or pneumothorax.    The cardiac silhouette is normal in size. The hilar and mediastinal contours are unremarkable.    Bones are intact.                                       Medications   albuterol-ipratropium 2.5 mg-0.5 mg/3 mL nebulizer solution 3 mL (3 mLs  Nebulization Given 12/10/24 1540)   methylPREDNISolone sodium succinate injection 125 mg (125 mg Intravenous Given 12/10/24 1721)   iohexoL (OMNIPAQUE 350) 350 mg iodine/mL injection (75 mLs Intravenous Given 12/10/24 1658)     Medical Decision Making       APC / Resident Notes:   Patient is a 54 y.o. female who presents to the ED 12/10/2024 Who underwent emergent evaluation for cough.  She is currently on antibiotics prescribed by immunology.  No leukocytosis.  She is not febrile.  She does not appear acutely septic or toxic.  She has no evidence of pneumonia on x-ray or CT today and no evidence of pulmonary embolism or other emergent process.  She is not wheezing.  She does have some improvement however with nebulizers and steroids in the ED in his given higher dose of steroids for home.  She is on maximum therapy for inhaled and nebulized albuterol and steroids at home.  She may continue this as prescribed.  She would likely benefit from higher dose of steroids.  I do not think admission indicated at this time as patient is not hypoxic or in any acute respiratory distress and will do trial of increased steroids and she may continue her current antibiotic therapy prescribed her by immunology and I do not think new or different antibiotics indicated at this time as there is no sign of any bacterial infection.  Likely bronchitis.  EKG without evidence of ischemia and troponin normal and I do not think atypical ACS.  Not think other emergent intrathoracic process such as dissection at this time.  Findings discussed with patient who is agreeable the plan of care. Based on my clinical evaluation, I do not appreciate any immediate, emergent, or life threatening condition or etiology that warrants additional workup today and feel that the patient can be discharged with close follow up care. Case discussed with Dr. Gifford who is agreeable to plan of care. Follow up and return precautions discussed; patient verbalized  understanding and is agreeable to plan of care. Patient discharged home in stable condition.          Attending Attestation:     Physician Attestation Statement for NP/PA:       Other NP/PA Attestation Additions:    History of Present Illness: 54-year-old female presents for a cough and shortness of breath.    Medical Decision Making: Initial differential diagnosis included but not limited to viral illness, asthma exacerbation, pulmonary embolism, and pneumonia.  The patient's chest x-ray showed no acute abnormalities per Radiology.  The patient's CT scan of her chest showed no evidence of pulmonary embolism per Radiology.  Her labs showed no acute processes.  I am in agreement with the nurse practitioner's assessment, treatment, and plan of care.                        Medical Decision Making:   Differential Diagnosis:   Bronchitis  Pneumonia  PE             Clinical Impression:  Final diagnoses:  [R07.9] Chest pain  [J45.21] Mild intermittent asthma with exacerbation (Primary)  [J40] Bronchitis          ED Disposition Condition    Discharge Stable          ED Prescriptions       Medication Sig Dispense Start Date End Date Auth. Provider    predniSONE (DELTASONE) 20 MG tablet Take 1 tablet (20 mg total) by mouth once daily. Take 3 tablets daily on day #1-2, Take 2 tablets daily on days #3-4, Take 1 tablet daily on day #5-6, then stop taking for 5 days 12 tablet 12/11/2024 12/16/2024 Carol Merritt NP          Follow-up Information       Follow up With Specialties Details Why Contact Info Additional Information    Zahra Scott MD Family Medicine In 3 days  [address] [facility] [address]  [phone]       [facility] -  Emergency Medicine  As needed, If symptoms worsen [address] [place] [address] [address] [place] [address] 1st floor             Carol Merritt NP  12/10/24 1820       Alex Gifford MD  12/10/24 2023

## 2024-12-16 LAB
OHS QRS DURATION: 90 MS
OHS QTC CALCULATION: 457 MS

## 2025-01-02 ENCOUNTER — TELEPHONE (OUTPATIENT)
Dept: FAMILY MEDICINE | Facility: CLINIC | Age: 55
End: 2025-01-02
Payer: COMMERCIAL

## 2025-01-02 NOTE — TELEPHONE ENCOUNTER
Left message for patient to return call in regards to rescheduling appointment with Dr. Scott on 1/19/25 to 1/29/25 with BERNARDINO Johansen.

## 2025-01-02 NOTE — TELEPHONE ENCOUNTER
Returned Mrs. Beltran call in regards to rescheduling her appointment on 1/16/25 with Dr. Scott. Appointment was rescheduled to 1/29/25 at 8:30 with BERNARDINO Johansen.

## 2025-01-02 NOTE — TELEPHONE ENCOUNTER
----- Message from [employer] sent at 1/2/2025  8:59 AM CST -----  Type: Needs Medical Advice  Who Called:  armani   Denny Call Back Number: [phone]    Additional Information: armani  would like to  her 1/156 to 1/29 if possible , please call patient back to confirm new date and time of appointment , if not answer please leave message thank you .

## 2025-01-02 NOTE — TELEPHONE ENCOUNTER
----- Message from Kait sent at 1/2/2025 12:59 PM CST -----  Contact: Patient  Type:  Patient Returning Call    Who Called:  Patient  Who Left Message for Patient:  Emily  Does the patient know what this is regarding?:  Appointment    Would the patient rather a call back or a response via [facility]?   Call back  Best Call Back Number:  [phone]    Additional Information:   States she is returning a missed call - please call back - thank you

## 2025-01-29 ENCOUNTER — OFFICE VISIT (OUTPATIENT)
Dept: FAMILY MEDICINE | Facility: CLINIC | Age: 55
End: 2025-01-29
Payer: COMMERCIAL

## 2025-01-29 VITALS
OXYGEN SATURATION: 97 % | HEART RATE: 78 BPM | RESPIRATION RATE: 12 BRPM | SYSTOLIC BLOOD PRESSURE: 138 MMHG | DIASTOLIC BLOOD PRESSURE: 80 MMHG | WEIGHT: 200.38 LBS | HEIGHT: 64 IN | BODY MASS INDEX: 34.21 KG/M2 | TEMPERATURE: 98 F

## 2025-01-29 DIAGNOSIS — Z12.31 ENCOUNTER FOR SCREENING MAMMOGRAM FOR MALIGNANT NEOPLASM OF BREAST: ICD-10-CM

## 2025-01-29 DIAGNOSIS — B00.1 FEVER BLISTER: ICD-10-CM

## 2025-01-29 DIAGNOSIS — J45.50 SEVERE PERSISTENT ASTHMA WITHOUT COMPLICATION: ICD-10-CM

## 2025-01-29 DIAGNOSIS — D84.9 IMMUNE DEFICIENCY DISORDER: ICD-10-CM

## 2025-01-29 DIAGNOSIS — I10 BENIGN ESSENTIAL HTN: Primary | ICD-10-CM

## 2025-01-29 PROCEDURE — 99999 PR PBB SHADOW E&M-EST. PATIENT-LVL V: CPT | Mod: PBBFAC,,,

## 2025-01-29 PROCEDURE — 3008F BODY MASS INDEX DOCD: CPT | Mod: CPTII,S$GLB,,

## 2025-01-29 PROCEDURE — 3075F SYST BP GE 130 - 139MM HG: CPT | Mod: CPTII,S$GLB,,

## 2025-01-29 PROCEDURE — 1160F RVW MEDS BY RX/DR IN RCRD: CPT | Mod: CPTII,S$GLB,,

## 2025-01-29 PROCEDURE — 1159F MED LIST DOCD IN RCRD: CPT | Mod: CPTII,S$GLB,,

## 2025-01-29 PROCEDURE — 99214 OFFICE O/P EST MOD 30 MIN: CPT | Mod: S$GLB,,,

## 2025-01-29 PROCEDURE — 4010F ACE/ARB THERAPY RXD/TAKEN: CPT | Mod: CPTII,S$GLB,,

## 2025-01-29 PROCEDURE — 3079F DIAST BP 80-89 MM HG: CPT | Mod: CPTII,S$GLB,,

## 2025-01-29 PROCEDURE — G2211 COMPLEX E/M VISIT ADD ON: HCPCS | Mod: S$GLB,,,

## 2025-01-29 RX ORDER — OLMESARTAN MEDOXOMIL 5 MG/1
5 TABLET ORAL DAILY
Qty: 90 TABLET | Refills: 3 | Status: SHIPPED | OUTPATIENT
Start: 2025-01-29

## 2025-01-29 RX ORDER — VALACYCLOVIR HYDROCHLORIDE 1 G/1
TABLET, FILM COATED ORAL
Qty: 12 TABLET | Refills: 1 | Status: SHIPPED | OUTPATIENT
Start: 2025-01-29

## 2025-01-29 NOTE — PROGRESS NOTES
Subjective:       Patient ID:  [id number]     Chief Complaint: Follow-up, Asthma, and Hypertension      History of Present Illness    Ms. Beltran presents today for six month follow-up. She recently resumed GammaGuard infusions (65 mg every 4 weeks) after a 2.5 year interruption due to insurance issues. Despite 3 months of treatment, she continues to experience fatigue. She reports elevated blood pressure initially in the office which subsequently decreased, attributed to white coat anxiety. She continues olmesartan 5 mg for management. She reports coughing for the past two nights with mild chest symptoms, managed with prescription cough syrup which has helped with nighttime coughing. She has a nebulizer available if symptoms worsen and continues Symbicort as prescribed. She is due for mammogram after March 14th. She requests refill of Valtrex 2000 mg twice daily for fever blisters.   She has no other concerns today.    Past Medical History:   Diagnosis Date    Asthma     Last ER visit approx 2010. usually after illness    Benign essential HTN 06/19/2017    CVID (common variable immunodeficiency)     Neck strain 05/17/2016    Thyroid nodule 06/28/2017      Active Problem List with Overview Notes    Diagnosis Date Noted    CVID (common variable immunodeficiency)     Severe persistent asthma without complication 07/09/2019    Lung nodules 07/09/2019    Vaginal lesion 06/06/2019    Urethral diverticulum 04/22/2019    Thyroid nodule 06/28/2017    Benign essential HTN 06/19/2017    Borderline abnormal TFTs 03/15/2017    Moderate asthma with exacerbation 03/23/2016    Immune deficiency disorder 03/23/2016    Obesity (BMI 30-39.9) 03/22/2016      Review of patient's allergies indicates:   Allergen Reactions    Sulfamethoxazole-trimethoprim Rash and Hives         Current Outpatient Medications:     albuterol (ACCUNEB) 1.25 mg/3 mL Nebu, Inhale 6 mL 3 times a day by inhalation route., Disp: , Rfl:     albuterol sulfate (INV  ALBUTEROL) 90 mcg inhalation, Inhale into the lungs as needed. Take one puff by mouth as directed by Physician. Investigational Medication. Patient Study#    ., Disp: , Rfl:     ascorbic acid, vitamin C, (VITAMIN C) 500 MG tablet, Take 500 mg by mouth 3 (three) times daily., Disp: , Rfl:     azelastine (ASTELIN) 137 mcg (0.1 %) nasal spray, SMARTSIG:Both Nares Strength: 137 mcg (0.1 %) (Patient taking differently: 1 spray Daily. SMARTSIG:Both Nares Strength: 137 mcg (0.1 %)), Disp: 30 mL, Rfl: 3    budesonide-formoterol 160-4.5 mcg (SYMBICORT) 160-4.5 mcg/actuation HFAA, Inhale 2 puffs into the lungs every 12 (twelve) hours. Controller, Disp: 6 g, Rfl: 3    fluticasone propionate (FLONASE) 50 mcg/actuation nasal spray, 2 sprays (100 mcg total) by Each Nostril route 2 (two) times daily., Disp: 9.9 g, Rfl: 3    levocetirizine (XYZAL) 5 MG tablet, Take 5 mg by mouth every evening., Disp: , Rfl:     levocetirizine (XYZAL) 5 MG tablet, Take 1 tablet (5 mg total) by mouth every evening., Disp: 30 tablet, Rfl: 0    montelukast (SINGULAIR) 10 mg tablet, Take 10 mg by mouth once daily., Disp: , Rfl:     nystatin (MYCOSTATIN) powder, Apply topically 2 (two) times daily. (Patient taking differently: Apply topically as needed.), Disp: 15 g, Rfl: 0    promethazine-dextromethorphan (PROMETHAZINE-DM) 6.25-15 mg/5 mL Syrp, , Disp: , Rfl:     selenium 50 mcg Tab, Take 200 mcg by mouth once daily., Disp: , Rfl:     tiotropium bromide (SPIRIVA RESPIMAT) 1.25 mcg/actuation inhaler, Inhale 2 puffs into the lungs once daily. Controller, Disp: 4 g, Rfl: 3    guaiFENesin (MUCINEX) 600 mg 12 hr tablet, Take 2 tablets (1,200 mg total) by mouth 2 (two) times daily. (Patient not taking: Reported on 1/29/2025), Disp: 30 tablet, Rfl: 0    ketoconazole (NIZORAL) 2 % cream, AAA bid (Patient not taking: Reported on 1/29/2025), Disp: 60 g, Rfl: 3    olmesartan (BENICAR) 5 MG Tab, Take 1 tablet (5 mg total) by mouth once daily., Disp: 90 tablet, Rfl:  3    valACYclovir (VALTREX) 1000 MG tablet, 2 tablets every 12 hours x 1 day, Disp: 12 tablet, Rfl: 1    Lab Results   Component Value Date    WBC 6.47 12/10/2024    HGB 13.7 12/10/2024    HCT 40.5 12/10/2024     12/10/2024    CHOL 230 (H) 05/15/2024    TRIG 110 05/15/2024    HDL 55 05/15/2024    ALT 29 12/10/2024    AST 25 12/10/2024     12/10/2024    K 4.2 12/10/2024     12/10/2024    CREATININE 0.8 12/10/2024    BUN 13 12/10/2024    CO2 24 12/10/2024    TSH 0.799 11/02/2022    HGBA1C 5.2 05/15/2024       Review of Systems   Constitutional:  Negative for fatigue and fever.   HENT: Negative.  Negative for congestion, sneezing and sore throat.    Eyes: Negative.    Respiratory:  Positive for cough. Negative for shortness of breath and wheezing.    Cardiovascular:  Negative for chest pain, palpitations and leg swelling.   Gastrointestinal:  Negative for abdominal pain, nausea and vomiting.   Genitourinary: Negative.    Musculoskeletal: Negative.  Negative for arthralgias.   Skin: Negative.  Negative for rash.   Neurological:  Negative for dizziness, weakness, light-headedness, numbness and headaches.   Hematological: Negative.    Psychiatric/Behavioral: Negative.         Objective:      Physical Exam  Constitutional:       Appearance: Normal appearance.   HENT:      Head: Normocephalic and atraumatic.      Nose: Nose normal.   Eyes:      Extraocular Movements: Extraocular movements intact.   Cardiovascular:      Rate and Rhythm: Normal rate and regular rhythm.   Pulmonary:      Effort: Pulmonary effort is normal.      Breath sounds: Normal breath sounds.   Musculoskeletal:         General: Normal range of motion.      Cervical back: Normal range of motion.   Skin:     General: Skin is warm and dry.   Neurological:      General: No focal deficit present.      Mental Status: She is alert and oriented to person, place, and time.   Psychiatric:         Mood and Affect: Mood normal.         Assessment:        1. Benign essential HTN    2. Severe persistent asthma without complication    3. Fever blister    4. Immune deficiency disorder    5. Encounter for screening mammogram for malignant neoplasm of breast        Plan:       Sondra was seen today for follow-up, asthma and hypertension.    Diagnoses and all orders for this visit:    Benign essential HTN  - well controlled at home   -     olmesartan (BENICAR) 5 MG Tab; Take 1 tablet (5 mg total) by mouth once daily.  - discussed dash diet, exercise/weight loss, and increased cardiovascular exercise   - monitor BP at home w/ goal <130/80    Severe persistent asthma without complication  - continue current regimen   - Advised the patient to monitor the condition closely and report if symptoms worsen or do not improve.  - Instructed to contact the office if asthma symptoms exacerbate or fail to respond to current treatment.  - lung exam benign    Fever blister  -     valACYclovir (VALTREX) 1000 MG tablet; 2 tablets every 12 hours x 1 day    Immune deficiency disorder  - continue to follow-up with Dr. Barnett    Encounter for screening mammogram for malignant neoplasm of breast  -     Mammo Digital Screening Bilat w/ Toño; Future      - Educated the patient about the importance of regular breast cancer screening.    FOLLOW UP:  - Follow up in 6-8 months with Dr. Scott.  - Follow up in the morning for next visit to allow for fasting lipid panel on the same day.                 Portions of this note were dictated using voice recognition software and may contain dictation related errors in spelling / grammar / syntax not discovered on text review.     Lupe Johansen PA-C

## 2025-02-12 ENCOUNTER — HOSPITAL ENCOUNTER (OUTPATIENT)
Dept: RADIOLOGY | Facility: HOSPITAL | Age: 55
Discharge: HOME | End: 2025-02-12
Attending: SPECIALIST
Payer: COMMERCIAL

## 2025-02-12 DIAGNOSIS — N63.12 UNSPECIFIED LUMP IN THE RIGHT BREAST, UPPER INNER QUADRANT: Primary | ICD-10-CM

## 2025-02-12 DIAGNOSIS — N63.12 UNSPECIFIED LUMP IN THE RIGHT BREAST, UPPER INNER QUADRANT: ICD-10-CM

## 2025-02-12 PROCEDURE — 76642 ULTRASOUND BREAST LIMITED: CPT | Mod: TC,PO,RT

## 2025-02-12 PROCEDURE — 77066 DX MAMMO INCL CAD BI: CPT | Mod: TC,PO

## 2025-02-12 PROCEDURE — 77066 DX MAMMO INCL CAD BI: CPT | Mod: 26,,, | Performed by: RADIOLOGY

## 2025-02-12 PROCEDURE — 76642 ULTRASOUND BREAST LIMITED: CPT | Mod: 26,RT,, | Performed by: RADIOLOGY

## 2025-02-12 PROCEDURE — 77062 BREAST TOMOSYNTHESIS BI: CPT | Mod: 26,,, | Performed by: RADIOLOGY

## 2025-02-28 DIAGNOSIS — J30.9 CHRONIC ALLERGIC RHINITIS: ICD-10-CM

## 2025-02-28 RX ORDER — FLUTICASONE PROPIONATE 50 MCG
2 SPRAY, SUSPENSION (ML) NASAL 2 TIMES DAILY
Qty: 9.9 G | Refills: 3 | Status: SHIPPED | OUTPATIENT
Start: 2025-02-28

## 2025-03-03 ENCOUNTER — TELEPHONE (OUTPATIENT)
Dept: OPHTHALMOLOGY | Facility: CLINIC | Age: 55
End: 2025-03-03
Payer: COMMERCIAL

## 2025-03-03 NOTE — TELEPHONE ENCOUNTER
----- Message from Fatimah sent at 3/3/2025  9:23 AM CST -----  Contact: pt @ [phone]  Sondra Beltran calling regarding Appointment Access  (message) for #pt is calling to get yearly appt, asking for call back

## 2025-04-15 ENCOUNTER — OFFICE VISIT (OUTPATIENT)
Dept: OPHTHALMOLOGY | Facility: CLINIC | Age: 55
End: 2025-04-15
Payer: COMMERCIAL

## 2025-04-15 DIAGNOSIS — H26.493 BILATERAL POSTERIOR CAPSULAR OPACIFICATION: Primary | ICD-10-CM

## 2025-04-15 PROCEDURE — 99999 PR PBB SHADOW E&M-EST. PATIENT-LVL II: CPT | Mod: PBBFAC,,, | Performed by: OPHTHALMOLOGY

## 2025-04-15 PROCEDURE — 1160F RVW MEDS BY RX/DR IN RCRD: CPT | Mod: CPTII,S$GLB,, | Performed by: OPHTHALMOLOGY

## 2025-04-15 PROCEDURE — 99214 OFFICE O/P EST MOD 30 MIN: CPT | Mod: 57,S$GLB,, | Performed by: OPHTHALMOLOGY

## 2025-04-15 PROCEDURE — 66821 AFTER CATARACT LASER SURGERY: CPT | Mod: RT,S$GLB,, | Performed by: OPHTHALMOLOGY

## 2025-04-15 PROCEDURE — 1159F MED LIST DOCD IN RCRD: CPT | Mod: CPTII,S$GLB,, | Performed by: OPHTHALMOLOGY

## 2025-04-15 PROCEDURE — 4010F ACE/ARB THERAPY RXD/TAKEN: CPT | Mod: CPTII,S$GLB,, | Performed by: OPHTHALMOLOGY

## 2025-04-15 NOTE — PATIENT INSTRUCTIONS
What are floaters?     Floaters look like small specks, dots, circles, lines or cobwebs in your field of vision. While they seem to be in front of your eye, they are floating inside. Floaters are tiny clumps of gel or cells inside the vitreous that fills your eye. What you see are the shadows these clumps cast on your retina.    You usually notice floaters when looking  at something plain, like a blank wall or a blue sebas.    As we age, our vitreous starts to thicken or shrink. Sometimes clumps or strands form in the vitreous. If the vitreous pulls away from the back of the eye, it is called posterior vitreous detachment. Floaters usually happen with posterior vitreous detachment. They are not serious, and they tend to fade or go away over time. Severe floaters can be removed by surgery, but this has risks and is seldom necessary.    You are more likely to get floaters if you:    are nearsighted (you need glasses to see far away)    have had surgery for cataracts    have had inflammation (swelling) inside the eye      What are flashes?     Flashes can look like flashing lights or lightning streaks in your field of vision. Some people compare them to seeing stars after being hit on the head. You might see flashes on and off for weeks, or even months. Flashes happen when the vitreous rubs or pulls on your retina.    As people age, it is common to see flashes occasionally.       When floaters and flashes are serious     Most floaters and flashes are not a problem. However, there are times when they can be signs of a serious condition. Here is when you should call an ophthalmologist right away:    you notice a lot of new floaters    you have a lot of flashes    a shadow appears in your peripheral (side) vision    a gray curtain covers part of  your vision    These floaters and flashes could be symptoms of a torn or detached retina. This is when the retina pulls away from the back of your eye. This is a serious  condition that needs to be treated.    Summary    Floaters are dark specks or dots in your field of vision. They are shadows you see from clumps of vitreous gel in your eye. Flashes are flashes of light that look like lightning streaks in your field of vision. Flashes occur when the vitreous gel rubs or pulls on your retina.    Floaters and flashes are quite common as people age. However, they can be signs of a retinal detachment, which is a serious problem. If you suddenly have a lot of floaters and see flashes, and you notice changes in your vision, call your ophthalmologist right away.

## 2025-04-15 NOTE — PROGRESS NOTES
HPI    DLS: 12/14/2023 YAG CAP eval OU     Pt states has a film over eyes. Thinks it needs to be removed.     Denies pain/ FOL/ floaters.     Systane OU QAM           Last edited by Mirna Whiteside on 4/15/2025  2:59 PM.            Assessment /Plan     For exam results, see Encounter Report.    Bilateral posterior capsular opacification      PCO OD>OS  VS PCO  Discussed YAG Cap, RBA of procedure  Pt agrees, consent signed    YAG Cap OD today, no complications    Post op precautions reviewed, RD precautions    F/u for yag cap OS

## 2025-04-16 ENCOUNTER — TELEPHONE (OUTPATIENT)
Dept: OPTOMETRY | Facility: CLINIC | Age: 55
End: 2025-04-16
Payer: COMMERCIAL

## 2025-04-16 NOTE — TELEPHONE ENCOUNTER
Copied from CRM #[id number]. Topic: Appointments - Appointment Scheduling  >> Apr 16, 2025  9:39 AM Candice wrote:  Pt weant to see if have a eaeriler appt then 5/6/25 like a Tuesday or Thursday.  States at work so leave a msg if no answer but if no date available will keep appt 5/6/25     CALL- [phone]

## 2025-05-06 ENCOUNTER — OFFICE VISIT (OUTPATIENT)
Dept: OPHTHALMOLOGY | Facility: CLINIC | Age: 55
End: 2025-05-06
Payer: COMMERCIAL

## 2025-05-06 DIAGNOSIS — H26.492 POSTERIOR CAPSULAR OPACIFICATION, LEFT EYE: Primary | ICD-10-CM

## 2025-05-06 PROCEDURE — 66821 AFTER CATARACT LASER SURGERY: CPT | Mod: 79,LT,S$GLB, | Performed by: OPHTHALMOLOGY

## 2025-05-06 PROCEDURE — 99499 UNLISTED E&M SERVICE: CPT | Mod: S$GLB,,, | Performed by: OPHTHALMOLOGY

## 2025-05-06 PROCEDURE — 99999 PR PBB SHADOW E&M-EST. PATIENT-LVL III: CPT | Mod: PBBFAC,,, | Performed by: OPHTHALMOLOGY

## 2025-05-06 NOTE — PROGRESS NOTES
HPI    DLS: 4/15/2025 YAG MARTINO OS     Pt states no complaints after last yag cap. States eyes are doing well.    Denies pain/ FOL/ floaters.     Tears OU QAM   Last edited by Mirna Whiteside on 5/6/2025  3:00 PM.            Assessment /Plan     For exam results, see Encounter Report.    Posterior capsular opacification, left eye      Successful yag Cap OD    VS PCO OS  Discussed YAG Cap, RBA of procedure  Pt agrees, consent signed    YAG Cap OS today, no complications    Post op precautions reviewed, RD precautions    Pt to f/u with regular optometrist for routine exams    F/u me PRN

## 2025-06-05 ENCOUNTER — TELEPHONE (OUTPATIENT)
Dept: OPHTHALMOLOGY | Facility: CLINIC | Age: 55
End: 2025-06-05
Payer: COMMERCIAL

## 2025-06-20 ENCOUNTER — OFFICE VISIT (OUTPATIENT)
Dept: PODIATRY | Facility: CLINIC | Age: 55
End: 2025-06-20
Payer: COMMERCIAL

## 2025-06-20 VITALS — BODY MASS INDEX: 34.33 KG/M2 | WEIGHT: 201.06 LBS | OXYGEN SATURATION: 99 % | HEIGHT: 64 IN

## 2025-06-20 DIAGNOSIS — M21.621 TAILOR'S BUNION OF RIGHT FOOT: ICD-10-CM

## 2025-06-20 DIAGNOSIS — M79.671 RIGHT FOOT PAIN: ICD-10-CM

## 2025-06-20 DIAGNOSIS — L85.1 ACQUIRED KERATODERMA: Primary | ICD-10-CM

## 2025-06-20 PROCEDURE — 99999 PR PBB SHADOW E&M-EST. PATIENT-LVL IV: CPT | Mod: PBBFAC,,, | Performed by: PODIATRIST

## 2025-06-20 NOTE — PROGRESS NOTES
"  [address]  [place] [address]  Phone: [phone]   [phone]    Patient's PCP:Zahra Scott MD  Referring Provider: No ref. provider found    Subjective:      Chief Complaint:: Foot Pain (Left foot ball of the foot)    Foot Pain  Associated symptoms include arthralgias. Pertinent negatives include no abdominal pain, chest pain, chills, coughing, fatigue, fever, headaches, joint swelling, myalgias, nausea, neck pain, numbness, rash or weakness.     Sondra Beltran is a 55 y.o. female who presents today with a complaint of left foot pain, ball of foot. The current episode started 3 months.  The symptoms include swelling, burning pain. Probable cause of complaint fractured foot 1.5 years ago.  The symptoms are aggravated by prolonged standing, pt states she is standing 9 hours per day. The problem has worsened. Treatment to date have included none which provided no relief.     Vitals:    06/20/25 0806   SpO2: 99%   Weight: 91.2 kg (201 lb 1 oz)   Height: 5' 4" (1.626 m)   PainSc:   4      Shoe Size: 7.5    Past Surgical History:   Procedure Laterality Date    CATARACT EXTRACTION W/  INTRAOCULAR LENS IMPLANT Left 10/25/2023    Procedure: CEIOL OS;  Surgeon: Suhail Espinal MD;  Location: [facility];  Service: Ophthalmology;  Laterality: Left;    CATARACT EXTRACTION W/  INTRAOCULAR LENS IMPLANT Right 11/15/2023    Procedure: CEIOL OD;  Surgeon: Suhail Espinal MD;  Location: [facility] OR;  Service: Ophthalmology;  Laterality: Right;    cervial cone surgery      CLOSURE OF VESICOVAGINAL FISTULA N/A 06/06/2019    Procedure: excision of vaginal lesion vs urethral diverticulectomy;  Surgeon: Sondra Cobb MD;  Location: [facility] OR;  Service: Urology;  Laterality: N/A;   to assist, first case, please put  as co-surgeon    COLONOSCOPY N/A 5/29/2023    Procedure: COLONOSCOPY;  Surgeon: Yimi Gan MD;  Location: [facility];  Service: Endoscopy;  Laterality: " N/A;    CYSTOSCOPY N/A 04/22/2019    Procedure: CYSTOSCOPY;  Surgeon: Sondra Cobb MD;  Location: [facility] OR;  Service: Urology;  Laterality: N/A;    HYSTERECTOMY      KNEE SURGERY      nose surgery      VAGINOSCOPY N/A 04/22/2019    Procedure: VAGINOSCOPY;  Surgeon: Sondra Cobb MD;  Location: [facility] OR;  Service: Urology;  Laterality: N/A;     Past Medical History:   Diagnosis Date    Asthma     Last ER visit approx 2010. usually after illness    Benign essential HTN 06/19/2017    CVID (common variable immunodeficiency)     Neck strain 05/17/2016    Thyroid nodule 06/28/2017     Family History   Problem Relation Name Age of Onset    Diabetes Mother      Hypertension Mother      Heart disease Mother      Rheum arthritis Mother      Hyperlipidemia Mother      Heart disease Father      Hypertension Father      Hyperlipidemia Father      Bladder Cancer Father      Cancer Paternal Aunt      Cancer Paternal Grandmother      Glaucoma Neg Hx      Macular degeneration Neg Hx          Social History:   Marital Status:   Alcohol History:  reports no history of alcohol use.  Tobacco History:  reports that she has never smoked. She has never been exposed to tobacco smoke. She has never used smokeless tobacco.  Drug History:  reports no history of drug use.    Review of patient's allergies indicates:   Allergen Reactions    Sulfamethoxazole-trimethoprim Rash and Hives       Current Medications[1]    Review of Systems   Constitutional:  Negative for chills, fatigue, fever and unexpected weight change.   HENT:  Negative for hearing loss and trouble swallowing.    Eyes:  Negative for photophobia and visual disturbance.   Respiratory:  Negative for cough, shortness of breath and wheezing.    Cardiovascular:  Negative for chest pain, palpitations and leg swelling.   Gastrointestinal:  Negative for abdominal pain and nausea.   Genitourinary:  Negative for dysuria and frequency.   Musculoskeletal:  Positive  for arthralgias. Negative for back pain, gait problem, joint swelling, myalgias and neck pain.   Skin:  Negative for rash and wound.   Neurological:  Negative for tremors, seizures, weakness, numbness and headaches.   Hematological:  Does not bruise/bleed easily.   Psychiatric/Behavioral:  Negative for hallucinations.          Objective:        Physical Exam:   Foot Exam    General  General Appearance: appears stated age and healthy   Orientation: alert and oriented to person, place, and time   Affect: appropriate   Gait: unimpaired       Left Foot/Ankle      Inspection and Palpation  Ecchymosis: none  Tenderness: (5th MPJ)  Swelling: none   Arch: normal  Hammertoes: absent  Claw toes: absent  Hallux valgus: no  Hallux limitus: no  Skin Exam: callus;   Neurovascular  Dorsalis pedis: 2+  Posterior tibial: 2+  Capillary refill: 2+  Varicose veins: not present  Saphenous nerve sensation: normal  Tibial nerve sensation: normal  Superficial peroneal nerve sensation: normal  Deep peroneal nerve sensation: normal  Sural nerve sensation: normal    Muscle Strength  Ankle dorsiflexion: 5  Ankle plantar flexion: 5  Ankle inversion: 5  Ankle eversion: 5  Great toe extension: 5  Great toe flexion: 5    Comments  Several small nucleated keratosis plantar 5th metatarsal head.  Very mild tailor's bunion deformity is present.  There is slight dorsiflexion of the 5th toe at the MPJ    Physical Exam  Cardiovascular:      Pulses:           Dorsalis pedis pulses are 2+ on the left side.        Posterior tibial pulses are 2+ on the left side.   Musculoskeletal:      Left foot: No bunion.   Feet:      Left foot:      Skin integrity: Callus present.               Left Ankle/Foot Exam     Comments:  Several small nucleated keratosis plantar 5th metatarsal head.  Very mild tailor's bunion deformity is present.  There is slight dorsiflexion of the 5th toe at the MPJ      Muscle Strength   Left Lower Extremity   Ankle Dorsiflexion:  5    Plantar flexion:  5/5     Vascular Exam       Left Pulses  Dorsalis Pedis:      2+  Posterior Tibial:      2+           Imaging: none            Assessment:       1. Acquired keratoderma    2. Tailor's bunion of right foot    3. Right foot pain      Plan:   Acquired keratoderma    Tailor's bunion of right foot    Right foot pain      Follow up if symptoms worsen or fail to improve.    Procedures        I discussed the patient findings of several nucleated keratosis under the 5th metatarsal head due to her very mild tailor's bunion as well as slight dorsiflexion of the 5th toe.  I trimmed the calluses as a courtesy.  Recommend urea cream 40% for continued management.  I am also going to give her a note to allow for running shoes and seated duty as necessary at work to help limit pressure on the area.    Counseling:     I provided patient education verbally regarding:   Patient diagnosis, treatment options, as well as alternatives, risks, and benefits.     This note was created using Dragon voice recognition software that occasionally misinterpreted phrases or words.                    [1]   Current Outpatient Medications   Medication Sig Dispense Refill    albuterol (ACCUNEB) 1.25 mg/3 mL Nebu Inhale 6 mL 3 times a day by inhalation route.      albuterol sulfate (INV ALBUTEROL) 90 mcg inhalation Inhale into the lungs as needed. Take one puff by mouth as directed by Physician. Investigational Medication. Patient Study#    .      ascorbic acid, vitamin C, (VITAMIN C) 500 MG tablet Take 500 mg by mouth 3 (three) times daily.      azelastine (ASTELIN) 137 mcg (0.1 %) nasal spray SMARTSIG:Both Nares  Strength: 137 mcg (0.1 %) (Patient taking differently: 1 spray Daily. SMARTSIG:Both Nares  Strength: 137 mcg (0.1 %)) 30 mL 3    budesonide-formoterol 160-4.5 mcg (SYMBICORT) 160-4.5 mcg/actuation HFAA Inhale 2 puffs into the lungs every 12 (twelve) hours. Controller 6 g 3    fluticasone propionate (FLONASE) 50  mcg/actuation nasal spray 2 sprays (100 mcg total) by Each Nostril route 2 (two) times daily. 9.9 g 3    guaiFENesin (MUCINEX) 600 mg 12 hr tablet Take 2 tablets (1,200 mg total) by mouth 2 (two) times daily. (Patient not taking: Reported on 1/29/2025) 30 tablet 0    ketoconazole (NIZORAL) 2 % cream AAA bid (Patient not taking: Reported on 1/29/2025) 60 g 3    levocetirizine (XYZAL) 5 MG tablet Take 5 mg by mouth every evening.      levocetirizine (XYZAL) 5 MG tablet Take 1 tablet (5 mg total) by mouth every evening. 30 tablet 0    montelukast (SINGULAIR) 10 mg tablet Take 10 mg by mouth once daily.      nystatin (MYCOSTATIN) powder Apply topically 2 (two) times daily. (Patient taking differently: Apply topically as needed.) 15 g 0    olmesartan (BENICAR) 5 MG Tab Take 1 tablet (5 mg total) by mouth once daily. 90 tablet 3    promethazine-dextromethorphan (PROMETHAZINE-DM) 6.25-15 mg/5 mL Syrp       selenium 50 mcg Tab Take 200 mcg by mouth once daily.      tiotropium bromide (SPIRIVA RESPIMAT) 1.25 mcg/actuation inhaler Inhale 2 puffs into the lungs once daily. Controller 4 g 3    valACYclovir (VALTREX) 1000 MG tablet 2 tablets every 12 hours x 1 day 12 tablet 1     No current facility-administered medications for this visit.

## 2025-06-20 NOTE — LETTER
June 20, 2025      [facility]  [address]  [address]  [place] [address]  Phone: [phone]  Fax: [phone]       Patient: Sondra Beltran   YOB: 1970  Date of Visit: 06/20/2025    To Whom It May Concern:    Felicity Beltran  was at [facility] on 06/20/2025. The patient may return to work on 6/20/2025 with restrictions of seated duty and wearing tennis shoes at all times. If you have any questions or concerns, or if I can be of further assistance, please do not hesitate to contact me.    Sincerely,    Electronically Signed by: Marcial Roy DPM

## 2025-07-30 ENCOUNTER — OFFICE VISIT (OUTPATIENT)
Dept: PODIATRY | Facility: CLINIC | Age: 55
End: 2025-07-30
Payer: COMMERCIAL

## 2025-07-30 VITALS — HEART RATE: 72 BPM | HEIGHT: 64 IN | BODY MASS INDEX: 33.88 KG/M2 | OXYGEN SATURATION: 98 % | WEIGHT: 198.44 LBS

## 2025-07-30 DIAGNOSIS — M79.671 RIGHT FOOT PAIN: ICD-10-CM

## 2025-07-30 DIAGNOSIS — D23.71 BENIGN NEOPLASM OF SKIN OF RIGHT LOWER EXTREMITY, INCLUDING HIP: Primary | ICD-10-CM

## 2025-07-30 PROCEDURE — 99999 PR PBB SHADOW E&M-EST. PATIENT-LVL IV: CPT | Mod: PBBFAC,,, | Performed by: PODIATRIST

## 2025-07-30 NOTE — PROGRESS NOTES
"  [address] [place] [address]  Phone: [phone]   [phone]    Patient's PCP:Zahra Scott MD  Referring Provider: No ref. provider found    Subjective:      Chief Complaint:: Follow-up (Left foot pain )    Follow-up  Pertinent negatives include no abdominal pain, arthralgias, chest pain, chills, coughing, fatigue, fever, headaches, joint swelling, myalgias, nausea, neck pain, numbness, rash or weakness.     Sondra Beltran is a 55 y.o. female who presents today with a follow up on left foot pain. Pt has pain in the lateral side.  She states that she feels as his shoes walking on a rock.  Patient has been using urea cream at home without relief.      Vitals:    07/30/25 1351   Pulse: 72   SpO2: 98%   Weight: 90 kg (198 lb 6.6 oz)   Height: 5' 4" (1.626 m)   PainSc:   5      Shoe Size: 7.5    Past Surgical History:   Procedure Laterality Date    CATARACT EXTRACTION W/  INTRAOCULAR LENS IMPLANT Left 10/25/2023    Procedure: CEIOL OS;  Surgeon: Suhail Espinal MD;  Location: [facility] OR;  Service: Ophthalmology;  Laterality: Left;    CATARACT EXTRACTION W/  INTRAOCULAR LENS IMPLANT Right 11/15/2023    Procedure: CEIOL OD;  Surgeon: Suhail Espinal MD;  Location: [facility] OR;  Service: Ophthalmology;  Laterality: Right;    cervial cone surgery      CLOSURE OF VESICOVAGINAL FISTULA N/A 06/06/2019    Procedure: excision of vaginal lesion vs urethral diverticulectomy;  Surgeon: Sondra Cobb MD;  Location: [facility] OR;  Service: Urology;  Laterality: N/A;   to assist, first case, please put  as co-surgeon    COLONOSCOPY N/A 5/29/2023    Procedure: COLONOSCOPY;  Surgeon: Yimi Gan MD;  Location: [facility];  Service: Endoscopy;  Laterality: N/A;    CYSTOSCOPY N/A 04/22/2019    Procedure: CYSTOSCOPY;  Surgeon: Sondra Cobb MD;  Location: [facility] OR;  Service: Urology;  Laterality: N/A;    HYSTERECTOMY      KNEE SURGERY      nose surgery      " VAGINOSCOPY N/A 04/22/2019    Procedure: VAGINOSCOPY;  Surgeon: Sondra Cobb MD;  Location: [facility] OR;  Service: Urology;  Laterality: N/A;     Past Medical History:   Diagnosis Date    Asthma     Last ER visit approx 2010. usually after illness    Benign essential HTN 06/19/2017    CVID (common variable immunodeficiency)     Neck strain 05/17/2016    Thyroid nodule 06/28/2017     Family History   Problem Relation Name Age of Onset    Diabetes Mother      Hypertension Mother      Heart disease Mother      Rheum arthritis Mother      Hyperlipidemia Mother      Heart disease Father      Hypertension Father      Hyperlipidemia Father      Bladder Cancer Father      Cancer Paternal Aunt      Cancer Paternal Grandmother      Glaucoma Neg Hx      Macular degeneration Neg Hx          Social History:   Marital Status:   Alcohol History:  reports no history of alcohol use.  Tobacco History:  reports that she has never smoked. She has never been exposed to tobacco smoke. She has never used smokeless tobacco.  Drug History:  reports no history of drug use.    Review of patient's allergies indicates:   Allergen Reactions    Sulfamethoxazole-trimethoprim Rash and Hives       Current Medications[1]    Review of Systems   Constitutional:  Negative for chills, fatigue, fever and unexpected weight change.   HENT:  Negative for hearing loss and trouble swallowing.    Eyes:  Negative for photophobia and visual disturbance.   Respiratory:  Negative for cough, shortness of breath and wheezing.    Cardiovascular:  Negative for chest pain, palpitations and leg swelling.   Gastrointestinal:  Negative for abdominal pain and nausea.   Genitourinary:  Negative for dysuria and frequency.   Musculoskeletal:  Negative for arthralgias, back pain, gait problem, joint swelling, myalgias and neck pain.   Skin:  Negative for rash and wound.   Neurological:  Negative for tremors, seizures, weakness, numbness and headaches.    Hematological:  Does not bruise/bleed easily.         Objective:        Physical Exam:   Foot Exam    General  General Appearance: appears stated age and healthy   Orientation: alert and oriented to person, place, and time   Affect: appropriate   Gait: unimpaired       Left Foot/Ankle      Inspection and Palpation  Ecchymosis: none  Tenderness: (Fifth metatarsal head)  Swelling: none   Arch: normal  Hammertoes: absent  Claw toes: absent  Hallux valgus: no  Hallux limitus: no  Skin Exam: callus;   Neurovascular  Dorsalis pedis: 2+  Posterior tibial: 2+  Capillary refill: 2+  Varicose veins: not present  Saphenous nerve sensation: normal  Tibial nerve sensation: normal  Superficial peroneal nerve sensation: normal  Deep peroneal nerve sensation: normal  Sural nerve sensation: normal    Muscle Strength  Ankle dorsiflexion: 5  Ankle plantar flexion: 5  Ankle inversion: 5  Ankle eversion: 5  Great toe extension: 5  Great toe flexion: 5    Comments  Several small nucleated keratosis plantar 5th metatarsal head.  Very mild tailor's bunion deformity is present.  There is slight dorsiflexion of the 5th toe at the MPJ    Physical Exam  Cardiovascular:      Pulses:           Dorsalis pedis pulses are 2+ on the left side.        Posterior tibial pulses are 2+ on the left side.   Musculoskeletal:      Left foot: No bunion.        Feet:    Feet:      Left foot:      Skin integrity: Callus present.               Left Ankle/Foot Exam     Comments:  Several small nucleated keratosis plantar 5th metatarsal head.  Very mild tailor's bunion deformity is present.  There is slight dorsiflexion of the 5th toe at the MPJ      Muscle Strength   Left Lower Extremity   Ankle Dorsiflexion:  5   Plantar flexion:  5/5     Vascular Exam       Left Pulses  Dorsalis Pedis:      2+  Posterior Tibial:      2+           Imaging:  None           Assessment:       1. Benign neoplasm of skin of right lower extremity, including hip    2. Right foot pain       Plan:   Benign neoplasm of skin of right lower extremity, including hip    Right foot pain      Follow up if symptoms worsen or fail to improve.    I discussed with the patient findings of benign keratotic skin lesion to the plantar left foot likely porokeratosis.  We discussed different ongoing treatment options consisting of continued home care with pumice stone and urea cream versus acid application.  We are going to do an acid application today.    Procedures Trichloracetic treatment of benign skin lesions - plantar left 5th MPJ. Informed consent was obtained, a time-out was called, hyperkeratotic skin was debrided from each lesion utilizing a scapel, trichloracetic acid was applied, and was covered with a Band-Aid. Written and verbal instructions were given to the patient. Patient tolerated the procedure well.     Instructions After trichloracetic Acid Treatment    Keep dry for 3 days  If a blister forms, pop it  After 3rd day, begin wart stick twice daily for two week  Follow-up appointment 2 weeks      Wart Treatment: Twice Daily    Bathe area at night  File with pumice stone or nail file  Apply wart stick to affected area  Cover with a bad aid            Counseling:     I provided patient education verbally regarding:   Patient diagnosis, treatment options, as well as alternatives, risks, and benefits.     This note was created using Dragon voice recognition software that occasionally misinterpreted phrases or words.                    [1]   Current Outpatient Medications   Medication Sig Dispense Refill    albuterol (ACCUNEB) 1.25 mg/3 mL Nebu Inhale 6 mL 3 times a day by inhalation route.      albuterol sulfate (INV ALBUTEROL) 90 mcg inhalation Inhale into the lungs as needed. Take one puff by mouth as directed by Physician. Investigational Medication. Patient Study#    .      ascorbic acid, vitamin C, (VITAMIN C) 500 MG tablet Take 500 mg by mouth 3 (three) times daily.      azelastine (ASTELIN) 137  mcg (0.1 %) nasal spray SMARTSIG:Both Nares  Strength: 137 mcg (0.1 %) (Patient taking differently: 1 spray Daily. SMARTSIG:Both Nares  Strength: 137 mcg (0.1 %)) 30 mL 3    budesonide-formoterol 160-4.5 mcg (SYMBICORT) 160-4.5 mcg/actuation HFAA Inhale 2 puffs into the lungs every 12 (twelve) hours. Controller 6 g 3    fluticasone propionate (FLONASE) 50 mcg/actuation nasal spray 2 sprays (100 mcg total) by Each Nostril route 2 (two) times daily. 9.9 g 3    levocetirizine (XYZAL) 5 MG tablet Take 5 mg by mouth every evening.      montelukast (SINGULAIR) 10 mg tablet Take 10 mg by mouth once daily.      nystatin (MYCOSTATIN) powder Apply topically 2 (two) times daily. (Patient taking differently: Apply topically as needed.) 15 g 0    olmesartan (BENICAR) 5 MG Tab Take 1 tablet (5 mg total) by mouth once daily. 90 tablet 3    promethazine-dextromethorphan (PROMETHAZINE-DM) 6.25-15 mg/5 mL Syrp       selenium 50 mcg Tab Take 200 mcg by mouth once daily.      tiotropium bromide (SPIRIVA RESPIMAT) 1.25 mcg/actuation inhaler Inhale 2 puffs into the lungs once daily. Controller 4 g 3    valACYclovir (VALTREX) 1000 MG tablet 2 tablets every 12 hours x 1 day 12 tablet 1    guaiFENesin (MUCINEX) 600 mg 12 hr tablet Take 2 tablets (1,200 mg total) by mouth 2 (two) times daily. (Patient not taking: Reported on 7/30/2025) 30 tablet 0    ketoconazole (NIZORAL) 2 % cream AAA bid (Patient not taking: Reported on 7/30/2025) 60 g 3     No current facility-administered medications for this visit.

## 2025-08-07 ENCOUNTER — OFFICE VISIT (OUTPATIENT)
Dept: FAMILY MEDICINE | Facility: CLINIC | Age: 55
End: 2025-08-07
Payer: COMMERCIAL

## 2025-08-07 ENCOUNTER — LAB VISIT (OUTPATIENT)
Dept: LAB | Facility: HOSPITAL | Age: 55
End: 2025-08-07
Attending: STUDENT IN AN ORGANIZED HEALTH CARE EDUCATION/TRAINING PROGRAM
Payer: COMMERCIAL

## 2025-08-07 VITALS
DIASTOLIC BLOOD PRESSURE: 70 MMHG | HEART RATE: 75 BPM | WEIGHT: 199.31 LBS | SYSTOLIC BLOOD PRESSURE: 118 MMHG | BODY MASS INDEX: 34.03 KG/M2 | OXYGEN SATURATION: 97 % | HEIGHT: 64 IN

## 2025-08-07 DIAGNOSIS — J30.9 CHRONIC ALLERGIC RHINITIS: ICD-10-CM

## 2025-08-07 DIAGNOSIS — Z13.1 SCREENING FOR DIABETES MELLITUS (DM): ICD-10-CM

## 2025-08-07 DIAGNOSIS — I10 BENIGN ESSENTIAL HTN: ICD-10-CM

## 2025-08-07 DIAGNOSIS — Z13.6 ENCOUNTER FOR LIPID SCREENING FOR CARDIOVASCULAR DISEASE: ICD-10-CM

## 2025-08-07 DIAGNOSIS — Z00.00 ROUTINE GENERAL MEDICAL EXAMINATION AT A HEALTH CARE FACILITY: Primary | ICD-10-CM

## 2025-08-07 DIAGNOSIS — D83.9 CVID (COMMON VARIABLE IMMUNODEFICIENCY): ICD-10-CM

## 2025-08-07 DIAGNOSIS — Z13.29 SCREENING FOR THYROID DISORDER: ICD-10-CM

## 2025-08-07 DIAGNOSIS — Z00.00 ROUTINE GENERAL MEDICAL EXAMINATION AT A HEALTH CARE FACILITY: ICD-10-CM

## 2025-08-07 DIAGNOSIS — J45.50 SEVERE PERSISTENT ASTHMA WITHOUT COMPLICATION: ICD-10-CM

## 2025-08-07 DIAGNOSIS — Z13.220 ENCOUNTER FOR LIPID SCREENING FOR CARDIOVASCULAR DISEASE: ICD-10-CM

## 2025-08-07 DIAGNOSIS — N60.01 BREAST CYST, RIGHT: ICD-10-CM

## 2025-08-07 LAB
ABSOLUTE EOSINOPHIL (OHS): 0.12 K/UL
ABSOLUTE MONOCYTE (OHS): 0.38 K/UL (ref 0.3–1)
ABSOLUTE NEUTROPHIL COUNT (OHS): 2.62 K/UL (ref 1.8–7.7)
ALBUMIN SERPL BCP-MCNC: 3.6 G/DL (ref 3.5–5.2)
ALBUMIN/CREAT UR: 4.9 UG/MG
ALP SERPL-CCNC: 89 UNIT/L (ref 40–150)
ALT SERPL W/O P-5'-P-CCNC: 36 UNIT/L (ref 0–55)
ANION GAP (OHS): 8 MMOL/L (ref 8–16)
AST SERPL-CCNC: 38 UNIT/L (ref 0–50)
BASOPHILS # BLD AUTO: 0.03 K/UL
BASOPHILS NFR BLD AUTO: 0.6 %
BILIRUB SERPL-MCNC: 0.7 MG/DL (ref 0.1–1)
BUN SERPL-MCNC: 13 MG/DL (ref 6–20)
CALCIUM SERPL-MCNC: 9.1 MG/DL (ref 8.7–10.5)
CHLORIDE SERPL-SCNC: 106 MMOL/L (ref 95–110)
CHOLEST SERPL-MCNC: 198 MG/DL (ref 120–199)
CHOLEST/HDLC SERPL: 4.1 {RATIO} (ref 2–5)
CO2 SERPL-SCNC: 24 MMOL/L (ref 23–29)
CREAT SERPL-MCNC: 0.7 MG/DL (ref 0.5–1.4)
CREAT UR-MCNC: 144 MG/DL (ref 15–325)
EAG (OHS): 91 MG/DL (ref 68–131)
ERYTHROCYTE [DISTWIDTH] IN BLOOD BY AUTOMATED COUNT: 13.2 % (ref 11.5–14.5)
GFR SERPLBLD CREATININE-BSD FMLA CKD-EPI: >60 ML/MIN/1.73/M2
GLUCOSE SERPL-MCNC: 88 MG/DL (ref 70–110)
HBA1C MFR BLD: 4.8 % (ref 4–5.6)
HCT VFR BLD AUTO: 40.1 % (ref 37–48.5)
HDLC SERPL-MCNC: 48 MG/DL (ref 40–75)
HDLC SERPL: 24.2 % (ref 20–50)
HGB BLD-MCNC: 13.1 GM/DL (ref 12–16)
IMM GRANULOCYTES # BLD AUTO: 0.02 K/UL (ref 0–0.04)
IMM GRANULOCYTES NFR BLD AUTO: 0.4 % (ref 0–0.5)
LDLC SERPL CALC-MCNC: 111.4 MG/DL (ref 63–159)
LYMPHOCYTES # BLD AUTO: 2.17 K/UL (ref 1–4.8)
MCH RBC QN AUTO: 29.8 PG (ref 27–31)
MCHC RBC AUTO-ENTMCNC: 32.7 G/DL (ref 32–36)
MCV RBC AUTO: 91 FL (ref 82–98)
MICROALBUMIN UR-MCNC: 7 UG/ML (ref ?–5000)
NONHDLC SERPL-MCNC: 150 MG/DL
NUCLEATED RBC (/100WBC) (OHS): 0 /100 WBC
PLATELET # BLD AUTO: 201 K/UL (ref 150–450)
PMV BLD AUTO: 12.1 FL (ref 9.2–12.9)
POTASSIUM SERPL-SCNC: 4.4 MMOL/L (ref 3.5–5.1)
PROT SERPL-MCNC: 7.3 GM/DL (ref 6–8.4)
RBC # BLD AUTO: 4.39 M/UL (ref 4–5.4)
RELATIVE EOSINOPHIL (OHS): 2.2 %
RELATIVE LYMPHOCYTE (OHS): 40.6 % (ref 18–48)
RELATIVE MONOCYTE (OHS): 7.1 % (ref 4–15)
RELATIVE NEUTROPHIL (OHS): 49.1 % (ref 38–73)
SODIUM SERPL-SCNC: 138 MMOL/L (ref 136–145)
TRIGL SERPL-MCNC: 193 MG/DL (ref 30–150)
TSH SERPL-ACNC: 1.09 UIU/ML (ref 0.4–4)
WBC # BLD AUTO: 5.34 K/UL (ref 3.9–12.7)

## 2025-08-07 PROCEDURE — G2211 COMPLEX E/M VISIT ADD ON: HCPCS | Mod: S$GLB,,, | Performed by: STUDENT IN AN ORGANIZED HEALTH CARE EDUCATION/TRAINING PROGRAM

## 2025-08-07 PROCEDURE — 80053 COMPREHEN METABOLIC PANEL: CPT

## 2025-08-07 PROCEDURE — 3008F BODY MASS INDEX DOCD: CPT | Mod: CPTII,S$GLB,, | Performed by: STUDENT IN AN ORGANIZED HEALTH CARE EDUCATION/TRAINING PROGRAM

## 2025-08-07 PROCEDURE — 83036 HEMOGLOBIN GLYCOSYLATED A1C: CPT

## 2025-08-07 PROCEDURE — 1159F MED LIST DOCD IN RCRD: CPT | Mod: CPTII,S$GLB,, | Performed by: STUDENT IN AN ORGANIZED HEALTH CARE EDUCATION/TRAINING PROGRAM

## 2025-08-07 PROCEDURE — 82043 UR ALBUMIN QUANTITATIVE: CPT

## 2025-08-07 PROCEDURE — 1160F RVW MEDS BY RX/DR IN RCRD: CPT | Mod: CPTII,S$GLB,, | Performed by: STUDENT IN AN ORGANIZED HEALTH CARE EDUCATION/TRAINING PROGRAM

## 2025-08-07 PROCEDURE — 99999 PR PBB SHADOW E&M-EST. PATIENT-LVL IV: CPT | Mod: PBBFAC,,, | Performed by: STUDENT IN AN ORGANIZED HEALTH CARE EDUCATION/TRAINING PROGRAM

## 2025-08-07 PROCEDURE — 36415 COLL VENOUS BLD VENIPUNCTURE: CPT | Mod: PO

## 2025-08-07 PROCEDURE — 84443 ASSAY THYROID STIM HORMONE: CPT

## 2025-08-07 PROCEDURE — 99214 OFFICE O/P EST MOD 30 MIN: CPT | Mod: S$GLB,,, | Performed by: STUDENT IN AN ORGANIZED HEALTH CARE EDUCATION/TRAINING PROGRAM

## 2025-08-07 PROCEDURE — 4010F ACE/ARB THERAPY RXD/TAKEN: CPT | Mod: CPTII,S$GLB,, | Performed by: STUDENT IN AN ORGANIZED HEALTH CARE EDUCATION/TRAINING PROGRAM

## 2025-08-07 PROCEDURE — 3074F SYST BP LT 130 MM HG: CPT | Mod: CPTII,S$GLB,, | Performed by: STUDENT IN AN ORGANIZED HEALTH CARE EDUCATION/TRAINING PROGRAM

## 2025-08-07 PROCEDURE — 3044F HG A1C LEVEL LT 7.0%: CPT | Mod: CPTII,S$GLB,, | Performed by: STUDENT IN AN ORGANIZED HEALTH CARE EDUCATION/TRAINING PROGRAM

## 2025-08-07 PROCEDURE — 85025 COMPLETE CBC W/AUTO DIFF WBC: CPT

## 2025-08-07 PROCEDURE — 80061 LIPID PANEL: CPT

## 2025-08-07 PROCEDURE — 3066F NEPHROPATHY DOC TX: CPT | Mod: CPTII,S$GLB,, | Performed by: STUDENT IN AN ORGANIZED HEALTH CARE EDUCATION/TRAINING PROGRAM

## 2025-08-07 PROCEDURE — 3061F NEG MICROALBUMINURIA REV: CPT | Mod: CPTII,S$GLB,, | Performed by: STUDENT IN AN ORGANIZED HEALTH CARE EDUCATION/TRAINING PROGRAM

## 2025-08-07 PROCEDURE — 3078F DIAST BP <80 MM HG: CPT | Mod: CPTII,S$GLB,, | Performed by: STUDENT IN AN ORGANIZED HEALTH CARE EDUCATION/TRAINING PROGRAM

## 2025-08-07 RX ORDER — IMMUNE GLOBULIN INFUSION (HUMAN) 100 MG/ML
1 INJECTION, SOLUTION INTRAVENOUS; SUBCUTANEOUS
COMMUNITY

## 2025-08-07 RX ORDER — TEZEPELUMAB-EKKO 210 MG/1.9ML
210 INJECTION, SOLUTION SUBCUTANEOUS
COMMUNITY

## 2025-08-07 NOTE — PROGRESS NOTES
"[facility]  Office Visit Note     SUBJECTIVE:   HPI: Sondra Beltran  is a 55 y.o. female     Last visit 2024     Medical conditions notable for severe persistent asthma, lung nodules, HTN, CVID, obesity, thyroid nodule     Meds: albuterol, symbicort, flonase, levocetrizine, astelin nasal spray, singulair, olmesartan, spiriva     Specialists:  - Dr. Maria Barnett -- allergy/immunology for CVID   - ob/gyn for women's care including pap smear.        Partial hysterectomy more than 10 years ago for fibroids     Patient works as a teller at a [employer]   Per patient, HTN and asthma have been well controlled   No acute complaints today       Gammagard for CVID  Tezspire for asthma     History of Present Illness    CHIEF COMPLAINT:  Patient presents today for follow up.    ASTHMA:  She reports current asthma exacerbation triggered by heat, experiencing shortness of breath and random coughing episodes. She describes being "short-winded" and is managing symptoms by using her inhaler. She continues regular asthma medications including Symbicort.    COMMON VARIABLE IMMUNODEFICIENCY:  She has a diagnosis of Common Variable Immunodeficiency (CVID), an immunological condition affecting her immune system. She appears knowledgeable about her medical condition.    RECENT PROCEDURES:  She underwent cataract surgeries this year followed by laser treatment on her eyes and obtained a new prescription following the eye procedures. Breast imaging in February revealed a cyst on the right breast with follow-up ultrasound scheduled for .    CURRENT MEDICATIONS:  She continues monthly Gamagard infusion, monthly Tezspire 5 mg shots for asthma, Albuterol as needed, Spiriva, Symbicort, Flonase, Astelin spray, daily Allegra D for allergies, and Olmesartan 5 mg for blood pressure management. She reports no recent medication discontinuations.          Lab Visit on 2025   Component Date Value " Ref Range Status    Sodium 08/07/2025 138  136 - 145 mmol/L Final    Potassium 08/07/2025 4.4  3.5 - 5.1 mmol/L Final    Chloride 08/07/2025 106  95 - 110 mmol/L Final    CO2 08/07/2025 24  23 - 29 mmol/L Final    Glucose 08/07/2025 88  70 - 110 mg/dL Final    BUN 08/07/2025 13  6 - 20 mg/dL Final    Creatinine 08/07/2025 0.7  0.5 - 1.4 mg/dL Final    Calcium 08/07/2025 9.1  8.7 - 10.5 mg/dL Final    Protein Total 08/07/2025 7.3  6.0 - 8.4 gm/dL Final    Albumin 08/07/2025 3.6  3.5 - 5.2 g/dL Final    Bilirubin Total 08/07/2025 0.7  0.1 - 1.0 mg/dL Final    ALP 08/07/2025 89  40 - 150 unit/L Final    AST 08/07/2025 38  0 - 50 unit/L Final    ALT 08/07/2025 36  0 - 55 unit/L Final    Anion Gap 08/07/2025 8  8 - 16 mmol/L Final    eGFR 08/07/2025 >60  >60 mL/min/1.73/m2 Final    Hemoglobin A1c 08/07/2025 4.8  4.0 - 5.6 % Final    Estimated Average Glucose 08/07/2025 91  68 - 131 mg/dL Final    Cholesterol Total 08/07/2025 198  120 - 199 mg/dL Final    Triglyceride 08/07/2025 193 (H)  30 - 150 mg/dL Final    HDL Cholesterol 08/07/2025 48  40 - 75 mg/dL Final    LDL Cholesterol 08/07/2025 111.4  63.0 - 159.0 mg/dL Final    HDL/Cholesterol Ratio 08/07/2025 24.2  20.0 - 50.0 % Final    Cholesterol/HDL Ratio 08/07/2025 4.1  2.0 - 5.0 Final    Non HDL Cholesterol 08/07/2025 150  mg/dL Final    Urine Microalbumin 08/07/2025 7.0    ug/mL Final    Urine Creatinine 08/07/2025 144.0  15.0 - 325.0 mg/dL Final    Microalbumin/Creatinine Ratio Urine 08/07/2025 4.9  <=30.0 ug/mg Final    TSH 08/07/2025 1.086  0.400 - 4.000 uIU/mL Final    WBC 08/07/2025 5.34  3.90 - 12.70 K/uL Final    RBC 08/07/2025 4.39  4.00 - 5.40 M/uL Final    HGB 08/07/2025 13.1  12.0 - 16.0 gm/dL Final    HCT 08/07/2025 40.1  37.0 - 48.5 % Final    MCV 08/07/2025 91  82 - 98 fL Final    MCH 08/07/2025 29.8  27.0 - 31.0 pg Final    MCHC 08/07/2025 32.7  32.0 - 36.0 g/dL Final    RDW 08/07/2025 13.2  11.5 - 14.5 % Final    Platelet Count 08/07/2025 201  150 -  450 K/uL Final    MPV 08/07/2025 12.1  9.2 - 12.9 fL Final    Nucleated RBC 08/07/2025 0  <=0 /100 WBC Final    Neut % 08/07/2025 49.1  38 - 73 % Final    Lymph % 08/07/2025 40.6  18 - 48 % Final    Mono % 08/07/2025 7.1  4 - 15 % Final    Eos % 08/07/2025 2.2  <=8 % Final    Basophil % 08/07/2025 0.6  <=1.9 % Final    Imm Grans % 08/07/2025 0.4  0.0 - 0.5 % Final    Neut # 08/07/2025 2.62  1.8 - 7.7 K/uL Final    Lymph # 08/07/2025 2.17  1 - 4.8 K/uL Final    Mono # 08/07/2025 0.38  0.3 - 1 K/uL Final    Eos # 08/07/2025 0.12  <=0.5 K/uL Final    Baso # 08/07/2025 0.03  <=0.2 K/uL Final    Imm Grans # 08/07/2025 0.02  0.00 - 0.04 K/uL Final         Medications Ordered Prior to Encounter[1]  Past Medical History:   Diagnosis Date    Asthma     Last ER visit approx 2010. usually after illness    Benign essential HTN 06/19/2017    CVID (common variable immunodeficiency)     Neck strain 05/17/2016    Thyroid nodule 06/28/2017     Past Surgical History:   Procedure Laterality Date    CATARACT EXTRACTION W/  INTRAOCULAR LENS IMPLANT Left 10/25/2023    Procedure: CEIOL OS;  Surgeon: Suhail Espinal MD;  Location: [facility] OR;  Service: Ophthalmology;  Laterality: Left;    CATARACT EXTRACTION W/  INTRAOCULAR LENS IMPLANT Right 11/15/2023    Procedure: CEIOL OD;  Surgeon: Suhail Espinal MD;  Location: [facility] OR;  Service: Ophthalmology;  Laterality: Right;    cervial cone surgery      CLOSURE OF VESICOVAGINAL FISTULA N/A 06/06/2019    Procedure: excision of vaginal lesion vs urethral diverticulectomy;  Surgeon: Sondra Cobb MD;  Location: [facility] OR;  Service: Urology;  Laterality: N/A;   to assist, first case, please put  as co-surgeon    COLONOSCOPY N/A 5/29/2023    Procedure: COLONOSCOPY;  Surgeon: Yimi Gan MD;  Location: [facility];  Service: Endoscopy;  Laterality: N/A;    CYSTOSCOPY N/A 04/22/2019    Procedure: CYSTOSCOPY;  Surgeon: Sondra Cobb MD;  Location: [facility]  OR;  Service: Urology;  Laterality: N/A;    HYSTERECTOMY      KNEE SURGERY      nose surgery      VAGINOSCOPY N/A 04/22/2019    Procedure: VAGINOSCOPY;  Surgeon: Sondra Cobb MD;  Location: [facility] OR;  Service: Urology;  Laterality: N/A;     Past Surgical History:   Procedure Laterality Date    CATARACT EXTRACTION W/  INTRAOCULAR LENS IMPLANT Left 10/25/2023    Procedure: CEIOL OS;  Surgeon: Suhail Espinal MD;  Location: [facility] OR;  Service: Ophthalmology;  Laterality: Left;    CATARACT EXTRACTION W/  INTRAOCULAR LENS IMPLANT Right 11/15/2023    Procedure: CEIOL OD;  Surgeon: Suhail Espinal MD;  Location: [facility] OR;  Service: Ophthalmology;  Laterality: Right;    cervial cone surgery      CLOSURE OF VESICOVAGINAL FISTULA N/A 06/06/2019    Procedure: excision of vaginal lesion vs urethral diverticulectomy;  Surgeon: Sondra Cobb MD;  Location: [facility] OR;  Service: Urology;  Laterality: N/A;   to assist, first case, please put  as co-surgeon    COLONOSCOPY N/A 5/29/2023    Procedure: COLONOSCOPY;  Surgeon: Yimi Gan MD;  Location: [facility];  Service: Endoscopy;  Laterality: N/A;    CYSTOSCOPY N/A 04/22/2019    Procedure: CYSTOSCOPY;  Surgeon: Sondra Cobb MD;  Location: [facility] OR;  Service: Urology;  Laterality: N/A;    HYSTERECTOMY      KNEE SURGERY      nose surgery      VAGINOSCOPY N/A 04/22/2019    Procedure: VAGINOSCOPY;  Surgeon: Sondra Cobb MD;  Location: [facility] OR;  Service: Urology;  Laterality: N/A;     Family History   Problem Relation Name Age of Onset    Diabetes Mother      Hypertension Mother      Heart disease Mother      Rheum arthritis Mother      Hyperlipidemia Mother      Heart disease Father      Hypertension Father      Hyperlipidemia Father      Bladder Cancer Father      Cancer Paternal Aunt      Cancer Paternal Grandmother      Glaucoma Neg Hx      Macular degeneration Neg Hx         Marital Status:   Alcohol  History:  reports no history of alcohol use.  Tobacco History:  reports that she has never smoked. She has never been exposed to tobacco smoke. She has never used smokeless tobacco.  Drug History:  reports no history of drug use.    Health Maintenance Topics with due status: Not Due       Topic Last Completion Date    TETANUS VACCINE 10/06/2015    Pneumococcal Vaccines (Age 50+) 09/28/2020    Colorectal Cancer Screening 05/29/2023    Influenza Vaccine 10/14/2024    Mammogram 02/12/2025    Hemoglobin A1c (Diabetic Prevention Screening) 08/07/2025    Lipid Panel 08/07/2025    RSV Vaccine (Age 60+ and Pregnant patients) Not Due     Immunization History   Administered Date(s) Administered    COVID-19, MRNA, LN-S, PF (MODERNA FULL 0.5 ML DOSE) 03/23/2021, 04/27/2021    Influenza 11/01/2002, 10/15/2008, 10/01/2014, 10/06/2015, 09/22/2016    Influenza - Quadrivalent 09/19/2017    Influenza - Quadrivalent - PF *Preferred* (6 months and older) 09/18/2018, 10/18/2019, 10/08/2020, 09/30/2021, 11/02/2022    Influenza - Trivalent - Afluria, Fluzone MDV 10/11/2012, 09/27/2016    Influenza - Trivalent - Fluarix, Flulaval, Fluzone, Afluria - PF 10/15/2008    Influenza Split 10/05/2011, 10/10/2012    Pneumococcal Conjugate - 13 Valent 01/01/2010    Pneumococcal Polysaccharide - 23 Valent 10/05/2011, 09/28/2020    Tdap 07/31/2015    Zoster Recombinant 11/01/2021, 05/11/2022       Review of patient's allergies indicates:   Allergen Reactions    Sulfamethoxazole-trimethoprim Rash and Hives     Current Medications[2]    Review of Systems   Constitutional:  Negative for chills and fever.   Respiratory:  Negative for shortness of breath.    Cardiovascular:  Negative for chest pain.   Gastrointestinal:  Negative for abdominal pain, constipation, nausea and vomiting.   Genitourinary:  Negative for hematuria.       OBJECTIVE:      Vitals:    08/07/25 1422   BP: 118/70   BP Location: Right arm   Patient Position: Sitting   Pulse: 75   SpO2:  "97%   Weight: 90.4 kg (199 lb 4.7 oz)   Height: 5' 4" (1.626 m)     Physical Exam  Constitutional:       General: She is not in acute distress.     Appearance: She is obese. She is not ill-appearing or toxic-appearing.   HENT:      Head: Normocephalic and atraumatic.      Mouth/Throat:      Mouth: Mucous membranes are moist.      Pharynx: Uvula midline. No pharyngeal swelling.   Eyes:      Extraocular Movements: Extraocular movements intact.      Pupils: Pupils are equal, round, and reactive to light.   Cardiovascular:      Rate and Rhythm: Normal rate and regular rhythm.   Pulmonary:      Effort: Pulmonary effort is normal. No tachypnea, bradypnea, accessory muscle usage, prolonged expiration or respiratory distress.      Breath sounds: Normal breath sounds. No stridor. No wheezing, rhonchi or rales.   Abdominal:      General: Bowel sounds are normal. There is no distension.      Palpations: Abdomen is soft.      Tenderness: There is no abdominal tenderness. There is no guarding or rebound.   Neurological:      General: No focal deficit present.      Mental Status: She is alert.   Psychiatric:         Mood and Affect: Mood normal.         Behavior: Behavior normal.        Assessment:       1. Routine general medical examination at a health care facility    2. Severe persistent asthma without complication    3. CVID (common variable immunodeficiency)    4. Chronic allergic rhinitis    5. Benign essential HTN    6. Encounter for lipid screening for cardiovascular disease    7. Screening for diabetes mellitus (DM)    8. Screening for thyroid disorder    9. Breast cyst, right        Plan:       Routine general medical examination at a health care facility  -     CBC Auto Differential; Future; Expected date: 08/07/2025  -     Comprehensive Metabolic Panel; Future; Expected date: 08/07/2025  - Ordered CBC, CMP, lipid panel, thyroid level, and urinalysis to monitor various health parameters, including diabetes, thyroid " function, cholesterol levels, blood count, hepatic and renal function, and electrolytes.    Severe persistent asthma without complication  - Monitored patient's random bouts of cough and shortness of breath, especially triggered by heat.  - Evaluated increased dyspnea potentially due to heat exposure.  - Reviewed current medications, including recently initiated Tezspire and confirmed continued use of inhalers, including Symbicort (inhaled steroids) for asthma management.    CVID (common variable immunodeficiency)  - Evaluated the patient's diagnosed common variable immunodeficiency (CVID).  - Reviewed current treatment regimen, including monthly Gammagard infusions and Tezspire injections for management.    Chronic allergic rhinitis  - Stable  - Continue to monitor     Benign essential HTN  -     CBC Auto Differential; Future; Expected date: 08/07/2025  -     Comprehensive Metabolic Panel; Future; Expected date: 08/07/2025  -     Microalbumin/Creatinine Ratio, Urine; Future; Expected date: 08/07/2025  - Prescribed Olmesartan 5 mg for blood pressure management.  - Planned to check for proteinuria due to history of hypertension.    Encounter for lipid screening for cardiovascular disease  -     Lipid Panel; Future; Expected date: 08/07/2025    Screening for diabetes mellitus (DM)  -     Hemoglobin A1C; Future; Expected date: 08/07/2025    Screening for thyroid disorder  -     TSH; Future; Expected date: 08/07/2025    Breast cyst, right  -     US Breast Right Limited; Future; Expected date: 08/07/2025  - Assessed the need for breast ultrasound follow-up based on previous findings of a cyst on the right breast.  - Noted the patient is due for follow up in August.    FOLLOW-UP:  - Follow up in 1 year, unless lab work results indicate need for earlier appointment.  - Patient advised to contact the office if any concerning symptoms arise before next scheduled appointment.         Zahra Scott M.D.  8/9/2025    This note  was created using [employer] voice recognition software that occasionally misinterprets phrases or words            [1]   Current Outpatient Medications on File Prior to Visit   Medication Sig Dispense Refill    albuterol (ACCUNEB) 1.25 mg/3 mL Nebu Inhale 6 mL 3 times a day by inhalation route.      albuterol sulfate (INV ALBUTEROL) 90 mcg inhalation Inhale into the lungs as needed. Take one puff by mouth as directed by Physician. Investigational Medication. Patient Study#    .      ascorbic acid, vitamin C, (VITAMIN C) 500 MG tablet Take 500 mg by mouth 3 (three) times daily.      budesonide-formoterol 160-4.5 mcg (SYMBICORT) 160-4.5 mcg/actuation HFAA Inhale 2 puffs into the lungs every 12 (twelve) hours. Controller 6 g 3    fluticasone propionate (FLONASE) 50 mcg/actuation nasal spray 2 sprays (100 mcg total) by Each Nostril route 2 (two) times daily. 9.9 g 3    immun glob G,IgG,-gly-IgA ov50 (GAMMAGARD LIQUID) 10 % injection Inject 1 g/kg into the vein every 28 days. Not sure of the dose      levocetirizine (XYZAL) 5 MG tablet Take 5 mg by mouth every evening.      montelukast (SINGULAIR) 10 mg tablet Take 10 mg by mouth once daily.      nystatin (MYCOSTATIN) powder Apply topically 2 (two) times daily. 15 g 0    olmesartan (BENICAR) 5 MG Tab Take 1 tablet (5 mg total) by mouth once daily. 90 tablet 3    selenium 50 mcg Tab Take 200 mcg by mouth once daily.      tezepelumab-ekko (TEZSPIRE) 210 mg/1.91 mL (110 mg/mL) PnIj Inject 210 mg into the skin every 28 days.      tiotropium bromide (SPIRIVA RESPIMAT) 1.25 mcg/actuation inhaler Inhale 2 puffs into the lungs once daily. Controller 4 g 3    valACYclovir (VALTREX) 1000 MG tablet 2 tablets every 12 hours x 1 day 12 tablet 1     No current facility-administered medications on file prior to visit.   [2]   Current Outpatient Medications:     albuterol (ACCUNEB) 1.25 mg/3 mL Nebu, Inhale 6 mL 3 times a day by inhalation route., Disp: , Rfl:     albuterol sulfate (INV  ALBUTEROL) 90 mcg inhalation, Inhale into the lungs as needed. Take one puff by mouth as directed by Physician. Investigational Medication. Patient Study#    ., Disp: , Rfl:     ascorbic acid, vitamin C, (VITAMIN C) 500 MG tablet, Take 500 mg by mouth 3 (three) times daily., Disp: , Rfl:     budesonide-formoterol 160-4.5 mcg (SYMBICORT) 160-4.5 mcg/actuation HFAA, Inhale 2 puffs into the lungs every 12 (twelve) hours. Controller, Disp: 6 g, Rfl: 3    fluticasone propionate (FLONASE) 50 mcg/actuation nasal spray, 2 sprays (100 mcg total) by Each Nostril route 2 (two) times daily., Disp: 9.9 g, Rfl: 3    immun glob G,IgG,-gly-IgA ov50 (GAMMAGARD LIQUID) 10 % injection, Inject 1 g/kg into the vein every 28 days. Not sure of the dose, Disp: , Rfl:     levocetirizine (XYZAL) 5 MG tablet, Take 5 mg by mouth every evening., Disp: , Rfl:     montelukast (SINGULAIR) 10 mg tablet, Take 10 mg by mouth once daily., Disp: , Rfl:     nystatin (MYCOSTATIN) powder, Apply topically 2 (two) times daily., Disp: 15 g, Rfl: 0    olmesartan (BENICAR) 5 MG Tab, Take 1 tablet (5 mg total) by mouth once daily., Disp: 90 tablet, Rfl: 3    selenium 50 mcg Tab, Take 200 mcg by mouth once daily., Disp: , Rfl:     tezepelumab-ekko (TEZSPIRE) 210 mg/1.91 mL (110 mg/mL) PnIj, Inject 210 mg into the skin every 28 days., Disp: , Rfl:     tiotropium bromide (SPIRIVA RESPIMAT) 1.25 mcg/actuation inhaler, Inhale 2 puffs into the lungs once daily. Controller, Disp: 4 g, Rfl: 3    valACYclovir (VALTREX) 1000 MG tablet, 2 tablets every 12 hours x 1 day, Disp: 12 tablet, Rfl: 1

## 2025-08-12 DIAGNOSIS — R92.8 ABNORMAL MAMMOGRAM: Primary | ICD-10-CM

## 2025-08-26 ENCOUNTER — OFFICE VISIT (OUTPATIENT)
Dept: PODIATRY | Facility: CLINIC | Age: 55
End: 2025-08-26
Payer: COMMERCIAL

## 2025-08-26 VITALS — OXYGEN SATURATION: 99 % | HEIGHT: 64 IN | WEIGHT: 199.75 LBS | BODY MASS INDEX: 34.1 KG/M2

## 2025-08-26 DIAGNOSIS — D23.71 BENIGN NEOPLASM OF SKIN OF RIGHT LOWER EXTREMITY, INCLUDING HIP: Primary | ICD-10-CM

## 2025-08-26 DIAGNOSIS — M79.671 RIGHT FOOT PAIN: ICD-10-CM

## 2025-08-26 PROCEDURE — 3044F HG A1C LEVEL LT 7.0%: CPT | Mod: CPTII,S$GLB,, | Performed by: PODIATRIST

## 2025-08-26 PROCEDURE — 1160F RVW MEDS BY RX/DR IN RCRD: CPT | Mod: CPTII,S$GLB,, | Performed by: PODIATRIST

## 2025-08-26 PROCEDURE — 4010F ACE/ARB THERAPY RXD/TAKEN: CPT | Mod: CPTII,S$GLB,, | Performed by: PODIATRIST

## 2025-08-26 PROCEDURE — 3008F BODY MASS INDEX DOCD: CPT | Mod: CPTII,S$GLB,, | Performed by: PODIATRIST

## 2025-08-26 PROCEDURE — 3061F NEG MICROALBUMINURIA REV: CPT | Mod: CPTII,S$GLB,, | Performed by: PODIATRIST

## 2025-08-26 PROCEDURE — 1159F MED LIST DOCD IN RCRD: CPT | Mod: CPTII,S$GLB,, | Performed by: PODIATRIST

## 2025-08-26 PROCEDURE — 99999 PR PBB SHADOW E&M-EST. PATIENT-LVL III: CPT | Mod: PBBFAC,,, | Performed by: PODIATRIST

## 2025-08-26 PROCEDURE — 99214 OFFICE O/P EST MOD 30 MIN: CPT | Mod: S$GLB,,, | Performed by: PODIATRIST

## 2025-08-26 PROCEDURE — 3066F NEPHROPATHY DOC TX: CPT | Mod: CPTII,S$GLB,, | Performed by: PODIATRIST

## 2025-09-02 DIAGNOSIS — D23.71 BENIGN NEOPLASM OF SKIN OF RIGHT LOWER EXTREMITY, INCLUDING HIP: Primary | ICD-10-CM

## 2025-09-02 DIAGNOSIS — M79.671 RIGHT FOOT PAIN: ICD-10-CM

## (undated) DEVICE — TIP I & A

## (undated) DEVICE — DRAPE OPTHALMIC W/POUCH

## (undated) DEVICE — BLADE SURG BVL ANG COAX 2.4MM

## (undated) DEVICE — TRAY DRY SPONGE SCRUB W FOAM

## (undated) DEVICE — SCRUB 10% POVIDONE IODINE 4OZ

## (undated) DEVICE — SHIELD EYE PLASTIC 3100G

## (undated) DEVICE — Device

## (undated) DEVICE — SEE MEDLINE ITEM 157116

## (undated) DEVICE — SUT 3-0 VICRYL / SH (J416)

## (undated) DEVICE — GLOVE 6.0 PROTEXIS PI BLUE

## (undated) DEVICE — CYSTOTOME IRRIG 24G 13MM

## (undated) DEVICE — SOL NS 1000CC

## (undated) DEVICE — APPLICATOR CHLORAPREP ORN 26ML

## (undated) DEVICE — SEE MEDLINE ITEM 152622

## (undated) DEVICE — SOL PVP-I SCRUB 7.5% 4OZ

## (undated) DEVICE — SET IRR URLGY 2LINE UNIV SPIKE

## (undated) DEVICE — PACK CUSTOM EYE KIT

## (undated) DEVICE — ELECTRODE REM PLYHSV RETURN 9

## (undated) DEVICE — GLOVE SURG ULTRA TOUCH 7.5

## (undated) DEVICE — CLIPPER BLADE MOD 4406 (CAREF)

## (undated) DEVICE — GLOVE SENSICARE PI ALOE 7.5

## (undated) DEVICE — LUBRICANT SURGILUBE 2 OZ

## (undated) DEVICE — KNIFE ANGLE 1MM

## (undated) DEVICE — SYS LABEL CORRECT MED

## (undated) DEVICE — GLOVE SURG ULTRA TOUCH 6

## (undated) DEVICE — SHEET DRAPE MEDIUM

## (undated) DEVICE — GLOVE PROTEXIS PI CLASSIC 6.0

## (undated) DEVICE — WATER STERILE INJ 500ML BAG

## (undated) DEVICE — SEE MEDLINE ITEM 157131

## (undated) DEVICE — SEE MEDLINE ITEM 157181

## (undated) DEVICE — UNDERGLOVE BIOGEL PI SZ 6.5 LF

## (undated) DEVICE — SLEEVE SCD EXPRESS CALF MEDIUM

## (undated) DEVICE — STRAP OR TABLE 5IN X 72IN

## (undated) DEVICE — SEE ITEM #152308

## (undated) DEVICE — SOL BETADINE 5%

## (undated) DEVICE — LEGGINGS X LARGE 6IN CUFF

## (undated) DEVICE — PACK CUSTOM UNIV BASIN SLI

## (undated) DEVICE — SOL 9P NACL IRR PIC IL